# Patient Record
Sex: FEMALE | Race: WHITE | NOT HISPANIC OR LATINO
[De-identification: names, ages, dates, MRNs, and addresses within clinical notes are randomized per-mention and may not be internally consistent; named-entity substitution may affect disease eponyms.]

---

## 2018-06-22 PROBLEM — Z00.00 ENCOUNTER FOR PREVENTIVE HEALTH EXAMINATION: Status: ACTIVE | Noted: 2018-06-22

## 2018-07-26 ENCOUNTER — APPOINTMENT (OUTPATIENT)
Dept: OTOLARYNGOLOGY | Facility: CLINIC | Age: 43
End: 2018-07-26

## 2018-09-01 ENCOUNTER — OUTPATIENT (OUTPATIENT)
Dept: OUTPATIENT SERVICES | Facility: HOSPITAL | Age: 43
LOS: 1 days | End: 2018-09-01
Payer: MEDICAID

## 2018-09-01 PROCEDURE — G9001: CPT

## 2019-04-03 DIAGNOSIS — Z71.89 OTHER SPECIFIED COUNSELING: ICD-10-CM

## 2020-09-03 ENCOUNTER — EMERGENCY (EMERGENCY)
Facility: HOSPITAL | Age: 45
LOS: 1 days | Discharge: HOME | End: 2020-09-03
Attending: EMERGENCY MEDICINE
Payer: MEDICARE

## 2020-09-03 VITALS
WEIGHT: 195.11 LBS | HEART RATE: 90 BPM | RESPIRATION RATE: 20 BRPM | DIASTOLIC BLOOD PRESSURE: 65 MMHG | OXYGEN SATURATION: 96 % | TEMPERATURE: 98 F | SYSTOLIC BLOOD PRESSURE: 119 MMHG | HEIGHT: 67 IN

## 2020-09-03 LAB
ALBUMIN SERPL ELPH-MCNC: 4 G/DL — SIGNIFICANT CHANGE UP (ref 3.3–5)
ALP SERPL-CCNC: 83 U/L — SIGNIFICANT CHANGE UP (ref 40–120)
ALT FLD-CCNC: 8 U/L — LOW (ref 10–45)
ANION GAP SERPL CALC-SCNC: 14 MMOL/L — SIGNIFICANT CHANGE UP (ref 5–17)
AST SERPL-CCNC: 12 U/L — SIGNIFICANT CHANGE UP (ref 10–40)
BASE EXCESS BLDV CALC-SCNC: 0.2 MMOL/L — SIGNIFICANT CHANGE UP (ref -2–2)
BASOPHILS # BLD AUTO: 0.03 K/UL — SIGNIFICANT CHANGE UP (ref 0–0.2)
BASOPHILS NFR BLD AUTO: 0.2 % — SIGNIFICANT CHANGE UP (ref 0–2)
BILIRUB SERPL-MCNC: 0.2 MG/DL — SIGNIFICANT CHANGE UP (ref 0.2–1.2)
BUN SERPL-MCNC: 12 MG/DL — SIGNIFICANT CHANGE UP (ref 7–23)
CA-I SERPL-SCNC: 1.19 MMOL/L — SIGNIFICANT CHANGE UP (ref 1.12–1.3)
CALCIUM SERPL-MCNC: 9 MG/DL — SIGNIFICANT CHANGE UP (ref 8.4–10.5)
CHLORIDE BLDV-SCNC: 105 MMOL/L — SIGNIFICANT CHANGE UP (ref 96–108)
CHLORIDE SERPL-SCNC: 100 MMOL/L — SIGNIFICANT CHANGE UP (ref 96–108)
CO2 BLDV-SCNC: 27 MMOL/L — SIGNIFICANT CHANGE UP (ref 22–30)
CO2 SERPL-SCNC: 22 MMOL/L — SIGNIFICANT CHANGE UP (ref 22–31)
CREAT SERPL-MCNC: 0.66 MG/DL — SIGNIFICANT CHANGE UP (ref 0.5–1.3)
EOSINOPHIL # BLD AUTO: 0.07 K/UL — SIGNIFICANT CHANGE UP (ref 0–0.5)
EOSINOPHIL NFR BLD AUTO: 0.6 % — SIGNIFICANT CHANGE UP (ref 0–6)
GAS PNL BLDV: 137 MMOL/L — SIGNIFICANT CHANGE UP (ref 135–145)
GAS PNL BLDV: SIGNIFICANT CHANGE UP
GLUCOSE BLDV-MCNC: 159 MG/DL — HIGH (ref 70–99)
GLUCOSE SERPL-MCNC: 169 MG/DL — HIGH (ref 70–99)
HCO3 BLDV-SCNC: 26 MMOL/L — SIGNIFICANT CHANGE UP (ref 21–29)
HCT VFR BLD CALC: 37.4 % — SIGNIFICANT CHANGE UP (ref 34.5–45)
HCT VFR BLDA CALC: 37 % — LOW (ref 39–50)
HGB BLD CALC-MCNC: 12 G/DL — SIGNIFICANT CHANGE UP (ref 11.5–15.5)
HGB BLD-MCNC: 11.8 G/DL — SIGNIFICANT CHANGE UP (ref 11.5–15.5)
IMM GRANULOCYTES NFR BLD AUTO: 0.6 % — SIGNIFICANT CHANGE UP (ref 0–1.5)
LACTATE BLDV-MCNC: 1.7 MMOL/L — SIGNIFICANT CHANGE UP (ref 0.7–2)
LIDOCAIN IGE QN: 19 U/L — SIGNIFICANT CHANGE UP (ref 7–60)
LYMPHOCYTES # BLD AUTO: 1.27 K/UL — SIGNIFICANT CHANGE UP (ref 1–3.3)
LYMPHOCYTES # BLD AUTO: 10.4 % — LOW (ref 13–44)
MCHC RBC-ENTMCNC: 28.8 PG — SIGNIFICANT CHANGE UP (ref 27–34)
MCHC RBC-ENTMCNC: 31.6 GM/DL — LOW (ref 32–36)
MCV RBC AUTO: 91.2 FL — SIGNIFICANT CHANGE UP (ref 80–100)
MONOCYTES # BLD AUTO: 0.66 K/UL — SIGNIFICANT CHANGE UP (ref 0–0.9)
MONOCYTES NFR BLD AUTO: 5.4 % — SIGNIFICANT CHANGE UP (ref 2–14)
NEUTROPHILS # BLD AUTO: 10.13 K/UL — HIGH (ref 1.8–7.4)
NEUTROPHILS NFR BLD AUTO: 82.8 % — HIGH (ref 43–77)
NRBC # BLD: 0 /100 WBCS — SIGNIFICANT CHANGE UP (ref 0–0)
PCO2 BLDV: 48 MMHG — SIGNIFICANT CHANGE UP (ref 35–50)
PH BLDV: 7.35 — SIGNIFICANT CHANGE UP (ref 7.35–7.45)
PLATELET # BLD AUTO: 351 K/UL — SIGNIFICANT CHANGE UP (ref 150–400)
PO2 BLDV: 33 MMHG — SIGNIFICANT CHANGE UP (ref 25–45)
POTASSIUM BLDV-SCNC: 3.2 MMOL/L — LOW (ref 3.5–5.3)
POTASSIUM SERPL-MCNC: 3.4 MMOL/L — LOW (ref 3.5–5.3)
POTASSIUM SERPL-SCNC: 3.4 MMOL/L — LOW (ref 3.5–5.3)
PROT SERPL-MCNC: 6.9 G/DL — SIGNIFICANT CHANGE UP (ref 6–8.3)
RBC # BLD: 4.1 M/UL — SIGNIFICANT CHANGE UP (ref 3.8–5.2)
RBC # FLD: 13 % — SIGNIFICANT CHANGE UP (ref 10.3–14.5)
SAO2 % BLDV: 58 % — LOW (ref 67–88)
SODIUM SERPL-SCNC: 136 MMOL/L — SIGNIFICANT CHANGE UP (ref 135–145)
WBC # BLD: 12.23 K/UL — HIGH (ref 3.8–10.5)
WBC # FLD AUTO: 12.23 K/UL — HIGH (ref 3.8–10.5)

## 2020-09-03 PROCEDURE — 93010 ELECTROCARDIOGRAM REPORT: CPT

## 2020-09-03 PROCEDURE — 71045 X-RAY EXAM CHEST 1 VIEW: CPT | Mod: 26

## 2020-09-03 PROCEDURE — 99285 EMERGENCY DEPT VISIT HI MDM: CPT

## 2020-09-03 RX ORDER — MORPHINE SULFATE 50 MG/1
4 CAPSULE, EXTENDED RELEASE ORAL ONCE
Refills: 0 | Status: DISCONTINUED | OUTPATIENT
Start: 2020-09-03 | End: 2020-09-03

## 2020-09-03 RX ADMIN — MORPHINE SULFATE 4 MILLIGRAM(S): 50 CAPSULE, EXTENDED RELEASE ORAL at 23:13

## 2020-09-03 NOTE — ED PROVIDER NOTE - ATTENDING CONTRIBUTION TO CARE
attending Tena: 45yF h/o paranoid Schizophrenia (on Risperdal) p/w diffuse abdominal pain and distension that began 1 hour prior to arrival. No prior abdominal surgeries. No associated symptoms. Abdomen mildly distended on exam, tender diffusely without r/g/r. Will obtain labs, CT A/P, pain control, reassess

## 2020-09-03 NOTE — ED PROVIDER NOTE - OBJECTIVE STATEMENT
45F PMH Paranoid Schizophrenia (on risperdal) presents to the ED with diffuse abdominal pain and distension that began 1 hour prior to arrival. Denies any abd surgeries, denies nausea/v/d, dark/bloody stools. Last normal bowel movement was 2 hours before she had the pain but states she's not passing gas. Denies fevers, chills. States she's had this pain 3 months ago but never got worked up for it. Didn't take anything for the pain. 45F PMH Paranoid Schizophrenia (on risperdal) presents to the ED with diffuse abdominal pain and distension that began 1 hour prior to arrival. Denies any abd surgeries, denies nausea/v/d, dark/bloody stools. Last normal bowel movement was 2 hours before she had the pain but states she's not passing gas. Reports fevers and chills right when she got the pain but they went away. States she's had this pain 3 months ago but never got worked up for it. Didn't take anything for the pain.

## 2020-09-03 NOTE — ED PROVIDER NOTE - CLINICAL SUMMARY MEDICAL DECISION MAKING FREE TEXT BOX
Concern for bowel obstruction despite no surg hx. Will give pain meds, labs, ekg, ct. Reassess. Concern for bowel obstruction despite no surg hx. Pt still in bed unclear if due to abd pain possible perf vs her primary psych, low suspicion for perf though, will order upright chest xray. Will give pain meds, labs, ekg, ct. Reassess.

## 2020-09-03 NOTE — ED ADULT NURSE NOTE - OBJECTIVE STATEMENT
44y/o female presents to the ED via EMS from home c/o Abdominal pain that started around 2130 (10/10 generalized sharp pain), pt denies N/V/D. Pt is AOx4, patent airway, clear lung sounds, abdomen is tender upon palpation diffusely with distention, strong peripheral pulses, no changes in bowel/bladder. Patient denies fever, chills, n/v, weakness, diarrhea/constipation, numbness/tingling, urinary s/s, in no respiratory distress, no chest pain, Patient safety provided with call bell within reach and bed in the lowest position.

## 2020-09-03 NOTE — ED PROVIDER NOTE - PHYSICAL EXAMINATION
GENERAL: no acute distress, non-toxic appearing  HEENT: normal nonicteric conjunctiva, oral mucosa moist  CARDIAC: regular rate and regular rhythm, normal S1 and S2, no appreciable murmurs  PULM: clear to ascultation bilaterally, no appreciable crackles, rales, rhonchi, or wheezing, sats 100% on RA, no increased work of breathing  GI: abdomen distended and diffusely tender, no rebound/guarding  : + suprapubic tenderness  NEURO: alert and oriented x 3, normal speech, moving all extremities without lateralization  MSK: no visible deformities, no peripheral edema, calf tenderness/redness/swelling  SKIN: no visible rashes  PSYCH: anxious/paranoid

## 2020-09-03 NOTE — ED PROVIDER NOTE - PATIENT PORTAL LINK FT
You can access the FollowMyHealth Patient Portal offered by [facility] by registering at the following website: [email or web address]. By joining [employer]’s FollowMyHealth portal, you will also be able to view your health information using other applications (apps) compatible with our system.

## 2020-09-03 NOTE — ED PROVIDER NOTE - PROGRESS NOTE DETAILS
Rickey, PGY2: explained to pt her CT results she's aware, she does report some difficulty urinating for the past 3 days. Will get TVUS. Paged GYN. Rickey, PGY2: explained to pt her CT results she's aware, she does report some difficulty urinating for the past 3 days. Will get TVUS. Paged GYN.  Tried reaching pts brother Jevon to explain results with no answer Rickey, PGY2: pt having difficulty urinating here, she states that she was able to urinate prior to arrival but having a bit more difficulty past few days. when put on bedpan pt denied the difficulty to urinate being from the nurses in the room. unclear if pt primary psych is the cause. straight cath with 400cc urine so likely able to urinate, no bump in cr or hydro seen on CT. Rickey, PGY2: pt able to urinate, discussed the need to follow up with her gyn. sent pain meds to pharmacy

## 2020-09-03 NOTE — ED PROVIDER NOTE - NSFOLLOWUPINSTRUCTIONS_ED_ALL_ED_FT
- Please follow up with your Primary Care Doctor within 48 hours. Bring your results from today.    - Please follow up with your Gynecologist Dr. Ocasio within 48 hours. Bring your results from today. We caryl blood markers for the Ovarian mass that didn't result before you were discharged. You may call [facility] for these results or visit your [facility] Electronic Portal.     - Tylenol up to 650 mg every 6 hours as needed for pain.    - Please take prescribed medication for pain:     - Oxycodone 5mg every 8 hours as needed for pain.    - Be sure to return to the ED if you develop new, worsening, or any distressing symptoms.
Yes

## 2020-09-03 NOTE — ED ADULT TRIAGE NOTE - CHIEF COMPLAINT QUOTE
b/l upper quadrant abdominal pain beginning one hour ago s/p lifting heavy bags. Patient denies n/v/d, f/c at this time.

## 2020-09-04 VITALS
HEART RATE: 111 BPM | RESPIRATION RATE: 19 BRPM | SYSTOLIC BLOOD PRESSURE: 131 MMHG | TEMPERATURE: 98 F | OXYGEN SATURATION: 95 % | DIASTOLIC BLOOD PRESSURE: 87 MMHG

## 2020-09-04 LAB
APPEARANCE UR: CLEAR — SIGNIFICANT CHANGE UP
BACTERIA # UR AUTO: NEGATIVE — SIGNIFICANT CHANGE UP
BILIRUB UR-MCNC: NEGATIVE — SIGNIFICANT CHANGE UP
CANCER AG125 SERPL-ACNC: 211 U/ML — HIGH
CANCER AG19-9 SERPL-ACNC: 434 U/ML — HIGH
CEA SERPL-MCNC: <0.6 NG/ML — SIGNIFICANT CHANGE UP (ref 0–3.8)
COLOR SPEC: YELLOW — SIGNIFICANT CHANGE UP
DIFF PNL FLD: NEGATIVE — SIGNIFICANT CHANGE UP
EPI CELLS # UR: 1 /HPF — SIGNIFICANT CHANGE UP
GLUCOSE UR QL: NEGATIVE — SIGNIFICANT CHANGE UP
HYALINE CASTS # UR AUTO: 0 /LPF — SIGNIFICANT CHANGE UP (ref 0–7)
KETONES UR-MCNC: ABNORMAL
LEUKOCYTE ESTERASE UR-ACNC: NEGATIVE — SIGNIFICANT CHANGE UP
NITRITE UR-MCNC: NEGATIVE — SIGNIFICANT CHANGE UP
PH UR: 6.5 — SIGNIFICANT CHANGE UP (ref 5–8)
PROT UR-MCNC: ABNORMAL
RBC CASTS # UR COMP ASSIST: 2 /HPF — SIGNIFICANT CHANGE UP (ref 0–4)
SP GR SPEC: >1.05 (ref 1.01–1.02)
UROBILINOGEN FLD QL: NEGATIVE — SIGNIFICANT CHANGE UP
WBC UR QL: 1 /HPF — SIGNIFICANT CHANGE UP (ref 0–5)

## 2020-09-04 PROCEDURE — 82330 ASSAY OF CALCIUM: CPT

## 2020-09-04 PROCEDURE — 85018 HEMOGLOBIN: CPT

## 2020-09-04 PROCEDURE — 99283 EMERGENCY DEPT VISIT LOW MDM: CPT

## 2020-09-04 PROCEDURE — 71045 X-RAY EXAM CHEST 1 VIEW: CPT

## 2020-09-04 PROCEDURE — 84295 ASSAY OF SERUM SODIUM: CPT

## 2020-09-04 PROCEDURE — 76856 US EXAM PELVIC COMPLETE: CPT

## 2020-09-04 PROCEDURE — 82435 ASSAY OF BLOOD CHLORIDE: CPT

## 2020-09-04 PROCEDURE — 86304 IMMUNOASSAY TUMOR CA 125: CPT

## 2020-09-04 PROCEDURE — 82947 ASSAY GLUCOSE BLOOD QUANT: CPT

## 2020-09-04 PROCEDURE — 99284 EMERGENCY DEPT VISIT MOD MDM: CPT | Mod: 25

## 2020-09-04 PROCEDURE — 93005 ELECTROCARDIOGRAM TRACING: CPT

## 2020-09-04 PROCEDURE — 84702 CHORIONIC GONADOTROPIN TEST: CPT

## 2020-09-04 PROCEDURE — 86301 IMMUNOASSAY TUMOR CA 19-9: CPT

## 2020-09-04 PROCEDURE — 87086 URINE CULTURE/COLONY COUNT: CPT

## 2020-09-04 PROCEDURE — 85014 HEMATOCRIT: CPT

## 2020-09-04 PROCEDURE — 74177 CT ABD & PELVIS W/CONTRAST: CPT | Mod: 26

## 2020-09-04 PROCEDURE — 76856 US EXAM PELVIC COMPLETE: CPT | Mod: 26

## 2020-09-04 PROCEDURE — 82803 BLOOD GASES ANY COMBINATION: CPT

## 2020-09-04 PROCEDURE — 82378 CARCINOEMBRYONIC ANTIGEN: CPT

## 2020-09-04 PROCEDURE — 74177 CT ABD & PELVIS W/CONTRAST: CPT

## 2020-09-04 PROCEDURE — 80053 COMPREHEN METABOLIC PANEL: CPT

## 2020-09-04 PROCEDURE — 84132 ASSAY OF SERUM POTASSIUM: CPT

## 2020-09-04 PROCEDURE — 81001 URINALYSIS AUTO W/SCOPE: CPT

## 2020-09-04 PROCEDURE — 85027 COMPLETE CBC AUTOMATED: CPT

## 2020-09-04 PROCEDURE — 83605 ASSAY OF LACTIC ACID: CPT

## 2020-09-04 PROCEDURE — 96374 THER/PROPH/DIAG INJ IV PUSH: CPT | Mod: XU

## 2020-09-04 PROCEDURE — 83690 ASSAY OF LIPASE: CPT

## 2020-09-04 RX ORDER — OXYCODONE HYDROCHLORIDE 5 MG/1
5 TABLET ORAL ONCE
Refills: 0 | Status: DISCONTINUED | OUTPATIENT
Start: 2020-09-04 | End: 2020-09-04

## 2020-09-04 RX ORDER — OXYCODONE HYDROCHLORIDE 5 MG/1
1 TABLET ORAL
Qty: 9 | Refills: 0
Start: 2020-09-04 | End: 2020-09-06

## 2020-09-04 RX ORDER — ACETAMINOPHEN 500 MG
1000 TABLET ORAL ONCE
Refills: 0 | Status: COMPLETED | OUTPATIENT
Start: 2020-09-04 | End: 2020-09-04

## 2020-09-04 RX ORDER — ACETAMINOPHEN 500 MG
1000 TABLET ORAL ONCE
Refills: 0 | Status: DISCONTINUED | OUTPATIENT
Start: 2020-09-04 | End: 2020-09-04

## 2020-09-04 RX ORDER — SODIUM CHLORIDE 9 MG/ML
1000 INJECTION INTRAMUSCULAR; INTRAVENOUS; SUBCUTANEOUS ONCE
Refills: 0 | Status: COMPLETED | OUTPATIENT
Start: 2020-09-04 | End: 2020-09-04

## 2020-09-04 RX ADMIN — SODIUM CHLORIDE 1000 MILLILITER(S): 9 INJECTION INTRAMUSCULAR; INTRAVENOUS; SUBCUTANEOUS at 06:42

## 2020-09-04 RX ADMIN — Medication 1000 MILLIGRAM(S): at 06:51

## 2020-09-04 RX ADMIN — OXYCODONE HYDROCHLORIDE 5 MILLIGRAM(S): 5 TABLET ORAL at 06:54

## 2020-09-04 NOTE — CONSULT NOTE ADULT - ASSESSMENT
44yo with no significant OBGYN history presenting to the ED with c/o abdominal pain. Imaging notable for large complex multiseptated pelvic mass and suggested involvement of peritoneum versus omental caking; findings concerning for malignancy.   - No acute GYN intervention warranted at this time  - Rec: UA, UCx for c/o frequent small voids; imaging notable for absent left kidney, no hydronephrosis of R kidney. Cr: 0.66.  - Rec: can send off tumor markers (, CA 19-9, CEA) for use in outpatient workup with private GYN  - Ok with additional imaging in form of Pelvic ultrasound  - Defer management of pain to ED     d/w Dr. Tracie Aguilar R2  [id number]

## 2020-09-04 NOTE — ED ADULT NURSE REASSESSMENT NOTE - NS ED NURSE REASSESS COMMENT FT1
Pt reports being unable to urinate. Straight cath performed with 2 RN at bedside to confirm sterility. 400cc clear yellow urine drained. Urine sample sent.

## 2020-09-04 NOTE — CONSULT NOTE ADULT - ATTENDING COMMENTS
/Attending:    Consulted on this 44y/o P0 who presents to ED with complaints of severe abdominal pain that started appr. 1hr prior to presenting to ED.    Pt was discussed with me; chart reviewed; I agree with the above Resident's assessment and plan.    Pt says pain is dull/cramping and constant; denies any other acute complaints, but does admit to constipation with small caliber stools; +change in urinary habits with frequent, small amounts of urine.  Also admits to SOB on exertion and when laying flat.  LMP 2wks ago and are regular, lasting 5 days.  Pt was given Morphine for pain, with some relief, but pain has returned and is 10/10.    VSS, afebrile; H/H: 11.8/37.4    Pt declined exam.    CT Scan Abd/Pelvis: Large complex multiseptated pelvic mass appears to originate from left adnexa, concerning for ovarian malignancy. Additional right adnexal mass measuring 4.6 x 3.1 cm. Suggestion of fibroid uterus. GYN consultation and further evaluation with pelvic ultrasound as indicated.  Small volume ascites, which measures higher in attenuation than simple fluid. Mesenteric haziness adjacent to the pelvic mass with suggestion of omental caking or peritoneal implants.  Absent left kidney. Right kidney enhances homogeneously without hydronephrosis.  No bowel obstruction.    A/P  -44y/o P0 who presents with abdominal pain with large complex multiseptated pelvic mass on CT scan, most likely gyn malignancy.  -Pain and further management by ED  -No acute gyn intervention at this time; pt needs out-pt gyn follow-up for further investigation and referral to Gyn-Onc.  -Could also get additional imaging for further information about the mass.    Thank you for the consult; please call if any further questions.    Dr. Brownlee /Attending:    Consulted on this 44y/o P0 who presents to ED with complaints of severe abdominal pain that started appr. 1hr prior to presenting to ED.    Pt was discussed with me; chart reviewed; I agree with the above Resident's assessment and plan.    Pt says pain is dull/cramping and constant; denies any other acute complaints, but does admit to constipation with small caliber stools; +change in urinary habits with frequent, small amounts of urine.  Also admits to SOB on exertion and when laying flat.  LMP 2wks ago and are regular, lasting 5 days.  Pt was given Morphine for pain, with some relief, but pain has returned and is 10/10.    VSS, afebrile; H/H: 11.8/37.4    Pt declined exam.    CT Scan Abd/Pelvis: Large complex multiseptated pelvic mass appears to originate from left adnexa, concerning for ovarian malignancy. Additional right adnexal mass measuring 4.6 x 3.1 cm. Suggestion of fibroid uterus. GYN consultation and further evaluation with pelvic ultrasound as indicated.  Small volume ascites, which measures higher in attenuation than simple fluid. Mesenteric haziness adjacent to the pelvic mass with suggestion of omental caking or peritoneal implants.  Absent left kidney. Right kidney enhances homogeneously without hydronephrosis.  No bowel obstruction.    A/P  -44y/o P0 who presents with abdominal pain with large complex multiseptated pelvic mass on CT scan, most likely gyn malignancy.  -Findings were discussed with pt and family member and all questions addressed.  -Pain and further management by ED  -No acute gyn intervention at this time; pt needs out-pt gyn follow-up for further investigation and referral to Gyn-Onc.  -Could also get additional imaging for further information about the mass.    Thank you for the consult; please call if any further questions.    Dr. Brownlee

## 2020-09-04 NOTE — CONSULT NOTE ADULT - SUBJECTIVE AND OBJECTIVE BOX
R2 GYNECOLOGY CONSULT NOTE    46yo G0 LMP 2 weeks ago presenting to the ED with c/o "very bad abdominal pain like I've never had before." Pt reports that pain started 1hr prior to presentation to ED (approximately 7hr prior to this evaluation). Pt did not use analgesia at home. Pt characterizes pain as dull/cramping that is constant; it is worsened with movement. She received Morphine 4mg IVP that "took the edge off," however, she has 10/10 pain again now. + SOB with exertion or when laying flat. + change in bowel habits: constipation that is new and having smaller caliber stools. + change bladder habits: frequent small voids, denies dysuria. Denies CP, nausea/vomiting, fevers/chills, vaginal discharge or bleeding.      She reports previously having a similar episode of sudden abdominal pain in July; the pain started after lifting heavy items and resolved spontaneously. She reports monthly periods with 5 days of bleeding (2 heavy days).     OB/GYN HISTORY:   G0, virginal  Denies fibroids, ov cysts, abnl Pap smears, STIs  OBGYN = Ocasio ([facility])    PAST MEDICAL & SURGICAL HISTORY:  Paranoid Schizophrenia (Psych @ [place]), using Risperdal 1mg/day  Anxiety/Depression  Heart murmur (Cards @ [place])  No surgical history    Medications: Prozac 40mg, Risperdal 1mg  Allergies: NSAIDs (facial swelling)    FHx: skin cancer (mother, father); lung cancer (2x maternal aunt)    SOCIAL HISTORY: denies T/E/D    REVIEW OF SYSTEMS:  CONSTITUTIONAL: No weakness, fevers or chills  RESPIRATORY: No cough, wheezing, hemoptysis; No shortness of breath  CARDIOVASCULAR: No chest pain or palpitations  GASTROINTESTINAL: +abdominal pain. No nausea, vomiting, or hematemesis; No diarrhea or constipation. GENITOURINARY: No dysuria or hematuria  All other review of systems is negative unless indicated above.    Vital Signs Last 24 Hrs  T(C): 36.9 (04 Sep 2020 01:58), Max: 36.9 (04 Sep 2020 01:58)  T(F): 98.5 (04 Sep 2020 01:58), Max: 98.5 (04 Sep 2020 01:58)  HR: 85 (04 Sep 2020 01:58) (85 - 90)  BP: 128/72 (04 Sep 2020 01:58) (119/65 - 128/72)  RR: 18 (04 Sep 2020 01:58) (18 - 20)  SpO2: 98% (04 Sep 2020 01:58) (96% - 98%)    PHYSICAL EXAM:  Gen: flat affect, laying in bed, A&Ox3  Pulm: nonlabored breathing  Abd: pt declined examination    LABS:                        11.8   12.23 )-----------( 351      ( 03 Sep 2020 23:03 )             37.4     09-03    136  |  100  |  12  ----------------------------<  169<H>  3.4<L>   |  22  |  0.66    Ca    9.0      03 Sep 2020 23:03    TPro  6.9  /  Alb  4.0  /  TBili  0.2  /  DBili  x   /  AST  12  /  ALT  8<L>  /  AlkPhos  83  09-03    RADIOLOGY & ADDITIONAL STUDIES:  < from: CT Abdomen and Pelvis w/ IV Cont (09.04.20 @ 01:00) >    EXAM:  CT ABDOMEN AND PELVIS IC                          PROCEDURE DATE:  09/04/2020    INTERPRETATION:  CLINICAL INFORMATION: Abdominal distention    COMPARISON: None.    PROCEDURE:  CT of the Abdomen and Pelvis was performed with intravenous contrast.  Intravenous contrast: 90 ml Omnipaque 350. 10 ml discarded.  Oral contrast: None.  Sagittal and coronal reformats were performed.    FINDINGS:  LOWER CHEST: Mild bibasilar subsegmental atelectasis. No consolidation or pleural effusion.    LIVER: Within normal limits.  BILE DUCTS: Normal caliber.  GALLBLADDER: Within normal limits.  SPLEEN: Within normal limits.  PANCREAS: Within normal limits.  ADRENALS: Within normal limits.  KIDNEYS/URETERS: Absent left kidney. Right kidney enhances homogeneously without hydronephrosis.    BLADDER: Inadequately distended.  REPRODUCTIVE ORGANS: Large complex multiseptated pelvic mass appears to originate from left adnexa, largest component of the mass measuring 12.3 x 11 x 9.2 cm (TR x AP x CC). Additional right adnexal mass measuring 4.6 x 3.1 cm. Suggestion of fibroid uterus.    BOWEL: No bowel obstruction. Appendix is not visualized. No evidence of inflammation in the pericecal region.  PERITONEUM: Small volume ascites, which measures higher in attenuation than simple fluid. Mesenteric haziness adjacent to the pelvic mass with suggestion of omental caking or peritoneal implants.  VESSELS: Within normal limits.  RETROPERITONEUM/LYMPH NODES: 1.6 cm periportal lymph node) 2:43).  ABDOMINAL WALL: Tiny fat-containing umbilical hernia.  BONES: Spinal degenerative changes.    IMPRESSION:    Large complex multiseptated pelvic mass appears to originate from left adnexa, concerning for ovarian malignancy. Additional right adnexal mass measuring 4.6 x 3.1 cm. Suggestion of fibroid uterus. GYN consultation and further evaluation with pelvic ultrasound as indicated.    Small volume ascites, which measures higher in attenuation than simple fluid. Mesenteric haziness adjacent to the pelvic mass with suggestion of omental caking or peritoneal implants.    No bowel obstruction.    These findings were discussed with Dr. Sousa at 9/4/2020 3:31 AM by Dr. Juarez.      DAYANARA JUAREZ M.D., RADIOLOGY RESIDENT  This document has been electronically signed.  SHANNON CRAIG M.D., ATTENDING RADIOLOGIST  This document has been electronically signed. Sep  4 2020  3:39AM          < end of copied text >

## 2020-09-06 LAB
CULTURE RESULTS: NO GROWTH — SIGNIFICANT CHANGE UP
SPECIMEN SOURCE: SIGNIFICANT CHANGE UP

## 2020-09-06 NOTE — ED POST DISCHARGE NOTE - DETAILS
9/6/20: discussed results with pt, pt has follow up with pcp this week and will obtain referral for gyn/onc, discussed that her pcp/gyn can call us to fax results if they need test results, pt notes understanding. -Denia Keating PA-C

## 2020-09-08 ENCOUNTER — INPATIENT (INPATIENT)
Facility: HOSPITAL | Age: 45
LOS: 0 days | Discharge: HOME | DRG: 754 | End: 2020-09-09
Attending: HOSPITALIST | Admitting: HOSPITALIST
Payer: MEDICARE

## 2020-09-08 VITALS
HEIGHT: 67 IN | WEIGHT: 195.11 LBS | TEMPERATURE: 98 F | SYSTOLIC BLOOD PRESSURE: 136 MMHG | HEART RATE: 138 BPM | DIASTOLIC BLOOD PRESSURE: 86 MMHG | OXYGEN SATURATION: 95 % | RESPIRATION RATE: 20 BRPM

## 2020-09-08 DIAGNOSIS — F20.0 PARANOID SCHIZOPHRENIA: ICD-10-CM

## 2020-09-08 DIAGNOSIS — R10.9 UNSPECIFIED ABDOMINAL PAIN: ICD-10-CM

## 2020-09-08 DIAGNOSIS — F41.9 ANXIETY DISORDER, UNSPECIFIED: ICD-10-CM

## 2020-09-08 DIAGNOSIS — Z29.9 ENCOUNTER FOR PROPHYLACTIC MEASURES, UNSPECIFIED: ICD-10-CM

## 2020-09-08 DIAGNOSIS — R19.00 INTRA-ABDOMINAL AND PELVIC SWELLING, MASS AND LUMP, UNSPECIFIED SITE: ICD-10-CM

## 2020-09-08 DIAGNOSIS — R65.10 SYSTEMIC INFLAMMATORY RESPONSE SYNDROME (SIRS) OF NON-INFECTIOUS ORIGIN WITHOUT ACUTE ORGAN DYSFUNCTION: ICD-10-CM

## 2020-09-08 DIAGNOSIS — R00.0 TACHYCARDIA, UNSPECIFIED: ICD-10-CM

## 2020-09-08 LAB
ALBUMIN SERPL ELPH-MCNC: 3.2 G/DL — LOW (ref 3.3–5)
ALP SERPL-CCNC: 98 U/L — SIGNIFICANT CHANGE UP (ref 40–120)
ALT FLD-CCNC: 11 U/L — SIGNIFICANT CHANGE UP (ref 10–45)
ANION GAP SERPL CALC-SCNC: 17 MMOL/L — SIGNIFICANT CHANGE UP (ref 5–17)
ANION GAP SERPL CALC-SCNC: 17 MMOL/L — SIGNIFICANT CHANGE UP (ref 5–17)
APPEARANCE UR: CLEAR — SIGNIFICANT CHANGE UP
APTT BLD: 27.8 SEC — SIGNIFICANT CHANGE UP (ref 27.5–35.5)
AST SERPL-CCNC: 15 U/L — SIGNIFICANT CHANGE UP (ref 10–40)
BACTERIA # UR AUTO: ABNORMAL
BASE EXCESS BLDV CALC-SCNC: -1.4 MMOL/L — SIGNIFICANT CHANGE UP (ref -2–2)
BASOPHILS # BLD AUTO: 0.03 K/UL — SIGNIFICANT CHANGE UP (ref 0–0.2)
BASOPHILS NFR BLD AUTO: 0.2 % — SIGNIFICANT CHANGE UP (ref 0–2)
BILIRUB SERPL-MCNC: 0.5 MG/DL — SIGNIFICANT CHANGE UP (ref 0.2–1.2)
BILIRUB UR-MCNC: NEGATIVE — SIGNIFICANT CHANGE UP
BUN SERPL-MCNC: 16 MG/DL — SIGNIFICANT CHANGE UP (ref 7–23)
BUN SERPL-MCNC: 20 MG/DL — SIGNIFICANT CHANGE UP (ref 7–23)
CA-I SERPL-SCNC: 1.15 MMOL/L — SIGNIFICANT CHANGE UP (ref 1.12–1.3)
CALCIUM SERPL-MCNC: 8.8 MG/DL — SIGNIFICANT CHANGE UP (ref 8.4–10.5)
CALCIUM SERPL-MCNC: 9.6 MG/DL — SIGNIFICANT CHANGE UP (ref 8.4–10.5)
CHLORIDE BLDV-SCNC: 105 MMOL/L — SIGNIFICANT CHANGE UP (ref 96–108)
CHLORIDE SERPL-SCNC: 101 MMOL/L — SIGNIFICANT CHANGE UP (ref 96–108)
CHLORIDE SERPL-SCNC: 98 MMOL/L — SIGNIFICANT CHANGE UP (ref 96–108)
CO2 BLDV-SCNC: 25 MMOL/L — SIGNIFICANT CHANGE UP (ref 22–30)
CO2 SERPL-SCNC: 19 MMOL/L — LOW (ref 22–31)
CO2 SERPL-SCNC: 21 MMOL/L — LOW (ref 22–31)
COLOR SPEC: YELLOW — SIGNIFICANT CHANGE UP
CREAT SERPL-MCNC: 0.58 MG/DL — SIGNIFICANT CHANGE UP (ref 0.5–1.3)
CREAT SERPL-MCNC: 0.67 MG/DL — SIGNIFICANT CHANGE UP (ref 0.5–1.3)
D DIMER BLD IA.RAPID-MCNC: 2793 NG/ML DDU — HIGH
DIFF PNL FLD: ABNORMAL
EOSINOPHIL # BLD AUTO: 0.08 K/UL — SIGNIFICANT CHANGE UP (ref 0–0.5)
EOSINOPHIL NFR BLD AUTO: 0.6 % — SIGNIFICANT CHANGE UP (ref 0–6)
EPI CELLS # UR: 3 /HPF — SIGNIFICANT CHANGE UP
GAS PNL BLDV: 134 MMOL/L — LOW (ref 135–145)
GAS PNL BLDV: SIGNIFICANT CHANGE UP
GAS PNL BLDV: SIGNIFICANT CHANGE UP
GLUCOSE BLDV-MCNC: 123 MG/DL — HIGH (ref 70–99)
GLUCOSE SERPL-MCNC: 118 MG/DL — HIGH (ref 70–99)
GLUCOSE SERPL-MCNC: 135 MG/DL — HIGH (ref 70–99)
GLUCOSE UR QL: NEGATIVE — SIGNIFICANT CHANGE UP
HCO3 BLDV-SCNC: 23 MMOL/L — SIGNIFICANT CHANGE UP (ref 21–29)
HCT VFR BLD CALC: 38.4 % — SIGNIFICANT CHANGE UP (ref 34.5–45)
HCT VFR BLD CALC: 40.1 % — SIGNIFICANT CHANGE UP (ref 34.5–45)
HCT VFR BLDA CALC: 35 % — LOW (ref 39–50)
HGB BLD CALC-MCNC: 11.5 G/DL — SIGNIFICANT CHANGE UP (ref 11.5–15.5)
HGB BLD-MCNC: 12 G/DL — SIGNIFICANT CHANGE UP (ref 11.5–15.5)
HGB BLD-MCNC: 12.7 G/DL — SIGNIFICANT CHANGE UP (ref 11.5–15.5)
HOROWITZ INDEX BLDV+IHG-RTO: SIGNIFICANT CHANGE UP
HYALINE CASTS # UR AUTO: 4 /LPF — HIGH (ref 0–2)
IMM GRANULOCYTES NFR BLD AUTO: 1.5 % — SIGNIFICANT CHANGE UP (ref 0–1.5)
INR BLD: 1.07 RATIO — SIGNIFICANT CHANGE UP (ref 0.88–1.16)
KETONES UR-MCNC: ABNORMAL
LACTATE BLDV-MCNC: 1.2 MMOL/L — SIGNIFICANT CHANGE UP (ref 0.7–2)
LEUKOCYTE ESTERASE UR-ACNC: NEGATIVE — SIGNIFICANT CHANGE UP
LYMPHOCYTES # BLD AUTO: 0.64 K/UL — LOW (ref 1–3.3)
LYMPHOCYTES # BLD AUTO: 4.7 % — LOW (ref 13–44)
MCHC RBC-ENTMCNC: 28.4 PG — SIGNIFICANT CHANGE UP (ref 27–34)
MCHC RBC-ENTMCNC: 28.7 PG — SIGNIFICANT CHANGE UP (ref 27–34)
MCHC RBC-ENTMCNC: 31.3 GM/DL — LOW (ref 32–36)
MCHC RBC-ENTMCNC: 31.7 GM/DL — LOW (ref 32–36)
MCV RBC AUTO: 90.5 FL — SIGNIFICANT CHANGE UP (ref 80–100)
MCV RBC AUTO: 90.8 FL — SIGNIFICANT CHANGE UP (ref 80–100)
MONOCYTES # BLD AUTO: 0.68 K/UL — SIGNIFICANT CHANGE UP (ref 0–0.9)
MONOCYTES NFR BLD AUTO: 5 % — SIGNIFICANT CHANGE UP (ref 2–14)
NEUTROPHILS # BLD AUTO: 12.05 K/UL — HIGH (ref 1.8–7.4)
NEUTROPHILS NFR BLD AUTO: 88 % — HIGH (ref 43–77)
NITRITE UR-MCNC: NEGATIVE — SIGNIFICANT CHANGE UP
NRBC # BLD: 0 /100 WBCS — SIGNIFICANT CHANGE UP (ref 0–0)
NRBC # BLD: 0 /100 WBCS — SIGNIFICANT CHANGE UP (ref 0–0)
PCO2 BLDV: 42 MMHG — SIGNIFICANT CHANGE UP (ref 35–50)
PH BLDV: 7.36 — SIGNIFICANT CHANGE UP (ref 7.35–7.45)
PH UR: 7 — SIGNIFICANT CHANGE UP (ref 5–8)
PLATELET # BLD AUTO: 413 K/UL — HIGH (ref 150–400)
PLATELET # BLD AUTO: 459 K/UL — HIGH (ref 150–400)
PO2 BLDV: 50 MMHG — HIGH (ref 25–45)
POTASSIUM BLDV-SCNC: 3.7 MMOL/L — SIGNIFICANT CHANGE UP (ref 3.5–5.3)
POTASSIUM SERPL-MCNC: 3.8 MMOL/L — SIGNIFICANT CHANGE UP (ref 3.5–5.3)
POTASSIUM SERPL-MCNC: 4.2 MMOL/L — SIGNIFICANT CHANGE UP (ref 3.5–5.3)
POTASSIUM SERPL-SCNC: 3.8 MMOL/L — SIGNIFICANT CHANGE UP (ref 3.5–5.3)
POTASSIUM SERPL-SCNC: 4.2 MMOL/L — SIGNIFICANT CHANGE UP (ref 3.5–5.3)
PROT SERPL-MCNC: 7.1 G/DL — SIGNIFICANT CHANGE UP (ref 6–8.3)
PROT UR-MCNC: 100 — SIGNIFICANT CHANGE UP
PROTHROM AB SERPL-ACNC: 12.7 SEC — SIGNIFICANT CHANGE UP (ref 10.6–13.6)
RBC # BLD: 4.23 M/UL — SIGNIFICANT CHANGE UP (ref 3.8–5.2)
RBC # BLD: 4.43 M/UL — SIGNIFICANT CHANGE UP (ref 3.8–5.2)
RBC # FLD: 13.6 % — SIGNIFICANT CHANGE UP (ref 10.3–14.5)
RBC # FLD: 14 % — SIGNIFICANT CHANGE UP (ref 10.3–14.5)
RBC CASTS # UR COMP ASSIST: 93 /HPF — HIGH (ref 0–4)
SAO2 % BLDV: 82 % — SIGNIFICANT CHANGE UP (ref 67–88)
SARS-COV-2 RNA SPEC QL NAA+PROBE: SIGNIFICANT CHANGE UP
SODIUM SERPL-SCNC: 136 MMOL/L — SIGNIFICANT CHANGE UP (ref 135–145)
SODIUM SERPL-SCNC: 137 MMOL/L — SIGNIFICANT CHANGE UP (ref 135–145)
SP GR SPEC: >1.05 (ref 1.01–1.02)
TROPONIN T, HIGH SENSITIVITY RESULT: 9 NG/L — SIGNIFICANT CHANGE UP (ref 0–51)
UROBILINOGEN FLD QL: NEGATIVE — SIGNIFICANT CHANGE UP
WBC # BLD: 12.75 K/UL — HIGH (ref 3.8–10.5)
WBC # BLD: 13.69 K/UL — HIGH (ref 3.8–10.5)
WBC # FLD AUTO: 12.75 K/UL — HIGH (ref 3.8–10.5)
WBC # FLD AUTO: 13.69 K/UL — HIGH (ref 3.8–10.5)
WBC UR QL: 5 /HPF — SIGNIFICANT CHANGE UP (ref 0–5)

## 2020-09-08 PROCEDURE — 71275 CT ANGIOGRAPHY CHEST: CPT | Mod: 26

## 2020-09-08 PROCEDURE — 74018 RADEX ABDOMEN 1 VIEW: CPT | Mod: 26

## 2020-09-08 PROCEDURE — 93010 ELECTROCARDIOGRAM REPORT: CPT

## 2020-09-08 PROCEDURE — 99223 1ST HOSP IP/OBS HIGH 75: CPT

## 2020-09-08 PROCEDURE — 99285 EMERGENCY DEPT VISIT HI MDM: CPT

## 2020-09-08 PROCEDURE — 76856 US EXAM PELVIC COMPLETE: CPT | Mod: 26

## 2020-09-08 PROCEDURE — 93970 EXTREMITY STUDY: CPT | Mod: 26

## 2020-09-08 RX ORDER — MORPHINE SULFATE 50 MG/1
4 CAPSULE, EXTENDED RELEASE ORAL EVERY 4 HOURS
Refills: 0 | Status: DISCONTINUED | OUTPATIENT
Start: 2020-09-08 | End: 2020-09-09

## 2020-09-08 RX ORDER — ONDANSETRON 8 MG/1
4 TABLET, FILM COATED ORAL ONCE
Refills: 0 | Status: COMPLETED | OUTPATIENT
Start: 2020-09-08 | End: 2020-09-08

## 2020-09-08 RX ORDER — SODIUM CHLORIDE 9 MG/ML
1000 INJECTION INTRAMUSCULAR; INTRAVENOUS; SUBCUTANEOUS ONCE
Refills: 0 | Status: COMPLETED | OUTPATIENT
Start: 2020-09-08 | End: 2020-09-08

## 2020-09-08 RX ORDER — INFLUENZA VIRUS VACCINE 15; 15; 15; 15 UG/.5ML; UG/.5ML; UG/.5ML; UG/.5ML
0.5 SUSPENSION INTRAMUSCULAR ONCE
Refills: 0 | Status: DISCONTINUED | OUTPATIENT
Start: 2020-09-08 | End: 2020-09-09

## 2020-09-08 RX ORDER — OXYCODONE HYDROCHLORIDE 5 MG/1
5 TABLET ORAL EVERY 6 HOURS
Refills: 0 | Status: DISCONTINUED | OUTPATIENT
Start: 2020-09-08 | End: 2020-09-08

## 2020-09-08 RX ORDER — RISPERIDONE 4 MG/1
1 TABLET ORAL DAILY
Refills: 0 | Status: DISCONTINUED | OUTPATIENT
Start: 2020-09-08 | End: 2020-09-09

## 2020-09-08 RX ORDER — ENOXAPARIN SODIUM 100 MG/ML
40 INJECTION SUBCUTANEOUS DAILY
Refills: 0 | Status: DISCONTINUED | OUTPATIENT
Start: 2020-09-08 | End: 2020-09-09

## 2020-09-08 RX ORDER — MORPHINE SULFATE 50 MG/1
2 CAPSULE, EXTENDED RELEASE ORAL EVERY 4 HOURS
Refills: 0 | Status: DISCONTINUED | OUTPATIENT
Start: 2020-09-08 | End: 2020-09-08

## 2020-09-08 RX ORDER — ONDANSETRON 8 MG/1
4 TABLET, FILM COATED ORAL EVERY 6 HOURS
Refills: 0 | Status: DISCONTINUED | OUTPATIENT
Start: 2020-09-08 | End: 2020-09-09

## 2020-09-08 RX ORDER — ACETAMINOPHEN 500 MG
650 TABLET ORAL EVERY 4 HOURS
Refills: 0 | Status: DISCONTINUED | OUTPATIENT
Start: 2020-09-08 | End: 2020-09-09

## 2020-09-08 RX ORDER — FLUOXETINE HCL 10 MG
40 CAPSULE ORAL DAILY
Refills: 0 | Status: DISCONTINUED | OUTPATIENT
Start: 2020-09-08 | End: 2020-09-09

## 2020-09-08 RX ORDER — MORPHINE SULFATE 50 MG/1
4 CAPSULE, EXTENDED RELEASE ORAL ONCE
Refills: 0 | Status: DISCONTINUED | OUTPATIENT
Start: 2020-09-08 | End: 2020-09-08

## 2020-09-08 RX ADMIN — MORPHINE SULFATE 4 MILLIGRAM(S): 50 CAPSULE, EXTENDED RELEASE ORAL at 04:16

## 2020-09-08 RX ADMIN — MORPHINE SULFATE 4 MILLIGRAM(S): 50 CAPSULE, EXTENDED RELEASE ORAL at 03:46

## 2020-09-08 RX ADMIN — SODIUM CHLORIDE 1000 MILLILITER(S): 9 INJECTION INTRAMUSCULAR; INTRAVENOUS; SUBCUTANEOUS at 03:46

## 2020-09-08 RX ADMIN — ONDANSETRON 4 MILLIGRAM(S): 8 TABLET, FILM COATED ORAL at 11:33

## 2020-09-08 RX ADMIN — ONDANSETRON 4 MILLIGRAM(S): 8 TABLET, FILM COATED ORAL at 18:07

## 2020-09-08 RX ADMIN — SODIUM CHLORIDE 1000 MILLILITER(S): 9 INJECTION INTRAMUSCULAR; INTRAVENOUS; SUBCUTANEOUS at 01:26

## 2020-09-08 RX ADMIN — ONDANSETRON 4 MILLIGRAM(S): 8 TABLET, FILM COATED ORAL at 16:23

## 2020-09-08 RX ADMIN — ONDANSETRON 4 MILLIGRAM(S): 8 TABLET, FILM COATED ORAL at 23:03

## 2020-09-08 RX ADMIN — ENOXAPARIN SODIUM 40 MILLIGRAM(S): 100 INJECTION SUBCUTANEOUS at 21:35

## 2020-09-08 NOTE — H&P ADULT - NSHPLABSRESULTS_GEN_ALL_CORE
Labs personally reviewed:                          12.7   13.69 )-----------( 459      ( 08 Sep 2020 01:24 )             40.1     09-08    136  |  98  |  20  ----------------------------<  135<H>  3.8   |  21<L>  |  0.67    Ca    9.6      08 Sep 2020 01:24    TPro  7.1  /  Alb  3.2<L>  /  TBili  0.5  /  DBili  x   /  AST  15  /  ALT  11  /  AlkPhos  98  09-08        LIVER FUNCTIONS - ( 08 Sep 2020 01:24 )  Alb: 3.2 g/dL / Pro: 7.1 g/dL / ALK PHOS: 98 U/L / ALT: 11 U/L / AST: 15 U/L / GGT: x           PT/INR - ( 08 Sep 2020 01:24 )   PT: 12.7 sec;   INR: 1.07 ratio         PTT - ( 08 Sep 2020 01:24 )  PTT:27.8 sec  Urinalysis Basic - ( 08 Sep 2020 04:33 )    Color: Yellow / Appearance: Clear / SG: >1.050 / pH: x  Gluc: x / Ketone: Large  / Bili: Negative / Urobili: Negative   Blood: x / Protein: 100 / Nitrite: Negative   Leuk Esterase: Negative / RBC: 93 /hpf / WBC 5 /HPF   Sq Epi: x / Non Sq Epi: 3 /hpf / Bacteria: Few      CAPILLARY BLOOD GLUCOSE          Imaging:  CTA chest:   1.  No pulmonary embolus.  2.  Pulmonary artery hypertension and enlarged right heart. Correlate for cor pulmonale.    CXR personally reviewed: no focal opacity    EKG personally reviewed: sinus tachycardia in 120’s , TWI inferiorly

## 2020-09-08 NOTE — ED ADULT NURSE NOTE - OBJECTIVE STATEMENT
46 yo female with a PMH of psychiatric disorder presents to the ED via waiting room from home complaining of abdominal pain that began tonight. Patient was recently seen here at [facility] ER for same chief complaint last week. Patient began her menstrual cycle yesterday, is on day 2 today--normal flow as per patient. States she was d/c'd with oxycodone, took last dose today at about 2200--shortly after threw up. Endorses eating and drinking, is able to tolerate PO. Patient states that abdominal pain is "a lot better" than when she came in last week. Patient remains tachycardic here but is denying SOB. Abdomen is soft. States she feels its more distended than usual, endorsing generalized abdominal pain upon palpation. Denies headache, dizziness, vision changes, chest pain, shortness of breath, diarrhea, fevers, chills, dysuria, hematuria, recent illness travel or fall.

## 2020-09-08 NOTE — ED PROVIDER NOTE - CLINICAL SUMMARY MEDICAL DECISION MAKING FREE TEXT BOX
45M presenting with abd pain PE: tachycardic to the 130s, clear lungs, non focal abd Ddx: Concern for PE given probably underlying malignancy. Plan: labs, consider CT pe is heart rate not responsive to fluids.

## 2020-09-08 NOTE — H&P ADULT - HISTORY OF PRESENT ILLNESS
Patient is a 45 year old female w/pmh Paranoid Schizophrenia, anxiety/ depression , recently diagnosed pelvic mass,  presents for persistent abdominal pain. Patient reports diffuse abdominal pain , associated with nausea and one episode of vomiting . She reports some response to tylenol and oxycodone. She reports no chest pain , some shortness of breath mostly when laying flat,  she reports minimal lower extremity edema , no calf pain.   She has no fever or chills , no cough , no diarrhea , no dysuria. She reports decreased appetite and decreased po intake.   Patient was seen in the emergency room three days prior to admission for similar pain , imaging at that time revealed a  Large complex multiseptated pelvic mass  , she was evaluated by GYN and planned for further work up as outpatient

## 2020-09-08 NOTE — ED PROVIDER NOTE - OBJECTIVE STATEMENT
45F pmhx of recently diagnosed pelvic mass, to be worked up fully presenting with CC of continued abd pain, +nausea. Pt states that the abd pain feels the same as when she presented to the ER last time. But ran out of pain medications. Had 1 episode of vomiting today, right after she took an oxycodone pill. Pain resolved on the way to the ER. Has an appointment with PCP tomorrow, also called Dr. Ocasio (gynecology) to set up follow up, pending apt. No f/c/nausea at the moment. +SOB when wearing mask, but no CP or leg swelling.

## 2020-09-08 NOTE — ED ADULT NURSE REASSESSMENT NOTE - NS ED NURSE REASSESS COMMENT FT1
pt titrated down to 2L NC. satting well in high 90's. dr. jesús foster aware. pt reports SOB is improved.

## 2020-09-08 NOTE — CONSULT NOTE ADULT - ASSESSMENT
A/P 44 y/o G0, LMP 96/20, with large complex multiseptated pelvic mass and peritoneal carcinomatosis admitted with abdominal pain. Patient tachycardic to the 110s, vitals otherwise stable. Exam with moderate abdominal distention and non-focal tenderness.  and CA 19-9 elevated. CT chest without evidence of metastatic disease. Patient likely with ovarian malignancy.    Patient to be seen by GYN ONC attending    MARITA Arias PGY3 A/P 44 y/o G0, LMP 96/20, with large complex multiseptated pelvic mass and peritoneal carcinomatosis suspicious for ovarian malignancy admitted with abdominal pain. Patient tachycardic to the 110s, vitals otherwise stable. Exam with moderate abdominal distention and non-focal tenderness.  and CA 19-9 elevated. CT chest without evidence of metastatic disease.     - Recommend KUB to eval for obstruction  - Analgesia per primary team  - Medical optimization for likely outpatient surgical planning  - Additional recs pending    MARITA Arias PGY3  d/w Dr. Peters, GYN ONC Fellow  TBD w/ Dr. Mayfield A/P 46 y/o G0, LMP 96/20, with large complex multiseptated pelvic mass and peritoneal carcinomatosis suspicious for ovarian malignancy admitted with abdominal pain. Patient tachycardic to the 110s, vitals otherwise stable. Exam with moderate abdominal distention and non-focal tenderness.  and CA 19-9 elevated. CT chest without evidence of metastatic disease.     - Recommend Abd Xr to eval for obstruction  - Analgesia per primary team  - Medical optimization for likely outpatient surgical planning  - Additional recs pending    MARITA Arias PGY3  d/w Dr. Peters, Dr. Mayfield  Patient TBS w/ Dr. Horowitz    Fellow Addendum: Pt discussed with resident. Agree with H&P. 44 yo with exacerbation of acute onset abdominal pain in the setting of known 12 cm pelvic mass. Concern for malignancy due to complex nature of mass and elevated tumor markers. She will require tissue diagnosis prior to treatment planning. Due to the symptomatic nature of the mass, she will require surgical intervention in the future. Abdominal X-ray ordered to assess for possible obstruction. Continue care per primary team.    PAVAN Arevalo MD  D/w Dr. Mayfield

## 2020-09-08 NOTE — H&P ADULT - NSICDXPASTMEDICALHX_GEN_ALL_CORE_FT
PAST MEDICAL HISTORY:  Anxiety     H/O pulmonary valve stenosis     Heart murmur     Paranoid schizophrenia

## 2020-09-08 NOTE — ED PROVIDER NOTE - NS ED ROS FT
CONST: no fevers, no chills, no trauma  EYES: no pain, no blurry vision   ENT: no sore throat, no epistaxis, no rhinorrhea  CV: no chest pain, no palpitations, no orthopnea, no extremity pain or swelling  RESP: + shortness of breath, no cough, no sputum, no pleurisy, no wheezing  ABD: + abdominal pain, + nausea, no vomiting, no diarrhea, no black or bloody stool  : no dysuria, no hematuria, no frequency, no urgency  MSK: no back pain, no neck pain, no extremity pain  NEURO: no headache, no sensory disturbances, no focal weakness, no dizziness  HEME: no easy bleeding or bruising  SKIN: no diaphoresis, no rash

## 2020-09-08 NOTE — ED ADULT NURSE REASSESSMENT NOTE - NS ED NURSE REASSESS COMMENT FT1
pt sitting upright in bed, mentating well A&Ox4, but O2 sat on RA is 91%. placed on 2L NC w/ no improvement in saturation. increased to 4L with some improvement, then increased to 6L w/ improvement to 94%. pt going to CT to r/o PE. dr. jesús foster aware. pt is tachypneic but not increased from prior to desat. breathing unlabored but shallow. pt able to speak clear and complete sentences without difficulty.

## 2020-09-08 NOTE — CONSULT NOTE ADULT - SUBJECTIVE AND OBJECTIVE BOX
Gyn Onc Consult Note    REBECCA CALL  45y  Female [id number]    HPI: 44 y/o G0, LMP 96/20, admitted with abdominal pain. Patient s/p ED visit 9/4 with same complaint. Found to have large complex multiseptated pelvic mass with possible peritoneal carcinomatosis concerning for ovarian malignancy. GYN consulted at that time, recommended tumor markers and outpatient follow up with GYN ONC. Patient admitted today for persist abdominal pain. Patient reports that pain since ED visit has persisted and not responded to Tylenol and Oxy. Patient not taking NSAIDs as she is allergic. Patient also endorses nausea with infrequent vomiting. Unable to tolerating full PO secondary to pain. Also reports not passing gas or having a bowel movement since three days ago. Normally patient has a bowel movement daily. Reports small caliber stools. Denies bloody stool, tarry stools. Denies fevers, chills, urinary symptoms. Endorses some shortness of breath when laying flat. Denies mood symptoms currently.     Name of GYN Physician: Dr. Ocasio [facility]    OBHx: G0, virginal  GYNHx: Denies fibroids, cysts, endometriosis, STI's, Abnormal pap smears.  PMH: pulmonary valve stenosis, heart murmur, anxiety, depression Paranoid schizophrenia  PSH: denies  Meds: Prozac, Risperdal, reports compliance with medications at home  All: NSAIDs (angioedema)  Famhx: denies family history of breast, uterine, ovarian, colon cancers  Soc: lives at home with mother who is hard of hearing. gives permission for us to contact her brother Jevon Call    Vital Signs Last 24 Hrs  T(C): 37.5 (08 Sep 2020 15:45), Max: 37.5 (08 Sep 2020 15:45)  T(F): 99.5 (08 Sep 2020 15:45), Max: 99.5 (08 Sep 2020 15:45)  HR: 113 (08 Sep 2020 15:45) (110 - 138)  BP: 134/90 (08 Sep 2020 15:45) (120/95 - 139/92)  BP(mean): --  RR: 18 (08 Sep 2020 15:45) (16 - 38)  SpO2: 95% (08 Sep 2020 15:45) (91% - 99%)    Physical Exam:   General: sitting comfortably in bed, NAD   CV: RRR  Lungs: CTAB  Back: No CVA tenderness  Abd: Soft, non-tender, non-distended.  Bowel sounds present.    :  No bleeding on pad.    External labia wnl.  Bimanual exam with no cervical motion tenderness, uterus wnl, adnexa non palpable b/l.  Cervix closed vs. Cervix dilated    cm   Speculum Exam: No active bleeding from os.  Physiologic discharge.    Ext: non-tender b/l, no edema     LABS:                              12.0   12.75 )-----------( 413      ( 08 Sep 2020 06:20 )             38.4     09-08    137  |  101  |  16  ----------------------------<  118<H>  4.2   |  19<L>  |  0.58    Ca    8.8      08 Sep 2020 06:20    TPro  7.1  /  Alb  3.2<L>  /  TBili  0.5  /  DBili  x   /  AST  15  /  ALT  11  /  AlkPhos  98  09-08    I&O's Detail    PT/INR - ( 08 Sep 2020 01:24 )   PT: 12.7 sec;   INR: 1.07 ratio         PTT - ( 08 Sep 2020 01:24 )  PTT:27.8 sec  Urinalysis Basic - ( 08 Sep 2020 04:33 )    Color: Yellow / Appearance: Clear / SG: >1.050 / pH: x  Gluc: x / Ketone: Large  / Bili: Negative / Urobili: Negative   Blood: x / Protein: 100 / Nitrite: Negative   Leuk Esterase: Negative / RBC: 93 /hpf / WBC 5 /HPF   Sq Epi: x / Non Sq Epi: 3 /hpf / Bacteria: Few        RADIOLOGY & ADDITIONAL STUDIES:    A/P:    Allie Arias PGY2 Gyn Onc Consult Note    REBECCA CALL  45y  Female [id number]    HPI: 46 y/o G0, LMP 96/20, admitted with abdominal pain. Patient s/p ED visit 9/4 with same complaint. Found to have large complex multiseptated pelvic mass with possible peritoneal carcinomatosis at that time concerning for ovarian malignancy. GYN consulted, recommended tumor markers and outpatient follow up with GYN ONC. Patient admitted today for persistant abdominal pain. Patient reports that pain since ED visit has continued and has not responded to Tylenol and Oxy. Patient not taking NSAIDs as she is allergic. Patient also endorses nausea with infrequent vomiting. Unable to tolerating full PO secondary to pain and decreased appetite. Also reports not passing gas or having a bowel movement since three days ago. Normally patient has a bowel movement daily. Reports small caliber stools. Denies bloody stool, tarry stools. Denies fevers, chills, urinary symptoms. Endorses some shortness of breath when laying flat. Denies mood symptoms currently.     Name of GYN Physician: Dr. Ocasio, [facility]    OBHx: G0, virginal  GYNHx: Denies fibroids, cysts, endometriosis, STI's, Abnormal pap smears.  PMH: pulmonary valve stenosis, heart murmur, anxiety, depression Paranoid schizophrenia  PSH: denies  Meds: Prozac, Risperdal, reports compliance with medications at home  All: NSAIDs (angioedema)  Famhx: denies family history of breast, uterine, ovarian, colon cancers  Soc: lives at home with mother who is hard of hearing. gives permission for us to contact her brother Jevon Call  HCM: last pap 3 years ago wnl per patient, last mammo 1 year ago wnl per patient, never had colonoscopy    Vital Signs Last 24 Hrs  T(C): 37.5 (08 Sep 2020 15:45), Max: 37.5 (08 Sep 2020 15:45)  T(F): 99.5 (08 Sep 2020 15:45), Max: 99.5 (08 Sep 2020 15:45)  HR: 113 (08 Sep 2020 15:45) (110 - 138)  BP: 134/90 (08 Sep 2020 15:45) (120/95 - 139/92)  BP(mean): --  RR: 18 (08 Sep 2020 15:45) (16 - 38)  SpO2: 95% (08 Sep 2020 15:45) (91% - 99%)    Physical Exam:   General: sitting comfortably in bed, NAD   CV: tachycardic, regular rhythm  Lungs: CTAB  Abd: moderately distended, non focally tender, no rebound/guarding  : patient refused secondary to abdominal discomfort    Ext: non-tender b/l, no edema       LABS:                              12.0   12.75 )-----------( 413      ( 08 Sep 2020 06:20 )             38.4     09-08    137  |  101  |  16  ----------------------------<  118<H>  4.2   |  19<L>  |  0.58    Ca    8.8      08 Sep 2020 06:20    TPro  7.1  /  Alb  3.2<L>  /  TBili  0.5  /  DBili  x   /  AST  15  /  ALT  11  /  AlkPhos  98  09-08    I&O's Detail    PT/INR - ( 08 Sep 2020 01:24 )   PT: 12.7 sec;   INR: 1.07 ratio         PTT - ( 08 Sep 2020 01:24 )  PTT:27.8 sec      Urinalysis Basic - ( 08 Sep 2020 04:33 )  Color: Yellow / Appearance: Clear / SG: >1.050 / pH: x  Gluc: x / Ketone: Large  / Bili: Negative / Urobili: Negative   Blood: x / Protein: 100 / Nitrite: Negative   Leuk Esterase: Negative / RBC: 93 /hpf / WBC 5 /HPF   Sq Epi: x / Non Sq Epi: 3 /hpf / Bacteria: Few        RADIOLOGY & ADDITIONAL STUDIES:    EXAM:  CT ANGIO CHEST (W)AW IC                        PROCEDURE DATE:  09/08/2020    INTERPRETATION:  EXAMINATION:  CTA CHEST WITH IV CONTRAST, CT PULMONARY ANGIOGRAM  DATE OF EXAM: 9/8/2020 2:24 AM  HISTORY: Shortness of breath x1 day  TECHNIQUE: CTA examination of the chest was performed following the intravenous administration of 90 mL Omnipaque 350.  10 mL Omnipaque 350 discarded. Sagittal, coronal and 3-D reformatted images were provided. CT dose lowering techniques were used, to include: automated exposure control, adjustment for patient size, and or use of iterative reconstruction.  COMPARISON: None.    FINDINGS:  Lungs: Dependent changes in the lung bases. Central airways are clear.  Pleura: No effusion or pneumothorax.  Mediastinum and Eliana: No adenopathy or hemorrhage.  Pulmonary Arteries: Well opacified to the distal segmental level. Within the limitations created by motion artifact, no acute thromboembolus. Main pulmonary artery is enlarged.  Cardiovascular: Right heart is mildly enlarged. Thoracic aorta is normal in caliber without dissection  Upper Abdomen: Perihepatic ascites  Chest Wall: Within normal limits.  Musculoskeletal: Unremarkable for age.    IMPRESSION:  1.  No pulmonary embolus.  2.  Pulmonary artery hypertension and enlarged right heart. Correlate for cor pulmonale.        EXAM:  CT ABDOMEN AND PELVIS IC                        PROCEDURE DATE:  09/04/2020    INTERPRETATION:  CLINICAL INFORMATION: Abdominal distention  COMPARISON: None.  PROCEDURE:  CT of the Abdomen and Pelvis was performed with intravenous contrast.  Intravenous contrast: 90 ml Omnipaque 350. 10 ml discarded.  Oral contrast: None.  Sagittal and coronal reformats were performed.    FINDINGS:  LOWER CHEST: Mild bibasilar subsegmental atelectasis. No consolidation or pleural effusion.  LIVER: Within normal limits.  BILE DUCTS: Normal caliber.  GALLBLADDER: Within normal limits.  SPLEEN: Within normal limits.  PANCREAS: Within normal limits.  ADRENALS: Within normal limits.  KIDNEYS/URETERS: Absent left kidney. Right kidney enhances homogeneously without hydronephrosis.  BLADDER: Inadequately distended.  REPRODUCTIVE ORGANS: Large complex multiseptated pelvic mass appears to originate from left adnexa, largest component of the mass measuring 12.3 x 11 x 9.2 cm (TR x AP x CC). Additional right adnexal mass measuring 4.6 x 3.1 cm. Suggestion of fibroid uterus.  BOWEL: No bowel obstruction. Appendix is not visualized. No evidence of inflammation in the pericecal region.  PERITONEUM: Small volume ascites, which measures higher in attenuation than simple fluid. Mesenteric haziness adjacent to the pelvic mass with suggestion of omental caking or peritoneal implants.  VESSELS: Within normal limits.  RETROPERITONEUM/LYMPH NODES: 1.6 cm periportal lymph node) 2:43).  ABDOMINAL WALL: Tiny fat-containing umbilical hernia.  BONES: Spinal degenerative changes.    IMPRESSION:  Large complex multiseptated pelvic mass appears to originate from left adnexa, concerning for ovarian malignancy. Additional right adnexal mass measuring 4.6 x 3.1 cm. Suggestion of fibroid uterus. GYN consultation and further evaluation with pelvic ultrasound as indicated.  Small volume ascites, which measures higher in attenuation than simple fluid. Mesenteric haziness adjacent to the pelvic mass with suggestion of omental caking or peritoneal implants.  No bowel obstruction.        EXAM:  US PELVIC COMPLETE                        PROCEDURE DATE:  09/04/2020    INTERPRETATION:  CLINICAL INFORMATION: Pelvic mass on CT, generalized abdominal pain  LMP: 8/16/2020  COMPARISON: CT abdomen and pelvis from same day  TECHNIQUE:  Transabdominal pelvic sonogram only.    FINDINGS:  Uterus: 7.8 x 3.6 x 4.8 cm. Questionable 4.7 x 4.7 x 4.3 anterior uterine fibroid.  Endometrium: 4 mm. Within normal limits.  Neither ovary is well visualized. Reidentification of a large complex partially cystic septated mass within the lower pelvis, better evaluated on CT from same day. The exam is additionally limited as patient was unable to move and tolerate transvaginal ultrasound.  Fluid: Unclear whether there is intra-abdominal free fluid or whether it is all contained within the large complex mass.    IMPRESSION:  Limited exam. Again seen is a large complex partially cystic septated mass of the lower pelvis, which is better evaluated on CT from same day. If further evaluation with imaging is required, MRI of the pelvis with IV contrast should be obtained. Gyn Onc Consult Note    REBECCA CALL  45y  Female [id number]    HPI: 44 y/o G0, LMP 96/20, admitted with abdominal pain. Patient s/p ED visit 9/4 with same complaint. Found to have large complex multiseptated pelvic mass with possible peritoneal carcinomatosis at that time concerning for ovarian malignancy. GYN consulted, recommended tumor markers and outpatient follow up with GYN ONC. Patient admitted today for persistant abdominal pain. Patient reports that pain since ED visit has continued and has not responded to Tylenol and Oxy. Patient not taking NSAIDs as she is allergic. Patient also endorses nausea with infrequent vomiting. Unable to tolerating full PO secondary to pain and decreased appetite. Also reports not passing gas or having a bowel movement since three days ago. Normally patient has a bowel movement daily. Reports small caliber stools. Denies bloody stool, tarry stools. Denies fevers, chills, urinary symptoms. Endorses some shortness of breath when laying flat. Denies mood symptoms currently.     Name of GYN Physician: Dr. Ocasio, [facility], Phone: [phone]    OBHx: G0, virginal  GYNHx: Denies fibroids, cysts, endometriosis, STI's, Abnormal pap smears.  PMH: pulmonary valve stenosis, heart murmur, anxiety, depression Paranoid schizophrenia  PSH: denies  Meds: Prozac, Risperdal, reports compliance with medications at home  All: NSAIDs (angioedema)  Famhx: denies family history of breast, uterine, ovarian, colon cancers  Soc: lives at home with mother who is hard of hearing. gives permission for us to contact her brother Jevon Call  HCM: last pap 7/23/2018 NIL per phone conversation w/ pt's OB/GYN office. last mammo 1 year ago wnl per patient, never had colonoscopy    Vital Signs Last 24 Hrs  T(C): 37.5 (08 Sep 2020 15:45), Max: 37.5 (08 Sep 2020 15:45)  T(F): 99.5 (08 Sep 2020 15:45), Max: 99.5 (08 Sep 2020 15:45)  HR: 113 (08 Sep 2020 15:45) (110 - 138)  BP: 134/90 (08 Sep 2020 15:45) (120/95 - 139/92)  BP(mean): --  RR: 18 (08 Sep 2020 15:45) (16 - 38)  SpO2: 95% (08 Sep 2020 15:45) (91% - 99%)    Physical Exam:   General: sitting comfortably in bed, NAD   CV: tachycardic, regular rhythm  Lungs: CTAB  Abd: moderately distended, non focally tender, no rebound/guarding  : patient refused secondary to abdominal discomfort    Ext: non-tender b/l, no edema       LABS:                              12.0   12.75 )-----------( 413      ( 08 Sep 2020 06:20 )             38.4     09-08    137  |  101  |  16  ----------------------------<  118<H>  4.2   |  19<L>  |  0.58    Ca    8.8      08 Sep 2020 06:20    TPro  7.1  /  Alb  3.2<L>  /  TBili  0.5  /  DBili  x   /  AST  15  /  ALT  11  /  AlkPhos  98  09-08    I&O's Detail    PT/INR - ( 08 Sep 2020 01:24 )   PT: 12.7 sec;   INR: 1.07 ratio         PTT - ( 08 Sep 2020 01:24 )  PTT:27.8 sec      Urinalysis Basic - ( 08 Sep 2020 04:33 )  Color: Yellow / Appearance: Clear / SG: >1.050 / pH: x  Gluc: x / Ketone: Large  / Bili: Negative / Urobili: Negative   Blood: x / Protein: 100 / Nitrite: Negative   Leuk Esterase: Negative / RBC: 93 /hpf / WBC 5 /HPF   Sq Epi: x / Non Sq Epi: 3 /hpf / Bacteria: Few        RADIOLOGY & ADDITIONAL STUDIES:    EXAM:  CT ANGIO CHEST (W)AW IC                        PROCEDURE DATE:  09/08/2020    INTERPRETATION:  EXAMINATION:  CTA CHEST WITH IV CONTRAST, CT PULMONARY ANGIOGRAM  DATE OF EXAM: 9/8/2020 2:24 AM  HISTORY: Shortness of breath x1 day  TECHNIQUE: CTA examination of the chest was performed following the intravenous administration of 90 mL Omnipaque 350.  10 mL Omnipaque 350 discarded. Sagittal, coronal and 3-D reformatted images were provided. CT dose lowering techniques were used, to include: automated exposure control, adjustment for patient size, and or use of iterative reconstruction.  COMPARISON: None.    FINDINGS:  Lungs: Dependent changes in the lung bases. Central airways are clear.  Pleura: No effusion or pneumothorax.  Mediastinum and Eliana: No adenopathy or hemorrhage.  Pulmonary Arteries: Well opacified to the distal segmental level. Within the limitations created by motion artifact, no acute thromboembolus. Main pulmonary artery is enlarged.  Cardiovascular: Right heart is mildly enlarged. Thoracic aorta is normal in caliber without dissection  Upper Abdomen: Perihepatic ascites  Chest Wall: Within normal limits.  Musculoskeletal: Unremarkable for age.    IMPRESSION:  1.  No pulmonary embolus.  2.  Pulmonary artery hypertension and enlarged right heart. Correlate for cor pulmonale.        EXAM:  CT ABDOMEN AND PELVIS IC                        PROCEDURE DATE:  09/04/2020    INTERPRETATION:  CLINICAL INFORMATION: Abdominal distention  COMPARISON: None.  PROCEDURE:  CT of the Abdomen and Pelvis was performed with intravenous contrast.  Intravenous contrast: 90 ml Omnipaque 350. 10 ml discarded.  Oral contrast: None.  Sagittal and coronal reformats were performed.    FINDINGS:  LOWER CHEST: Mild bibasilar subsegmental atelectasis. No consolidation or pleural effusion.  LIVER: Within normal limits.  BILE DUCTS: Normal caliber.  GALLBLADDER: Within normal limits.  SPLEEN: Within normal limits.  PANCREAS: Within normal limits.  ADRENALS: Within normal limits.  KIDNEYS/URETERS: Absent left kidney. Right kidney enhances homogeneously without hydronephrosis.  BLADDER: Inadequately distended.  REPRODUCTIVE ORGANS: Large complex multiseptated pelvic mass appears to originate from left adnexa, largest component of the mass measuring 12.3 x 11 x 9.2 cm (TR x AP x CC). Additional right adnexal mass measuring 4.6 x 3.1 cm. Suggestion of fibroid uterus.  BOWEL: No bowel obstruction. Appendix is not visualized. No evidence of inflammation in the pericecal region.  PERITONEUM: Small volume ascites, which measures higher in attenuation than simple fluid. Mesenteric haziness adjacent to the pelvic mass with suggestion of omental caking or peritoneal implants.  VESSELS: Within normal limits.  RETROPERITONEUM/LYMPH NODES: 1.6 cm periportal lymph node) 2:43).  ABDOMINAL WALL: Tiny fat-containing umbilical hernia.  BONES: Spinal degenerative changes.    IMPRESSION:  Large complex multiseptated pelvic mass appears to originate from left adnexa, concerning for ovarian malignancy. Additional right adnexal mass measuring 4.6 x 3.1 cm. Suggestion of fibroid uterus. GYN consultation and further evaluation with pelvic ultrasound as indicated.  Small volume ascites, which measures higher in attenuation than simple fluid. Mesenteric haziness adjacent to the pelvic mass with suggestion of omental caking or peritoneal implants.  No bowel obstruction.        EXAM:  US PELVIC COMPLETE                        PROCEDURE DATE:  09/04/2020    INTERPRETATION:  CLINICAL INFORMATION: Pelvic mass on CT, generalized abdominal pain  LMP: 8/16/2020  COMPARISON: CT abdomen and pelvis from same day  TECHNIQUE:  Transabdominal pelvic sonogram only.    FINDINGS:  Uterus: 7.8 x 3.6 x 4.8 cm. Questionable 4.7 x 4.7 x 4.3 anterior uterine fibroid.  Endometrium: 4 mm. Within normal limits.  Neither ovary is well visualized. Reidentification of a large complex partially cystic septated mass within the lower pelvis, better evaluated on CT from same day. The exam is additionally limited as patient was unable to move and tolerate transvaginal ultrasound.  Fluid: Unclear whether there is intra-abdominal free fluid or whether it is all contained within the large complex mass.    IMPRESSION:  Limited exam. Again seen is a large complex partially cystic septated mass of the lower pelvis, which is better evaluated on CT from same day. If further evaluation with imaging is required, MRI of the pelvis with IV contrast should be obtained.

## 2020-09-08 NOTE — H&P ADULT - PROBLEM SELECTOR PLAN 1
multi factorial including pain , anxiety , some dehydration as evidence by increased specific gravity ,  cardiac tracing is sinus rhythm ,  No PE seen on CTA elevated ddimer may be 2/2 to malignancy , will treat above factors and monitor on telemetry  - treat pain   - s/p 2L IV hydration - additional IV fluids as needed   - telemetry   - f/u TSH ( added to initial labs)

## 2020-09-08 NOTE — ED PROVIDER NOTE - PHYSICAL EXAMINATION
Const: Well-nourished, Well-developed, appearing stated age.  Eyes: no conjunctival injection, and symmetrical lids.  HEENT: Head NCAT, no lesions. Atraumatic external nose and ears. Moist MM.  Neck: Symmetric, trachea midline.   CVS: +S1/S2, Peripheral pulses 2+ and equal in all extremities.  RESP: Unlabored respiratory effort. Clear to auscultation bilaterally.  GI: +diffusely tender lower abdomen. Nondistended, No CVA tenderness b/l.   MSK: Normocephalic/Atraumatic, Lower Extremities w/o calf tenderness or edema b/l.   Skin: Warm, dry and intact.   Neuro: CNs II-XII grossly intact. Motor & Sensation grossly intact.  Psych: Awake, Alert, & Oriented (AAO) x3. Appropriate mood and affect.

## 2020-09-08 NOTE — PROVIDER CONTACT NOTE (OTHER) - ASSESSMENT
Pt A&OX4. VS as documented. No changes. Pt vomited x1 - green color, pt states that it is the same color as earlier

## 2020-09-08 NOTE — ED ADULT NURSE REASSESSMENT NOTE - NS ED NURSE REASSESS COMMENT FT1
pt ambulated independently to bathroom, steady gait noted. denies dizziness when walking. when asked if short of breath while walking, pt states "I may have been a little short of breath". pt now sitting in bed, denies any shortness of breath at this time.

## 2020-09-08 NOTE — H&P ADULT - ASSESSMENT
45 year old female w/pmh Paranoid Schizophrenia, anxiety/ depression , recently diagnosed pelvic mass,  presents for persistent abdominal pain, tachycardic , tachypneic and labs w/ leukocytosis

## 2020-09-08 NOTE — ED PROVIDER NOTE - ATTENDING CONTRIBUTION TO CARE
Pt presents w persistent lower abd pain s/p recent dx of large L ovarian mass concerning for malignancy. pt had been evaluated by gyn at her last ED visit when this mass was found and has outpt f/u scheduled for further eval and tx of this. pt is here due to persistent pain. pt found to be very tachycardic at 130s on arrival - endorsing mild SOB. while in ED pt also found to be somewhat hypoxic requring O2 via NC. concern for PE given tachycardia and hypoxia. will check labs to r/o acute change from last weeks visit, r/o infectious etiology although seems less likely, will get CTA to r/o PE. dispo pending w/u but expect admission.

## 2020-09-08 NOTE — H&P ADULT - NSHPSOCIALHISTORY_GEN_ALL_CORE
Social History:    Marital Status:  (   )    (  x ) Single    (   )    (  )   Occupation: part time legal worker   Lives with: (  ) alone  (  ) children   (  ) spouse   ( x ) parents  (  ) other    Substance Use (street drugs): (x  ) never used  (  ) other:  Tobacco Usage:  (x   ) never smoked   (   ) former smoker   (   ) current smoker  (     ) pack year  (        ) last cigarette date  Alcohol Usage: no etoh use

## 2020-09-08 NOTE — ED ADULT NURSE NOTE - NSIMPLEMENTINTERV_GEN_ALL_ED
Implemented All Universal Safety Interventions:  [place] to call system. Call bell, personal items and telephone within reach. Instruct patient to call for assistance. Room bathroom lighting operational. Non-slip footwear when patient is off stretcher. Physically safe environment: no spills, clutter or unnecessary equipment. Stretcher in lowest position, wheels locked, appropriate side rails in place.

## 2020-09-08 NOTE — CHART NOTE - NSCHARTNOTEFT_GEN_A_CORE
abdominal pain likely due to locally advanced ovarian cancer.  morphine and Zofran prn  f/up updated GYN recs.    CT C/A/P shows no PE.  LE duplex ordered.  f/up TTE and cardiology recs    Schizophrenia under control.    Failure to thrive due to locally advanced ovarian cancer.    patient with locally advanced cancer; higher risk for future complications despite optimal medical therapy.    plan of care discussed with floor NP/PA and bedside RN.    Aryan Teresa MD, MHA, FACP, [facility]  Pager: [phone]  If no response or off-hours, page [phone]

## 2020-09-08 NOTE — H&P ADULT - NSHPPHYSICALEXAM_GEN_ALL_CORE
Vital Signs Last 24 Hrs  T(C): 36.8 (08 Sep 2020 04:08), Max: 36.8 (08 Sep 2020 04:08)  T(F): 98.2 (08 Sep 2020 04:08), Max: 98.2 (08 Sep 2020 04:08)  HR: 128 (08 Sep 2020 04:08) (121 - 138)  BP: 136/83 (08 Sep 2020 04:08) (125/86 - 139/92)  BP(mean): --  RR: 32 (08 Sep 2020 04:08) (16 - 38)  SpO2: 98% (08 Sep 2020 04:08) (91% - 98%)    GENERAL: No acute distress, well-developed, anxious appearing , tachypneic breathing non labored   HEAD:  Atraumatic, Normocephalic  ENT: EOMI, PERRLA, conjunctiva and sclera clear,  moist mucosa no pharyngeal erythema or exudates   NECK: supple , no JVD   CHEST/LUNG: Clear to auscultation bilaterally; No wheeze, equal breath sounds bilaterally   BACK: No spinal tenderness,  No CVA tenderness   HEART: Regular rhythm tachycardic ; soft click , No murmurs, rubs, or gallops  ABDOMEN: Soft, Nontender, Nondistended; Bowel sounds present  EXTREMITIES:  No clubbing, cyanosis, trace edema  MSK: No joint swelling or effusions, ROM intact   PSYCH:  anxious appearing , flat affect  NEUROLOGY: AAOx3, non-focal, cranial nerves intact  SKIN: Normal color, No rashes or lesions

## 2020-09-09 ENCOUNTER — TRANSCRIPTION ENCOUNTER (OUTPATIENT)
Age: 45
End: 2020-09-09

## 2020-09-09 VITALS
OXYGEN SATURATION: 92 % | SYSTOLIC BLOOD PRESSURE: 133 MMHG | HEART RATE: 115 BPM | TEMPERATURE: 98 F | RESPIRATION RATE: 19 BRPM | DIASTOLIC BLOOD PRESSURE: 86 MMHG

## 2020-09-09 DIAGNOSIS — Z86.79 PERSONAL HISTORY OF OTHER DISEASES OF THE CIRCULATORY SYSTEM: ICD-10-CM

## 2020-09-09 LAB
ALBUMIN SERPL ELPH-MCNC: 3.3 G/DL — SIGNIFICANT CHANGE UP (ref 3.3–5)
ALP SERPL-CCNC: 88 U/L — SIGNIFICANT CHANGE UP (ref 40–120)
ALT FLD-CCNC: 10 U/L — SIGNIFICANT CHANGE UP (ref 10–45)
ANION GAP SERPL CALC-SCNC: 18 MMOL/L — HIGH (ref 5–17)
AST SERPL-CCNC: 14 U/L — SIGNIFICANT CHANGE UP (ref 10–40)
BASOPHILS # BLD AUTO: 0.03 K/UL — SIGNIFICANT CHANGE UP (ref 0–0.2)
BASOPHILS NFR BLD AUTO: 0.2 % — SIGNIFICANT CHANGE UP (ref 0–2)
BILIRUB SERPL-MCNC: 0.4 MG/DL — SIGNIFICANT CHANGE UP (ref 0.2–1.2)
BUN SERPL-MCNC: 17 MG/DL — SIGNIFICANT CHANGE UP (ref 7–23)
CALCIUM SERPL-MCNC: 9.2 MG/DL — SIGNIFICANT CHANGE UP (ref 8.4–10.5)
CHLORIDE SERPL-SCNC: 98 MMOL/L — SIGNIFICANT CHANGE UP (ref 96–108)
CO2 SERPL-SCNC: 23 MMOL/L — SIGNIFICANT CHANGE UP (ref 22–31)
CREAT SERPL-MCNC: 0.59 MG/DL — SIGNIFICANT CHANGE UP (ref 0.5–1.3)
EOSINOPHIL # BLD AUTO: 0.01 K/UL — SIGNIFICANT CHANGE UP (ref 0–0.5)
EOSINOPHIL NFR BLD AUTO: 0.1 % — SIGNIFICANT CHANGE UP (ref 0–6)
GLUCOSE BLDC GLUCOMTR-MCNC: 135 MG/DL — HIGH (ref 70–99)
GLUCOSE SERPL-MCNC: 128 MG/DL — HIGH (ref 70–99)
HCT VFR BLD CALC: 38 % — SIGNIFICANT CHANGE UP (ref 34.5–45)
HGB BLD-MCNC: 12 G/DL — SIGNIFICANT CHANGE UP (ref 11.5–15.5)
IMM GRANULOCYTES NFR BLD AUTO: 2.4 % — HIGH (ref 0–1.5)
LYMPHOCYTES # BLD AUTO: 0.52 K/UL — LOW (ref 1–3.3)
LYMPHOCYTES # BLD AUTO: 3.3 % — LOW (ref 13–44)
MCHC RBC-ENTMCNC: 28.6 PG — SIGNIFICANT CHANGE UP (ref 27–34)
MCHC RBC-ENTMCNC: 31.6 GM/DL — LOW (ref 32–36)
MCV RBC AUTO: 90.7 FL — SIGNIFICANT CHANGE UP (ref 80–100)
MONOCYTES # BLD AUTO: 0.83 K/UL — SIGNIFICANT CHANGE UP (ref 0–0.9)
MONOCYTES NFR BLD AUTO: 5.2 % — SIGNIFICANT CHANGE UP (ref 2–14)
NEUTROPHILS # BLD AUTO: 14.09 K/UL — HIGH (ref 1.8–7.4)
NEUTROPHILS NFR BLD AUTO: 88.8 % — HIGH (ref 43–77)
NRBC # BLD: 0 /100 WBCS — SIGNIFICANT CHANGE UP (ref 0–0)
PLATELET # BLD AUTO: 443 K/UL — HIGH (ref 150–400)
POTASSIUM SERPL-MCNC: 4.2 MMOL/L — SIGNIFICANT CHANGE UP (ref 3.5–5.3)
POTASSIUM SERPL-SCNC: 4.2 MMOL/L — SIGNIFICANT CHANGE UP (ref 3.5–5.3)
PROCALCITONIN SERPL-MCNC: 0.37 NG/ML — HIGH (ref 0.02–0.1)
PROT SERPL-MCNC: 6.7 G/DL — SIGNIFICANT CHANGE UP (ref 6–8.3)
RBC # BLD: 4.19 M/UL — SIGNIFICANT CHANGE UP (ref 3.8–5.2)
RBC # FLD: 13.9 % — SIGNIFICANT CHANGE UP (ref 10.3–14.5)
SODIUM SERPL-SCNC: 139 MMOL/L — SIGNIFICANT CHANGE UP (ref 135–145)
WBC # BLD: 15.86 K/UL — HIGH (ref 3.8–10.5)
WBC # FLD AUTO: 15.86 K/UL — HIGH (ref 3.8–10.5)

## 2020-09-09 PROCEDURE — 84484 ASSAY OF TROPONIN QUANT: CPT

## 2020-09-09 PROCEDURE — 84145 PROCALCITONIN (PCT): CPT

## 2020-09-09 PROCEDURE — 83880 ASSAY OF NATRIURETIC PEPTIDE: CPT

## 2020-09-09 PROCEDURE — 84295 ASSAY OF SERUM SODIUM: CPT

## 2020-09-09 PROCEDURE — 71275 CT ANGIOGRAPHY CHEST: CPT

## 2020-09-09 PROCEDURE — 99222 1ST HOSP IP/OBS MODERATE 55: CPT | Mod: GC

## 2020-09-09 PROCEDURE — 87040 BLOOD CULTURE FOR BACTERIA: CPT

## 2020-09-09 PROCEDURE — 74176 CT ABD & PELVIS W/O CONTRAST: CPT | Mod: 26

## 2020-09-09 PROCEDURE — 85379 FIBRIN DEGRADATION QUANT: CPT

## 2020-09-09 PROCEDURE — 85014 HEMATOCRIT: CPT

## 2020-09-09 PROCEDURE — 74018 RADEX ABDOMEN 1 VIEW: CPT

## 2020-09-09 PROCEDURE — 99285 EMERGENCY DEPT VISIT HI MDM: CPT | Mod: 25

## 2020-09-09 PROCEDURE — 82803 BLOOD GASES ANY COMBINATION: CPT

## 2020-09-09 PROCEDURE — 82947 ASSAY GLUCOSE BLOOD QUANT: CPT

## 2020-09-09 PROCEDURE — 83605 ASSAY OF LACTIC ACID: CPT

## 2020-09-09 PROCEDURE — 85027 COMPLETE CBC AUTOMATED: CPT

## 2020-09-09 PROCEDURE — 96374 THER/PROPH/DIAG INJ IV PUSH: CPT | Mod: XU

## 2020-09-09 PROCEDURE — 84443 ASSAY THYROID STIM HORMONE: CPT

## 2020-09-09 PROCEDURE — 82330 ASSAY OF CALCIUM: CPT

## 2020-09-09 PROCEDURE — 85018 HEMOGLOBIN: CPT

## 2020-09-09 PROCEDURE — 93970 EXTREMITY STUDY: CPT

## 2020-09-09 PROCEDURE — 99233 SBSQ HOSP IP/OBS HIGH 50: CPT

## 2020-09-09 PROCEDURE — 82435 ASSAY OF BLOOD CHLORIDE: CPT

## 2020-09-09 PROCEDURE — 93005 ELECTROCARDIOGRAM TRACING: CPT

## 2020-09-09 PROCEDURE — 85610 PROTHROMBIN TIME: CPT

## 2020-09-09 PROCEDURE — 74176 CT ABD & PELVIS W/O CONTRAST: CPT

## 2020-09-09 PROCEDURE — 81001 URINALYSIS AUTO W/SCOPE: CPT

## 2020-09-09 PROCEDURE — U0003: CPT

## 2020-09-09 PROCEDURE — 85730 THROMBOPLASTIN TIME PARTIAL: CPT

## 2020-09-09 PROCEDURE — 82962 GLUCOSE BLOOD TEST: CPT

## 2020-09-09 PROCEDURE — 76856 US EXAM PELVIC COMPLETE: CPT

## 2020-09-09 PROCEDURE — 80053 COMPREHEN METABOLIC PANEL: CPT

## 2020-09-09 PROCEDURE — 80048 BASIC METABOLIC PNL TOTAL CA: CPT

## 2020-09-09 PROCEDURE — 84132 ASSAY OF SERUM POTASSIUM: CPT

## 2020-09-09 PROCEDURE — 85025 COMPLETE CBC W/AUTO DIFF WBC: CPT

## 2020-09-09 RX ORDER — FLUOXETINE HCL 10 MG
1 CAPSULE ORAL
Qty: 0 | Refills: 0 | DISCHARGE
Start: 2020-09-09

## 2020-09-09 RX ORDER — RISPERIDONE 4 MG/1
1 TABLET ORAL
Qty: 0 | Refills: 0 | DISCHARGE

## 2020-09-09 RX ORDER — PROCHLORPERAZINE MALEATE 5 MG
10 TABLET ORAL ONCE
Refills: 0 | Status: DISCONTINUED | OUTPATIENT
Start: 2020-09-09 | End: 2020-09-09

## 2020-09-09 RX ORDER — RISPERIDONE 4 MG/1
1 TABLET ORAL
Qty: 0 | Refills: 0 | DISCHARGE
Start: 2020-09-09

## 2020-09-09 RX ORDER — PROCHLORPERAZINE MALEATE 5 MG
10 TABLET ORAL ONCE
Refills: 0 | Status: COMPLETED | OUTPATIENT
Start: 2020-09-09 | End: 2020-09-09

## 2020-09-09 RX ORDER — SODIUM CHLORIDE 9 MG/ML
0 INJECTION, SOLUTION INTRAVENOUS
Qty: 0 | Refills: 0 | DISCHARGE
Start: 2020-09-09

## 2020-09-09 RX ORDER — SODIUM CHLORIDE 9 MG/ML
1000 INJECTION, SOLUTION INTRAVENOUS
Refills: 0 | Status: DISCONTINUED | OUTPATIENT
Start: 2020-09-09 | End: 2020-09-09

## 2020-09-09 RX ORDER — ONDANSETRON 8 MG/1
0 TABLET, FILM COATED ORAL
Qty: 0 | Refills: 0 | DISCHARGE
Start: 2020-09-09

## 2020-09-09 RX ORDER — MORPHINE SULFATE 50 MG/1
0 CAPSULE, EXTENDED RELEASE ORAL
Qty: 0 | Refills: 0 | DISCHARGE
Start: 2020-09-09

## 2020-09-09 RX ORDER — FLUOXETINE HCL 10 MG
1 CAPSULE ORAL
Qty: 0 | Refills: 0 | DISCHARGE

## 2020-09-09 RX ORDER — ENOXAPARIN SODIUM 100 MG/ML
0 INJECTION SUBCUTANEOUS
Qty: 0 | Refills: 0 | DISCHARGE
Start: 2020-09-09

## 2020-09-09 RX ADMIN — SODIUM CHLORIDE 75 MILLILITER(S): 9 INJECTION, SOLUTION INTRAVENOUS at 15:58

## 2020-09-09 RX ADMIN — ONDANSETRON 4 MILLIGRAM(S): 8 TABLET, FILM COATED ORAL at 05:26

## 2020-09-09 RX ADMIN — ONDANSETRON 4 MILLIGRAM(S): 8 TABLET, FILM COATED ORAL at 16:20

## 2020-09-09 RX ADMIN — Medication 10 MILLIGRAM(S): at 17:29

## 2020-09-09 RX ADMIN — ENOXAPARIN SODIUM 40 MILLIGRAM(S): 100 INJECTION SUBCUTANEOUS at 11:07

## 2020-09-09 RX ADMIN — SODIUM CHLORIDE 100 MILLILITER(S): 9 INJECTION, SOLUTION INTRAVENOUS at 00:51

## 2020-09-09 RX ADMIN — SODIUM CHLORIDE 100 MILLILITER(S): 9 INJECTION, SOLUTION INTRAVENOUS at 08:31

## 2020-09-09 RX ADMIN — Medication 10 MILLIGRAM(S): at 22:13

## 2020-09-09 NOTE — DISCHARGE NOTE PROVIDER - CARE PROVIDER_API CALL
Shana Horowitz)  Gynecologic Oncology; Obstetrics and Gynecology  [address] [place] [address]  Phone: [phone]  Fax: [phone]  Follow Up Time:

## 2020-09-09 NOTE — CONSULT NOTE ADULT - ASSESSMENT
45 year old female w/pmh Paranoid Schizophrenia, anxiety/ depression, recently diagnosed pelvic mass, presents for persistent abdominal pain associated with nausea and bilious vomiting, uptrending leukocytosis, found to have malignant SBO.

## 2020-09-09 NOTE — DISCHARGE NOTE PROVIDER - NSDCMRMEDTOKEN_GEN_ALL_CORE_FT
oxyCODONE 5 mg oral tablet: 1 tab(s) orally every 8 hours as needed for severe pain MDD:3  PROzac 40 mg oral capsule: 1 cap(s) orally once a day  RisperDAL 1 mg oral tablet: 1 tab(s) orally once a day Dextrose 5% in Lactated Ringers intravenous solution:  intravenous   enoxaparin:   morphine:   ondansetron 2 mg/mL injectable solution:  injectable

## 2020-09-09 NOTE — PROGRESS NOTE ADULT - PROBLEM SELECTOR PLAN 1
2/2 presumed locally advanced ovarian malignancy   - morphine IV for severe pain  - appreciate GYN input    will d/w GI and surgery new CT finding of dilated Jejunal loop and possible early SBO.    Is/Os    NPO for now with IVF.
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
## 2020-09-09 NOTE — PROGRESS NOTE ADULT - ATTENDING COMMENTS
plan of care discussed with floor NP/PA     patient with locally advanced cancer; higher risk for future complications despite optimal medical therapy     Aryan Teresa MD, MHA, FACP, [facility]  Pager: [phone]  If no response or off-hours, page [phone]

## 2020-09-09 NOTE — DISCHARGE NOTE PROVIDER - HOSPITAL COURSE
45 year old female w/pmh Paranoid Schizophrenia, anxiety/ depression , recently diagnosed pelvic mass,  presents for persistent abdominal pain, tachycardic , tachypneic and labs w/ leukocytosis          Problem/Plan - 1:    ·  Problem: Abdominal pain.  Plan: 2/2 presumed locally advanced ovarian malignancy     - morphine IV for severe pain    - appreciate GYN input        will d/w GI and surgery new CT finding of dilated Jejunal loop and possible early SBO.        Is/Os        NPO for now with IVF.         Received Cpmpazine 10mg IVP x2 doses today for Nausea         Problem/Plan - 2:    ·  Problem: Pelvic mass.  Plan: GYN recommends outpt f/up.          Problem/Plan - 3:    ·  Problem: SIRS (systemic inflammatory response syndrome).  Plan: no localizing symptoms of infection , CTA w/ no evidence of pulmonary infection , UA neg for infection, Covid negative , afebrile  normotensive.    Procalcitonin low.    Incentive spirometry for atelectasis.    No fever; monitor off abx.          Problem/Plan - 4:    ·  Problem: Tachycardia.  Plan: likely combination of dehydration, pain.    continue IVF    f/up TTE and cards recs.          Problem/Plan - 5:    ·  Problem: Anxiety.  Plan: - continue prozac.          Problem/Plan - 6:    Problem: Paranoid schizophrenia. Plan: - continue Risperdal.         Problem/Plan - 7:    ·  Problem: Need for prophylactic measure.  Plan: - LMWH for DVT ppx.         patient with locally advanced cancer; higher risk for future complications despite optimal medical therapy.            Patient for transfer to [facility] for tissue diagnosis and treatment planning per GYN/oncology    Patient's accepting Physician is Madelin Cespedes (GYN Oncology Attending)        Discussed with Hospital in charge        Diacussed with Patient, patient with capacity, agreeable for transfer to [facility]. 45 year old female w/pmh Paranoid Schizophrenia, anxiety/ depression , recently diagnosed pelvic mass,  presents for persistent abdominal pain, tachycardic , tachypneic and labs w/ leukocytosis          Problem/Plan - 1:    ·  Problem: Abdominal pain.  Plan: 2/2 presumed locally advanced ovarian malignancy     - morphine IV for severe pain    - appreciate GYN input        will d/w GI and surgery new CT finding of dilated Jejunal loop and possible early SBO.        Is/Os        NPO for now with IVF.         Received Cpmpazine 10mg IVP x2 doses today for Nausea         Problem/Plan - 2:    ·  Problem: Pelvic mass.  Plan: GYN recommends outpt f/up.          Problem/Plan - 3:    ·  Problem: SIRS (systemic inflammatory response syndrome).  Plan: no localizing symptoms of infection , CTA w/ no evidence of pulmonary infection , UA neg for infection, Covid negative , afebrile  normotensive.    Procalcitonin low.    Incentive spirometry for atelectasis.    No fever; monitor off abx.          Problem/Plan - 4:    ·  Problem: Tachycardia.  Plan: likely combination of dehydration, pain.    continue IVF    f/up TTE and cards recs.          Problem/Plan - 5:    ·  Problem: Anxiety.  Plan: - Prozac home regimen to resume when SBO resolves         Problem/Plan - 6:    Problem: Paranoid schizophrenia. Risperdal home regimen To resume when SBO resolves         Problem/Plan - 7:    ·  Problem: Need for prophylactic measure.  Plan: - LMWH for DVT ppx.         Patient with locally advanced cancer; higher risk for future complications despite optimal medical therapy.            Patient for transfer to [facility] for tissue diagnosis and treatment planning per GYN/oncology    Patient's accepting Physician is Madelin Cespedes (GYN Oncology Attending)        Discussed with Hospital in charge        Diacussed with Patient, patient with capacity, agreeable for transfer to [facility]. 45 year old female w/pmh Paranoid Schizophrenia, anxiety/ depression , recently diagnosed pelvic mass,  presents for persistent abdominal pain, tachycardic , tachypneic and labs w/ leukocytosis          Problem/Plan - 1:    ·  Problem: Abdominal pain.  Plan: 2/2 presumed locally advanced ovarian malignancy     - morphine IV for severe pain    - appreciate GYN input        will d/w GI and surgery new CT finding of dilated Jejunal loop and possible early SBO.        Is/Os        NPO for now with IVF.         Received Cpmpazine 10mg IVP x2 doses today for Nausea         Problem/Plan - 2:    ·  Problem: Pelvic mass.  Plan: GYN recommends outpt f/up.          Problem/Plan - 3:    ·  Problem: SIRS (systemic inflammatory response syndrome).  Plan: no localizing symptoms of infection , CTA w/ no evidence of pulmonary infection , UA neg for infection, Covid negative , afebrile  normotensive.    Procalcitonin low.    Incentive spirometry for atelectasis.    No fever; monitor off abx.          Problem/Plan - 4:    ·  Problem: Tachycardia.  Plan: likely combination of dehydration, pain.    continue IVF    f/up TTE and cards recs.          Problem/Plan - 5:    ·  Problem: Anxiety.  Plan: - Prozac home regimen to resume when SBO resolves         Problem/Plan - 6:    Problem: Paranoid schizophrenia. Risperdal home regimen To resume when SBO resolves         Problem/Plan - 7:    ·  Problem: Need for prophylactic measure.  Plan: - LMWH for DVT ppx.         Patient with locally advanced cancer; higher risk for future complications despite optimal medical therapy.            Patient for transfer to [facility] for tissue diagnosis and treatment planning per GYN/oncology    Patient's accepting Physician is Madelin Cespedes (GYN Oncology Attending)        Discussed with Hospital in charge and gyn Oncology        Diacussed with Patient, patient with capacity, agreeable for transfer to [facility]. 45 year old female w/pmh Paranoid Schizophrenia, anxiety/ depression , recently diagnosed pelvic mass,  presents for persistent abdominal pain, tachycardic , tachypneic and labs w/ leukocytosis          Problem/Plan - 1:    ·  Problem: Abdominal pain.  Plan: 2/2 presumed locally advanced ovarian malignancy     - morphine IV for severe pain    - appreciate GYN input        will d/w GI and surgery new CT finding of dilated Jejunal loop and possible early SBO.        Is/Os        NPO for now with IVF.         Nausea /vomiting /SBO        Npo with Iv fluids    NGT insertion if patient continue to vomit, to decompress the stomach     Serial abdominal exams    Hold PO medications now until SBO resolves    On Zofran PRN    Received Compazine 10mg IVP x2 doses today for Nausea    F/U with general surgery and GYn oncology         Problem/Plan - 2:    ·  Problem: Pelvic mass.  Plan: GYN recommends outpt f/up.          Problem/Plan - 3:    ·  Problem: SIRS (systemic inflammatory response syndrome).  Plan: no localizing symptoms of infection , CTA w/ no evidence of pulmonary infection , UA neg for infection, Covid negative , afebrile  normotensive.    Procalcitonin low.    Incentive spirometry for atelectasis.    No fever; monitor off abx.          Problem/Plan - 4:    ·  Problem: Tachycardia.  Plan: likely combination of dehydration, pain.    continue IVF    f/up TTE and cards recs.          Problem/Plan - 5:    ·  Problem: Anxiety.  Plan: - Prozac home regimen to resume when SBO resolves         Problem/Plan - 6:    Problem: Paranoid schizophrenia. Risperdal home regimen To resume when SBO resolves         Problem/Plan - 7:    ·  Problem: Need for prophylactic measure.  Plan: - LMWH for DVT ppx.         Patient with locally advanced cancer; higher risk for future complications despite optimal medical therapy.            Patient for transfer to [facility] for tissue diagnosis and treatment planning per GYN/oncology    Patient's accepting Physician is Madelin Cespedes (GYN Oncology Attending)        Discussed with Hospital in charge and gyn Oncology        Diacussed with Patient, patient with capacity, agreeable for transfer to [facility].

## 2020-09-09 NOTE — CONSULT NOTE ADULT - ATTENDING COMMENTS
Patient seen & examined @ 1800 with her father present at bedside.  History, labs & imaging were reviewed and exam reveals normal breast examination and pelvic exam with NEFG, normal vagina/cervix and large irregular mass filling the posterior cul de sac with extrinsic compression of the rectosigmoid.  Discussed the concern for ovarian cancer with peritoneal metastasis and standard management including cytoreductive surgery and platinum based chemotherapy.  I explained my recommendation to proceed with diagnostic laparoscopy to assess feasibility of optimal debulking, and, if confirmed, plan for ILEANA/BSO/debulking with possible bowel resection.  I presented the option to transfer to [facility] for continued care and coordination of surgery with TTE/cardiology preoperative risk assessment.  She understands the need for NGT decompression if she has recurrent emesis.  Ms. Catalan is in agreement with transfer to [facility] following my telephone conversation with her brother Jevon Catalan (HCP) as per her request in order to review the findings & recommendation.  Questions were answered to their apparent satisfaction during 45 minutes spent at the bedside.    Shana Horowitz MD
Advanced care planning was discussed with patient and family.  Advanced care planning forms were reviewed and discussed as appropriate.  Differential diagnosis and plan of care discussed with patient after the evaluation.   Pain assessed and judicious use of narcotics when appropriate was discussed.  Importance of Fall prevention discussed.  Counseling on Smoking and Alcohol cessation was offered when appropriate.  Counseling on Diet, exercise, and medication compliance was done.

## 2020-09-09 NOTE — CONSULT NOTE ADULT - SUBJECTIVE AND OBJECTIVE BOX
CHIEF COMPLAINT:    HISTORY OF PRESENT ILLNESS:    45 year old female w/pmh Paranoid Schizophrenia, anxiety/ depression , recently diagnosed pelvic mass,  presents for persistent abdominal pain. Patient reports diffuse abdominal pain , associated with nausea and one episode of vomiting . She reports some response to tylenol and oxycodone. She reports no chest pain , some shortness of breath mostly when laying flat,  she reports minimal lower extremity edema , no calf pain.   She has no fever or chills , no cough , no diarrhea , no dysuria. She reports decreased appetite and decreased po intake.   Patient was seen in the emergency room three days prior to admission for similar pain , imaging at that time revealed a  Large complex multiseptated pelvic mass    PAST MEDICAL & SURGICAL HISTORY:  H/O pulmonary valve stenosis  Heart murmur  Anxiety  Paranoid schizophrenia  No significant past surgical history          MEDICATIONS:  enoxaparin Injectable 40 milliGRAM(s) SubCutaneous daily        acetaminophen   Tablet .. 650 milliGRAM(s) Oral every 4 hours PRN  FLUoxetine 40 milliGRAM(s) Oral daily  morphine  - Injectable 4 milliGRAM(s) IV Push every 4 hours PRN  ondansetron Injectable 4 milliGRAM(s) IV Push every 6 hours PRN  risperiDONE   Tablet 1 milliGRAM(s) Oral daily        dextrose 5% + lactated ringers. 1000 milliLiter(s) IV Continuous <Continuous>  influenza   Vaccine 0.5 milliLiter(s) IntraMuscular once      FAMILY HISTORY:  FH: skin cancer  FH: diabetes mellitus      SOCIAL HISTORY:    [ ] Non-smoker  [ ] Smoker  [ ] Alcohol    Allergies    ibuprofen (Other)    Intolerances    	    REVIEW OF SYSTEMS:  CONSTITUTIONAL: No fever, weight loss, + fatigue  EYES: No eye pain, visual disturbances, or discharge  ENMT:  No difficulty hearing, tinnitus, vertigo; No sinus or throat pain  NECK: No pain or stiffness  RESPIRATORY: No cough, wheezing, chills or hemoptysis; No Shortness of Breath  CARDIOVASCULAR: No chest pain, palpitations, passing out, dizziness, or leg swelling  GASTROINTESTINAL:+ abdominal or epigastric pain. No nausea, vomiting, or hematemesis; No diarrhea or constipation. No melena or hematochezia.  GENITOURINARY: No dysuria, frequency, hematuria, or incontinence  NEUROLOGICAL: No headaches, memory loss, loss of strength, numbness, or tremors  SKIN: No itching, burning, rashes, or lesions   LYMPH Nodes: No enlarged glands  ENDOCRINE: No heat or cold intolerance; No hair loss  MUSCULOSKELETAL: No joint pain or swelling; No muscle, back, or extremity pain  PSYCHIATRIC: No depression, anxiety, mood swings, or difficulty sleeping  HEME/LYMPH: No easy bruising, or bleeding gums  ALLERY AND IMMUNOLOGIC: No hives or eczema	    [ ] All others negative	  [ ] Unable to obtain    PHYSICAL EXAM:  T(C): 36.8 (09-09-20 @ 20:03), Max: 36.8 (09-09-20 @ 16:50)  HR: 98 (09-09-20 @ 20:03) (98 - 120)  BP: 141/85 (09-09-20 @ 20:03) (134/82 - 141/85)  RR: 18 (09-09-20 @ 20:03) (18 - 18)  SpO2: 94% (09-09-20 @ 20:03) (94% - 94%)  Wt(kg): --  I&O's Summary    09 Sep 2020 07:01  -  09 Sep 2020 20:31  --------------------------------------------------------  IN: 1365 mL / OUT: 300 mL / NET: 1065 mL        Appearance: Uncomfortable appearing 	  HEENT:   Normal oral mucosa, PERRL, EOMI	  Lymphatic: No lymphadenopathy  Cardiovascular: tachy S1 S2, No JVD,+murmurs,   Respiratory: Decreased bs  Psychiatry: A & O x 3, Mood & affect appropriate  Gastrointestinal:  Soft, Non-tender, + BS	  Skin: No rashes, No ecchymoses, No cyanosis	  Neurologic: Non-focal  Extremities: Normal range of motion, No clubbing, cyanosis or edema  Vascular: Peripheral pulses palpable 2+ bilaterally    TELEMETRY: 	    ECG:  Sinus tachycardia   RADIOLOGY:    < from: CT Angio Chest w/ IV Cont (09.08.20 @ 02:24) >    EXAM:  CT ANGIO CHEST (W)AW IC                            PROCEDURE DATE:  09/08/2020            INTERPRETATION:  EXAMINATION:  CTA CHEST WITH IV CONTRAST, CT PULMONARY ANGIOGRAM    DATE OF EXAM: 9/8/2020 2:24 AM    HISTORY: Shortness of breath x1 day    TECHNIQUE: CTA examination of the chest was performed following the intravenous administration of 90 mL Omnipaque 350.  10 mL Omnipaque 350 discarded. Sagittal, coronal and 3-D reformatted images were provided. CT dose lowering techniques were used, to include: automated exposure control, adjustment for patient size, and or use of iterative reconstruction.    COMPARISON: None.    FINDINGS:    Lungs: Dependent changes in the lung bases. Central airways are clear.    Pleura: No effusion or pneumothorax.    Mediastinum and Eliana: No adenopathy or hemorrhage.    Pulmonary Arteries: Well opacified to the distal segmental level. Within the limitations created by motion artifact, no acute thromboembolus. Main pulmonary artery is enlarged.    Cardiovascular: Right heart is mildly enlarged. Thoracic aorta is normal in caliber without dissection    Upper Abdomen: Perihepatic ascites    Chest Wall: Within normal limits.    Musculoskeletal: Unremarkable for age.      IMPRESSION:    1.  No pulmonary embolus.  2.  Pulmonary artery hypertension and enlarged right heart. Correlate for cor pulmonale.                        JOSE G ALMAZAN M.D., ATTENDING RADIOLOGIST  This document has been electronically signed. Sep  8 2020  2:37AM              < from: [facility] Duplex Lower Ext Vein Scan, Bilat (09.08.20 @ 18:57) >    EXAM:  DUPLEX SCAN EXT VEINS LOWER BI                            PROCEDURE DATE:  09/08/2020            INTERPRETATION:  CLINICAL INFORMATION: Rapid heart rate, evaluate for DVT    COMPARISON: None available.    TECHNIQUE: Duplex sonography of the BILATERAL LOWER extremity veins with color and spectral Doppler, with and without compression.    FINDINGS: There are bilateral Baker cysts.    There is normal compressibility of the bilateral common femoral, femoral and popliteal veins.  Doppler examination shows normal spontaneous and phasic flow.    No calf vein thrombosis is detected.    IMPRESSION:  No evidence of deep venous thrombosis in either lower extremity.                      MARC IBRAHIM M.D., ATTENDING RADIOLOGIST  This document has been electronically signed. Sep  9 2020 10:13AM                < end of copied text >        < from: CT Abdomen and Pelvis No Cont (09.09.20 @ 09:52) >    EXAM:  CT ABDOMEN AND PELVIS                            PROCEDURE DATE:  09/09/2020            INTERPRETATION:  CLINICAL INFORMATION: Abdominal distention/vomiting. Pelvic mass.    COMPARISON: CT abdomen and pelvis 9/4/2020. Ultrasound pelvis 9/4/2020.    PROCEDURE:  CT of the Abdomen and Pelvis was performed without intravenous contrast.  Intravenous contrast: None.  Oral contrast: None.  Sagittal and coronal reformats were performed.    FINDINGS:    Evaluation of the parenchymal organs and vascular structures is limited without intravenous contrast.    LOWER CHEST: Bibasilar dependent consolidations, likely subsegmental atelectasis.    LIVER: Within normal limits.  BILE DUCTS: Normal caliber.  GALLBLADDER: Vicarious excretion of contrast from prior study.  SPLEEN: Within normal limits.  PANCREAS: Within normal limits.  ADRENALS: Within normal limits.  KIDNEYS/URETERS: Absent left kidney. Normal right kidney.    BLADDER: Within normal limits.  REPRODUCTIVE ORGANS: Redemonstrated multiple cystic and solid pelvic masses, highly suspicious for malignancy.    BOWEL: Since prior study, there is focal dilatation of a proximal jejunal loop with gradual transition to underdistended small bowel in the mid abdomen (series 3, image 107). Upstream duodenum and stomach is nondistended.  PERITONEUM: Small volume abdominopelvic ascites, increased from trace to small on 09/04/2020 with persistent infiltration of the omental fat.  VESSELS: Within normal limits.  RETROPERITONEUM/LYMPH NODES: No lymphadenopathy.  ABDOMINAL WALL: A tiny fat-containing umbilical hernia.  BONES: Degenerative changes.    IMPRESSION:  Limited noncontrast study.    Redemonstrated multiple cystic and solid pelvic masses, highly suspicious for malignancy. Increased, now small, abdominopelvic ascites with persistent infiltration of the omental fat.    Focal dilatation of a proximal jejunal loop with gradual transition in the mid abdomen, possibly early bowel obstruction. Correlate clinically. No distention of the proximal duodenum or stomach.    Findings were discussed with ZOEY Miller 9/9/2020 11:42 AM by Dr. Benton with repeat back confirmation.              PRADIP SERVIN M.D., RADIOLOGY RESIDENT  This document has been electronically signed.  ANN MARIE BENTNO M.D., ATTENDING RADIOLOGIST  This document has been electronically signed. Sep  9 2020 11:43AM                < end of copied text >    < from: Xray Chest 1 View- PORTABLE-Urgent (09.03.20 @ 23:29) >    EXAM:  XR CHEST PORTABLE URGENT 1V                            PROCEDURE DATE:  09/03/2020            INTERPRETATION:  CLINICAL INFORMATION: Abdominal distention and pain. Evaluate for free air.    EXAM: Frontal upright radiograph of the chest.    COMPARISON: None.    FINDINGS:    Lines and Tubes: None.    Lungs: Clear lungs.    Pleura: No pleural effusion.    Mediastinum: Heart size cannot be assessed on this view.    Skeletal: Degenerative changes.    Upper abdomen: No free air.      IMPRESSION:    No free air.              MARIYA TOLLIVER M.D., RADIOLOGY RESIDENT  This document has been electronically signed.  KIM HERNANDEZ M.D., ATTENDING RADIOLOGIST  This document has been electronically signed. Sep  4 2020  9:00AM                < end of copied text >    OTHER: 	  	  LABS:	 	    CARDIAC MARKERS:        Serum Pro-Brain Natriuretic Peptide (09.08.20 @ 01:24)    Serum Pro-Brain Natriuretic Peptide: 61 pg/mL                              12.0   15.86 )-----------( 443      ( 09 Sep 2020 06:42 )             38.0     09-09    139  |  98  |  17  ----------------------------<  128<H>  4.2   |  23  |  0.59    Ca    9.2      09 Sep 2020 06:42    TPro  6.7  /  Alb  3.3  /  TBili  0.4  /  DBili  x   /  AST  14  /  ALT  10  /  AlkPhos  88  09-09    proBNP:   Lipid Profile:   HgA1c:   TSH:

## 2020-09-09 NOTE — PROGRESS NOTE ADULT - SUBJECTIVE AND OBJECTIVE BOX
Patient is a 45y old  Female who presents with a chief complaint of abdominal pain x several days (08 Sep 2020 17:08)      INTERVAL History of Present Illness/OVERNIGHT EVENTS: feels a lot better since yesterday. No evidence of VTE though d-dimer elevated.  COVID negative.  Breathing improved.  Can lie flat without SOB.  TTE and cardiology eval pending.  Last BM Sunday.  Poor oral intake    MEDICATIONS  (STANDING):  dextrose 5% + lactated ringers. 1000 milliLiter(s) (75 mL/Hr) IV Continuous <Continuous>  enoxaparin Injectable 40 milliGRAM(s) SubCutaneous daily  FLUoxetine 40 milliGRAM(s) Oral daily  influenza   Vaccine 0.5 milliLiter(s) IntraMuscular once  risperiDONE   Tablet 1 milliGRAM(s) Oral daily    MEDICATIONS  (PRN):  acetaminophen   Tablet .. 650 milliGRAM(s) Oral every 4 hours PRN Mild Pain (1 - 3)  morphine  - Injectable 4 milliGRAM(s) IV Push every 4 hours PRN Severe Pain (7 - 10)  ondansetron Injectable 4 milliGRAM(s) IV Push every 6 hours PRN Nausea      Allergies    ibuprofen (Other)    Intolerances        REVIEW OF SYSTEMS: denies anxiety  Negative unless otherwise specified above.    Vital Signs Last 24 Hrs  T(C): 36.5 (09 Sep 2020 04:03), Max: 37.5 (08 Sep 2020 15:45)  T(F): 97.7 (09 Sep 2020 04:03), Max: 99.5 (08 Sep 2020 15:45)  HR: 112 (09 Sep 2020 07:45) (112 - 120)  BP: 137/85 (09 Sep 2020 04:03) (134/90 - 137/85)  BP(mean): --  RR: 18 (09 Sep 2020 04:03) (18 - 18)  SpO2: 94% (09 Sep 2020 04:03) (94% - 95%)        PHYSICAL EXAM:  GENERAL: No apparent distress, appears comfortable  HEAD:  Atraumatic, Normocephalic  EYES: Conjunctiva and sclera clear, no discharge  ENMT: Moist mucous membranes, no nasal discharge  NECK: Supple, no JVD  CHEST/LUNG: Decreased BS at bases  HEART: Tachycardia, no rubs or gallops  ABDOMEN: Soft, chronic distension, no rebound ttp  EXTREMITIES:  No clubbing, cyanosis or edema  SKIN: No rash or new discoloration  NERVOUS SYSTEM:  Alert & Oriented; Bilateral Lower extremity mobile, sensation to light touch intact      LABS:                        12.0   15.86 )-----------( 443      ( 09 Sep 2020 06:42 )             38.0     09 Sep 2020 06:42    139    |  98     |  17     ----------------------------<  128    4.2     |  23     |  0.59     Ca    9.2        09 Sep 2020 06:42    TPro  6.7    /  Alb  3.3    /  TBili  0.4    /  DBili  x      /  AST  14     /  ALT  10     /  AlkPhos  88     09 Sep 2020 06:42    PT/INR - ( 08 Sep 2020 01:24 )   PT: 12.7 sec;   INR: 1.07 ratio         PTT - ( 08 Sep 2020 01:24 )  PTT:27.8 sec  Urinalysis Basic - ( 08 Sep 2020 04:33 )    Color: Yellow / Appearance: Clear / SG: >1.050 / pH: x  Gluc: x / Ketone: Large  / Bili: Negative / Urobili: Negative   Blood: x / Protein: 100 / Nitrite: Negative   Leuk Esterase: Negative / RBC: 93 /hpf / WBC 5 /HPF   Sq Epi: x / Non Sq Epi: 3 /hpf / Bacteria: Few      CAPILLARY BLOOD GLUCOSE      POCT Blood Glucose.: 135 mg/dL (09 Sep 2020 06:10)      RADIOLOGY & ADDITIONAL TESTS:    Images reviewed personally    Consultant Notes Reviewed and Care Discussed with relevant Consultants/Other Providers.

## 2020-09-09 NOTE — CONSULT NOTE ADULT - SUBJECTIVE AND OBJECTIVE BOX
Patient is a 45y old  Female who presents with a chief complaint of abdominal pain x several days (09 Sep 2020 15:13)      HPI:  Patient is a 45 year old female w/pmh Paranoid Schizophrenia, anxiety/ depression , recently diagnosed pelvic mass,  presents for persistent abdominal pain. Patient reports diffuse abdominal pain , associated with nausea and one episode of vomiting . She reports some response to tylenol and oxycodone. She reports no chest pain , some shortness of breath mostly when laying flat,  she reports minimal lower extremity edema , no calf pain.   She has no fever or chills , no cough , no diarrhea , no dysuria. She reports decreased appetite and decreased po intake.   Patient was seen in the emergency room three days prior to admission for similar pain , imaging at that time revealed a  Large complex multiseptated pelvic mass  , she was evaluated by GYN and planned for further work up as outpatient (08 Sep 2020 05:47)      REVIEW OF SYSTEMS:    CONSTITUTIONAL: No fever, weight loss, or fatigue  EYES: No eye pain, visual disturbances, or discharge  ENMT:  No sore throat  NECK: No pain or stiffness  RESPIRATORY: No cough, wheezing, chills or hemoptysis; No shortness of breath  CARDIOVASCULAR: No chest pain, palpitations, dizziness, or leg swelling  GASTROINTESTINAL: No abdominal or epigastric pain. No nausea, vomiting, or hematemesis; No diarrhea or constipation. No melena or hematochezia.  GENITOURINARY: No dysuria, frequency, hematuria, or incontinence  NEUROLOGICAL: No headaches, memory loss, loss of strength, numbness, or tremors  SKIN: No itching, burning, rashes, or lesions   LYMPH NODES: No enlarged glands  MUSCULOSKELETAL: No joint pain or swelling; No muscle, back, or extremity pain      PAST MEDICAL & SURGICAL HISTORY:  H/O pulmonary valve stenosis  Heart murmur  Anxiety  Paranoid schizophrenia  No significant past surgical history      Allergies    ibuprofen (Other)    Intolerances        FAMILY HISTORY:  FH: skin cancer  FH: diabetes mellitus      SOCIAL HISTORY:        MEDICATIONS  (STANDING):  dextrose 5% + lactated ringers. 1000 milliLiter(s) (75 mL/Hr) IV Continuous <Continuous>  enoxaparin Injectable 40 milliGRAM(s) SubCutaneous daily  FLUoxetine 40 milliGRAM(s) Oral daily  influenza   Vaccine 0.5 milliLiter(s) IntraMuscular once  risperiDONE   Tablet 1 milliGRAM(s) Oral daily    MEDICATIONS  (PRN):  acetaminophen   Tablet .. 650 milliGRAM(s) Oral every 4 hours PRN Mild Pain (1 - 3)  morphine  - Injectable 4 milliGRAM(s) IV Push every 4 hours PRN Severe Pain (7 - 10)  ondansetron Injectable 4 milliGRAM(s) IV Push every 6 hours PRN Nausea      Vital Signs Last 24 Hrs  T(C): 36.5 (09 Sep 2020 04:03), Max: 37.5 (08 Sep 2020 15:45)  T(F): 97.7 (09 Sep 2020 04:03), Max: 99.5 (08 Sep 2020 15:45)  HR: 112 (09 Sep 2020 07:45) (112 - 120)  BP: 137/85 (09 Sep 2020 04:03) (134/90 - 137/85)  BP(mean): --  RR: 18 (09 Sep 2020 04:03) (18 - 18)  SpO2: 94% (09 Sep 2020 04:03) (94% - 95%)    PHYSICAL EXAM:    GENERAL: NAD, well-groomed  HEAD:  Atraumatic, Normocephalic  EYES: EOMI, PERRLA, conjunctiva and sclera clear  ENMT: No tonsillar erythema, exudates, or enlargement; Moist mucous membranes  NECK: Supple, No JVD  CHEST/LUNG: Clear to percussion bilaterally; No rales, rhonchi, wheezing, or rubs  HEART: Regular rate and rhythm; No murmurs, rubs, or gallops  ABDOMEN: Soft, Nontender, Nondistended; Bowel sounds present  EXTREMITIES:  2+ Peripheral Pulses, No clubbing, cyanosis, or edema  LYMPH: No lymphadenopathy noted  SKIN: No rashes or lesions    LABS:  CBC Full  -  ( 09 Sep 2020 06:42 )  WBC Count : 15.86 K/uL  RBC Count : 4.19 M/uL  Hemoglobin : 12.0 g/dL  Hematocrit : 38.0 %  Platelet Count - Automated : 443 K/uL  Mean Cell Volume : 90.7 fl  Mean Cell Hemoglobin : 28.6 pg  Mean Cell Hemoglobin Concentration : 31.6 gm/dL  Auto Neutrophil # : 14.09 K/uL  Auto Lymphocyte # : 0.52 K/uL  Auto Monocyte # : 0.83 K/uL  Auto Eosinophil # : 0.01 K/uL  Auto Basophil # : 0.03 K/uL  Auto Neutrophil % : 88.8 %  Auto Lymphocyte % : 3.3 %  Auto Monocyte % : 5.2 %  Auto Eosinophil % : 0.1 %  Auto Basophil % : 0.2 %      09-09    139  |  98  |  17  ----------------------------<  128<H>  4.2   |  23  |  0.59    Ca    9.2      09 Sep 2020 06:42    TPro  6.7  /  Alb  3.3  /  TBili  0.4  /  DBili  x   /  AST  14  /  ALT  10  /  AlkPhos  88  09-09      LIVER FUNCTIONS - ( 09 Sep 2020 06:42 )  Alb: 3.3 g/dL / Pro: 6.7 g/dL / ALK PHOS: 88 U/L / ALT: 10 U/L / AST: 14 U/L / GGT: x                               MICROBIOLOGY:        Urinalysis Basic - ( 08 Sep 2020 04:33 )    Color: Yellow / Appearance: Clear / SG: >1.050 / pH: x  Gluc: x / Ketone: Large  / Bili: Negative / Urobili: Negative   Blood: x / Protein: 100 / Nitrite: Negative   Leuk Esterase: Negative / RBC: 93 /hpf / WBC 5 /HPF   Sq Epi: x / Non Sq Epi: 3 /hpf / Bacteria: Few                RADIOLOGY: Patient is a 45y old  Female who presents with a chief complaint of abdominal pain x several days (09 Sep 2020 15:13)      HPI:    Patient is a 45 year old female w/pmh Paranoid Schizophrenia, anxiety/ depression , recently diagnosed pelvic mass,  presents for persistent abdominal pain. Patient reports diffuse abdominal pain , associated with nausea and one episode of vomiting.  She reports some response to tylenol and oxycodone. She reports no chest pain , some shortness of breath mostly when laying flat,  she reports minimal lower extremity edema , no calf pain.   She has no fever or chills , no cough , no diarrhea , no dysuria. She reports decreased appetite and decreased po intake.   Patient was seen in the emergency room three days prior to admission for similar pain , imaging at that time revealed a  Large complex multiseptated pelvic mass  , she was evaluated by GYN and planned for further work up as outpatient (08 Sep 2020 05:47)    HOSP COURSE:    Pt with recently diagnosed pelvic mass, a/w persistent abdominal pain, tachycardic , tachypneic and labs w/ leukocytosis.  Tachycardia thought to be multi factorial including pain , anxiety  and dehydration.   No PE seen on CTA elevated ddimer  could be 2/2 to malignancy.  Pt on pain control.  CT c/a/p no evidence for infectious focus.  No localizing signs.  UA neg, Covid pcr (-).  No evidence for pna.      ID consulted for evaluation of leukocytosis.            REVIEW OF SYSTEMS:    CONSTITUTIONAL: No fever, weight loss, or fatigue  EYES: No eye pain, visual disturbances, or discharge  ENMT:  No sore throat  NECK: No pain or stiffness  RESPIRATORY: No cough, wheezing, chills or hemoptysis; No shortness of breath  CARDIOVASCULAR: No chest pain, palpitations, dizziness, or leg swelling  GASTROINTESTINAL: No abdominal or epigastric pain. No nausea, vomiting, or hematemesis; No diarrhea or constipation. No melena or hematochezia.  GENITOURINARY: No dysuria, frequency, hematuria, or incontinence  NEUROLOGICAL: No headaches, memory loss, loss of strength, numbness, or tremors  SKIN: No itching, burning, rashes, or lesions   LYMPH NODES: No enlarged glands  MUSCULOSKELETAL: No joint pain or swelling; No muscle, back, or extremity pain      PAST MEDICAL & SURGICAL HISTORY:  H/O pulmonary valve stenosis  Heart murmur  Anxiety  Paranoid schizophrenia  No significant past surgical history      Allergies    ibuprofen (Other)    Intolerances        FAMILY HISTORY:  FH: skin cancer  FH: diabetes mellitus      SOCIAL HISTORY:  Marital Status:  (   )    (  x ) Single    (   )    (  )   	Occupation: part time legal worker   	Lives with: (  ) alone  (  ) children   (  ) spouse   ( x ) parents  (  ) other    	Substance Use (street drugs): (x  ) never used  (  ) other:  	Tobacco Usage:  (x   ) never smoked   (   ) former smoker   (   ) current smoker  (     ) pack year  (        ) last cigarette date  Alcohol Usage: no etoh use      MEDICATIONS  (STANDING):  dextrose 5% + lactated ringers. 1000 milliLiter(s) (75 mL/Hr) IV Continuous <Continuous>  enoxaparin Injectable 40 milliGRAM(s) SubCutaneous daily  FLUoxetine 40 milliGRAM(s) Oral daily  influenza   Vaccine 0.5 milliLiter(s) IntraMuscular once  risperiDONE   Tablet 1 milliGRAM(s) Oral daily    MEDICATIONS  (PRN):  acetaminophen   Tablet .. 650 milliGRAM(s) Oral every 4 hours PRN Mild Pain (1 - 3)  morphine  - Injectable 4 milliGRAM(s) IV Push every 4 hours PRN Severe Pain (7 - 10)  ondansetron Injectable 4 milliGRAM(s) IV Push every 6 hours PRN Nausea      Vital Signs Last 24 Hrs  T(C): 36.5 (09 Sep 2020 04:03), Max: 37.5 (08 Sep 2020 15:45)  T(F): 97.7 (09 Sep 2020 04:03), Max: 99.5 (08 Sep 2020 15:45)  HR: 112 (09 Sep 2020 07:45) (112 - 120)  BP: 137/85 (09 Sep 2020 04:03) (134/90 - 137/85)  BP(mean): --  RR: 18 (09 Sep 2020 04:03) (18 - 18)  SpO2: 94% (09 Sep 2020 04:03) (94% - 95%)    PHYSICAL EXAM:    GENERAL: NAD, well-groomed  HEAD:  Atraumatic, Normocephalic  EYES: EOMI, PERRLA, conjunctiva and sclera clear  ENMT: No tonsillar erythema, exudates, or enlargement; Moist mucous membranes  NECK: Supple, No JVD  CHEST/LUNG: Clear to percussion bilaterally; No rales, rhonchi, wheezing, or rubs  HEART: Regular rate and rhythm; No murmurs, rubs, or gallops  ABDOMEN: Soft, Nontender, Nondistended; Bowel sounds present  EXTREMITIES:  2+ Peripheral Pulses, No clubbing, cyanosis, or edema  LYMPH: No lymphadenopathy noted  SKIN: No rashes or lesions    LABS:  CBC Full  -  ( 09 Sep 2020 06:42 )  WBC Count : 15.86 K/uL  RBC Count : 4.19 M/uL  Hemoglobin : 12.0 g/dL  Hematocrit : 38.0 %  Platelet Count - Automated : 443 K/uL  Mean Cell Volume : 90.7 fl  Mean Cell Hemoglobin : 28.6 pg  Mean Cell Hemoglobin Concentration : 31.6 gm/dL  Auto Neutrophil # : 14.09 K/uL  Auto Lymphocyte # : 0.52 K/uL  Auto Monocyte # : 0.83 K/uL  Auto Eosinophil # : 0.01 K/uL  Auto Basophil # : 0.03 K/uL  Auto Neutrophil % : 88.8 %  Auto Lymphocyte % : 3.3 %  Auto Monocyte % : 5.2 %  Auto Eosinophil % : 0.1 %  Auto Basophil % : 0.2 %      09-09    139  |  98  |  17  ----------------------------<  128<H>  4.2   |  23  |  0.59    Ca    9.2      09 Sep 2020 06:42    TPro  6.7  /  Alb  3.3  /  TBili  0.4  /  DBili  x   /  AST  14  /  ALT  10  /  AlkPhos  88  09-09      LIVER FUNCTIONS - ( 09 Sep 2020 06:42 )  Alb: 3.3 g/dL / Pro: 6.7 g/dL / ALK PHOS: 88 U/L / ALT: 10 U/L / AST: 14 U/L / GGT: x                               MICROBIOLOGY:        Urinalysis Basic - ( 08 Sep 2020 04:33 )    Color: Yellow / Appearance: Clear / SG: >1.050 / pH: x  Gluc: x / Ketone: Large  / Bili: Negative / Urobili: Negative   Blood: x / Protein: 100 / Nitrite: Negative   Leuk Esterase: Negative / RBC: 93 /hpf / WBC 5 /HPF   Sq Epi: x / Non Sq Epi: 3 /hpf / Bacteria: Few      Culture - Urine (09.04.20 @ 09:01)    Specimen Source: .Urine Clean Catch (Midstream)    Culture Results:   No growth      COVID-19 PCR . (09.08.20 @ 04:19)    COVID-19 PCR: NotDetec: Testing is performed using polymerase chain reaction (PCR) or  transcription mediated amplification (TMA). This COVID-19 (SARS-CoV-2)  nucleic acid amplification test was validated by [employer] and is  in use under the FDA Emergency Use Authorization (EUA) for clinical labs  CLIA-certified to perform high complexity testing. Test results should be  correlated with clinical presentation, patient history, and epidemiology.              RADIOLOGY:      < from: CT Abdomen and Pelvis No Cont (09.09.20 @ 09:52) >  EXAM:  CT ABDOMEN AND PELVIS                            PROCEDURE DATE:  09/09/2020            INTERPRETATION:  CLINICAL INFORMATION: Abdominal distention/vomiting. Pelvic mass.    COMPARISON: CT abdomen and pelvis 9/4/2020. Ultrasound pelvis 9/4/2020.    PROCEDURE:  CT of the Abdomen and Pelvis was performed without intravenous contrast.  Intravenous contrast: None.  Oral contrast: None.  Sagittal and coronal reformats were performed.    FINDINGS:    Evaluation of the parenchymal organs and vascular structures is limited without intravenous contrast.    LOWER CHEST: Bibasilar dependent consolidations, likely subsegmental atelectasis.    LIVER: Within normal limits.  BILE DUCTS: Normal caliber.  GALLBLADDER: Vicarious excretion of contrast from prior study.  SPLEEN: Within normal limits.  PANCREAS: Within normal limits.  ADRENALS: Within normal limits.  KIDNEYS/URETERS: Absent left kidney. Normal right kidney.    BLADDER: Within normal limits.  REPRODUCTIVE ORGANS: Redemonstrated multiple cystic and solid pelvic masses, highly suspicious for malignancy.    BOWEL: Since prior study, there is focal dilatation of a proximal jejunal loop with gradual transition to underdistended small bowel in the mid abdomen (series 3, image 107). Upstream duodenum and stomach is nondistended.  PERITONEUM: Small volume abdominopelvic ascites, increased from trace to small on 09/04/2020 with persistent infiltration of the omental fat.  VESSELS: Within normal limits.  RETROPERITONEUM/LYMPH NODES: No lymphadenopathy.  ABDOMINAL WALL: A tiny fat-containing umbilical hernia.  BONES: Degenerative changes.    IMPRESSION:  Limited noncontrast study.    Redemonstrated multiple cystic and solid pelvic masses, highly suspicious for malignancy. Increased, now small, abdominopelvic ascites with persistent infiltration of the omental fat.    Focal dilatation of a proximal jejunal loop with gradual transition in the mid abdomen, possibly early bowel obstruction. Correlate clinically. No distention of the proximal duodenum or stomach.    < end of copied text >      < from: [facility] Duplex Lower Ext Vein Scan, Bilat (09.08.20 @ 18:57) >  TECHNIQUE: Duplex sonography of the BILATERAL LOWER extremity veins with color and spectral Doppler, with and without compression.    FINDINGS: There are bilateral Baker cysts.    There is normal compressibility of the bilateral common femoral, femoral and popliteal veins.  Doppler examination shows normal spontaneous and phasic flow.    No calf vein thrombosis is detected.    IMPRESSION:  No evidence of deep venous thrombosis in either lower extremity.    < end of copied text >        < from: US Pelvis Complete (09.08.20 @ 15:02) >  INTERPRETATION:  CLINICAL INFORMATION: 45-year-old with history of pelvic mass, seen on prior CT scan with abdominal pain    Limited transabdominal ultrasound examination of the pelvis was performed and compared with prior CT scan from 9/4/2020. Patient declined endovaginal sonography.    As noted on prior study, a complex, predominantly cystic mass is seen in the left adnexal region, measuring 15 x 10.6x 16.1 cm. The mass demonstrates thick septations. No definite internal vascularity is seen. A solid area, measuring 6.4 x 4.8 cm is seen adjacent to the mass which may represent a leiomyoma as noted on prior CT scan.    The uterus and right adnexa were not visualized on this study    Impression: Limited study. Complex, predominantly cystic septated left adnexal mass measuring 16 cm in greatest dimension. Findings are concerning for ovarian lesion. Correlation with pelvic MRI is recommended for further evaluation.    < end of copied text >        < from: Xray Chest 1 View- PORTABLE-Urgent (09.03.20 @ 23:29) >  FINDINGS:    Lines and Tubes: None.    Lungs: Clear lungs.    Pleura: No pleural effusion.    Mediastinum: Heart size cannot be assessed on this view.    Skeletal: Degenerative changes.    Upper abdomen: No free air.      IMPRESSION:    No free air.    < end of copied text > Patient is a 45y old  Female who presents with a chief complaint of abdominal pain x several days (09 Sep 2020 15:13)      HPI:    Patient is a 45 year old female w/pmh Paranoid Schizophrenia, anxiety/ depression , recently diagnosed pelvic mass,  presents for persistent abdominal pain. Patient reports diffuse abdominal pain , associated with nausea and one episode of vomiting.  She reports some response to tylenol and oxycodone. She reports no chest pain , some shortness of breath mostly when laying flat,  she reports minimal lower extremity edema , no calf pain.   She has no fever or chills , no cough , no diarrhea , no dysuria. She reports decreased appetite and decreased po intake.   Patient was seen in the emergency room three days prior to admission for similar pain , imaging at that time revealed a  Large complex multiseptated pelvic mass  , she was evaluated by GYN and planned for further work up as outpatient (08 Sep 2020 05:47)    HOSP COURSE:    Pt with recently diagnosed pelvic mass, a/w persistent abdominal pain, tachycardic , tachypneic and labs w/ leukocytosis.  Tachycardia thought to be multi factorial including pain , anxiety  and dehydration.   No PE seen on CTA elevated ddimer  could be 2/2 to malignancy.  Pt on pain control.  CT c/a/p no evidence for infectious focus.  No localizing signs.  UA neg, Covid pcr (-).  No evidence for pna.      ID consulted for evaluation of leukocytosis.              REVIEW OF SYSTEMS:    CONSTITUTIONAL: Fatigue, feels thirsty  EYES: No eye pain, visual disturbances, or discharge  ENMT:  No sore throat  NECK: No pain or stiffness  RESPIRATORY: No cough, wheezing, chills or hemoptysis; No shortness of breath  CARDIOVASCULAR: No chest pain, palpitations, dizziness, or leg swelling  GASTROINTESTINAL:  No nausea, vomiting, or hematemesis; No diarrhea or constipation. No melena or hematochezia.  States pain is 2/10 currently.    GENITOURINARY: No dysuria, frequency, hematuria, or incontinence  NEUROLOGICAL: No headaches, memory loss, loss of strength, numbness, or tremors  SKIN: No itching, burning, rashes, or lesions   LYMPH NODES: No enlarged glands  MUSCULOSKELETAL: No joint pain or swelling; No muscle, back, or extremity pain      PAST MEDICAL & SURGICAL HISTORY:  H/O pulmonary valve stenosis  Heart murmur  Anxiety  Paranoid schizophrenia  No significant past surgical history      Allergies    ibuprofen (Other)    Intolerances        FAMILY HISTORY:  FH: skin cancer  FH: diabetes mellitus      SOCIAL HISTORY:  Marital Status:  (   )    (  x ) Single    (   )    (  )   	Occupation: part time legal worker   	Lives with: (  ) alone  (  ) children   (  ) spouse   ( x ) parents  (  ) other    	Substance Use (street drugs): (x  ) never used  (  ) other:  	Tobacco Usage:  (x   ) never smoked   (   ) former smoker   (   ) current smoker  (     ) pack year  (        ) last cigarette date  Alcohol Usage: no etoh use      MEDICATIONS  (STANDING):  dextrose 5% + lactated ringers. 1000 milliLiter(s) (75 mL/Hr) IV Continuous <Continuous>  enoxaparin Injectable 40 milliGRAM(s) SubCutaneous daily  FLUoxetine 40 milliGRAM(s) Oral daily  influenza   Vaccine 0.5 milliLiter(s) IntraMuscular once  risperiDONE   Tablet 1 milliGRAM(s) Oral daily    MEDICATIONS  (PRN):  acetaminophen   Tablet .. 650 milliGRAM(s) Oral every 4 hours PRN Mild Pain (1 - 3)  morphine  - Injectable 4 milliGRAM(s) IV Push every 4 hours PRN Severe Pain (7 - 10)  ondansetron Injectable 4 milliGRAM(s) IV Push every 6 hours PRN Nausea      Vital Signs Last 24 Hrs  T(C): 36.5 (09 Sep 2020 04:03), Max: 37.5 (08 Sep 2020 15:45)  T(F): 97.7 (09 Sep 2020 04:03), Max: 99.5 (08 Sep 2020 15:45)  HR: 112 (09 Sep 2020 07:45) (112 - 120)  BP: 137/85 (09 Sep 2020 04:03) (134/90 - 137/85)  BP(mean): --  RR: 18 (09 Sep 2020 04:03) (18 - 18)  SpO2: 94% (09 Sep 2020 04:03) (94% - 95%)    PHYSICAL EXAM:    GENERAL: NAD, appears fatigued  HEAD:  Atraumatic, Normocephalic  EYES: EOMI, PERRLA, conjunctiva and sclera clear  ENMT: No tonsillar erythema, exudates, or enlargement; Moist mucous membranes  NECK: Supple, No JVD  CHEST/LUNG: Clear to percussion bilaterally; No rales, rhonchi, wheezing, or rubs  HEART: Regular rate and rhythm; No murmurs, rubs, or gallops  ABDOMEN: Distended, (+) TTP in lower abd.   EXTREMITIES:  2+ Peripheral Pulses, No clubbing, cyanosis, or edema  LYMPH: No lymphadenopathy noted  SKIN: No rashes or lesions    LABS:  CBC Full  -  ( 09 Sep 2020 06:42 )  WBC Count : 15.86 K/uL  RBC Count : 4.19 M/uL  Hemoglobin : 12.0 g/dL  Hematocrit : 38.0 %  Platelet Count - Automated : 443 K/uL  Mean Cell Volume : 90.7 fl  Mean Cell Hemoglobin : 28.6 pg  Mean Cell Hemoglobin Concentration : 31.6 gm/dL  Auto Neutrophil # : 14.09 K/uL  Auto Lymphocyte # : 0.52 K/uL  Auto Monocyte # : 0.83 K/uL  Auto Eosinophil # : 0.01 K/uL  Auto Basophil # : 0.03 K/uL  Auto Neutrophil % : 88.8 %  Auto Lymphocyte % : 3.3 %  Auto Monocyte % : 5.2 %  Auto Eosinophil % : 0.1 %  Auto Basophil % : 0.2 %      09-09    139  |  98  |  17  ----------------------------<  128<H>  4.2   |  23  |  0.59    Ca    9.2      09 Sep 2020 06:42    TPro  6.7  /  Alb  3.3  /  TBili  0.4  /  DBili  x   /  AST  14  /  ALT  10  /  AlkPhos  88  09-09      LIVER FUNCTIONS - ( 09 Sep 2020 06:42 )  Alb: 3.3 g/dL / Pro: 6.7 g/dL / ALK PHOS: 88 U/L / ALT: 10 U/L / AST: 14 U/L / GGT: x                               MICROBIOLOGY:        Urinalysis Basic - ( 08 Sep 2020 04:33 )    Color: Yellow / Appearance: Clear / SG: >1.050 / pH: x  Gluc: x / Ketone: Large  / Bili: Negative / Urobili: Negative   Blood: x / Protein: 100 / Nitrite: Negative   Leuk Esterase: Negative / RBC: 93 /hpf / WBC 5 /HPF   Sq Epi: x / Non Sq Epi: 3 /hpf / Bacteria: Few      Culture - Urine (09.04.20 @ 09:01)    Specimen Source: .Urine Clean Catch (Midstream)    Culture Results:   No growth      COVID-19 PCR . (09.08.20 @ 04:19)    COVID-19 PCR: NotDetec: Testing is performed using polymerase chain reaction (PCR) or  transcription mediated amplification (TMA). This COVID-19 (SARS-CoV-2)  nucleic acid amplification test was validated by [employer] and is  in use under the FDA Emergency Use Authorization (EUA) for clinical labs  CLIA-certified to perform high complexity testing. Test results should be  correlated with clinical presentation, patient history, and epidemiology.              RADIOLOGY:      < from: CT Abdomen and Pelvis No Cont (09.09.20 @ 09:52) >  EXAM:  CT ABDOMEN AND PELVIS                            PROCEDURE DATE:  09/09/2020            INTERPRETATION:  CLINICAL INFORMATION: Abdominal distention/vomiting. Pelvic mass.    COMPARISON: CT abdomen and pelvis 9/4/2020. Ultrasound pelvis 9/4/2020.    PROCEDURE:  CT of the Abdomen and Pelvis was performed without intravenous contrast.  Intravenous contrast: None.  Oral contrast: None.  Sagittal and coronal reformats were performed.    FINDINGS:    Evaluation of the parenchymal organs and vascular structures is limited without intravenous contrast.    LOWER CHEST: Bibasilar dependent consolidations, likely subsegmental atelectasis.    LIVER: Within normal limits.  BILE DUCTS: Normal caliber.  GALLBLADDER: Vicarious excretion of contrast from prior study.  SPLEEN: Within normal limits.  PANCREAS: Within normal limits.  ADRENALS: Within normal limits.  KIDNEYS/URETERS: Absent left kidney. Normal right kidney.    BLADDER: Within normal limits.  REPRODUCTIVE ORGANS: Redemonstrated multiple cystic and solid pelvic masses, highly suspicious for malignancy.    BOWEL: Since prior study, there is focal dilatation of a proximal jejunal loop with gradual transition to underdistended small bowel in the mid abdomen (series 3, image 107). Upstream duodenum and stomach is nondistended.  PERITONEUM: Small volume abdominopelvic ascites, increased from trace to small on 09/04/2020 with persistent infiltration of the omental fat.  VESSELS: Within normal limits.  RETROPERITONEUM/LYMPH NODES: No lymphadenopathy.  ABDOMINAL WALL: A tiny fat-containing umbilical hernia.  BONES: Degenerative changes.    IMPRESSION:  Limited noncontrast study.    Redemonstrated multiple cystic and solid pelvic masses, highly suspicious for malignancy. Increased, now small, abdominopelvic ascites with persistent infiltration of the omental fat.    Focal dilatation of a proximal jejunal loop with gradual transition in the mid abdomen, possibly early bowel obstruction. Correlate clinically. No distention of the proximal duodenum or stomach.    < end of copied text >      < from: [facility] Duplex Lower Ext Vein Scan, Bilat (09.08.20 @ 18:57) >  TECHNIQUE: Duplex sonography of the BILATERAL LOWER extremity veins with color and spectral Doppler, with and without compression.    FINDINGS: There are bilateral Baker cysts.    There is normal compressibility of the bilateral common femoral, femoral and popliteal veins.  Doppler examination shows normal spontaneous and phasic flow.    No calf vein thrombosis is detected.    IMPRESSION:  No evidence of deep venous thrombosis in either lower extremity.    < end of copied text >        < from: US Pelvis Complete (09.08.20 @ 15:02) >  INTERPRETATION:  CLINICAL INFORMATION: 45-year-old with history of pelvic mass, seen on prior CT scan with abdominal pain    Limited transabdominal ultrasound examination of the pelvis was performed and compared with prior CT scan from 9/4/2020. Patient declined endovaginal sonography.    As noted on prior study, a complex, predominantly cystic mass is seen in the left adnexal region, measuring 15 x 10.6x 16.1 cm. The mass demonstrates thick septations. No definite internal vascularity is seen. A solid area, measuring 6.4 x 4.8 cm is seen adjacent to the mass which may represent a leiomyoma as noted on prior CT scan.    The uterus and right adnexa were not visualized on this study    Impression: Limited study. Complex, predominantly cystic septated left adnexal mass measuring 16 cm in greatest dimension. Findings are concerning for ovarian lesion. Correlation with pelvic MRI is recommended for further evaluation.    < end of copied text >        < from: Xray Chest 1 View- PORTABLE-Urgent (09.03.20 @ 23:29) >  FINDINGS:    Lines and Tubes: None.    Lungs: Clear lungs.    Pleura: No pleural effusion.    Mediastinum: Heart size cannot be assessed on this view.    Skeletal: Degenerative changes.    Upper abdomen: No free air.      IMPRESSION:    No free air.    < end of copied text >

## 2020-09-09 NOTE — CONSULT NOTE ADULT - SUBJECTIVE AND OBJECTIVE BOX
GENERAL SURGERY CONSULT NOTE  --------------------------------------------------------------------------------------------  HPI:  Patient is a 45 year old female w/pmh Paranoid Schizophrenia, anxiety/ depression, recently diagnosed pelvic mass,  presents for persistent abdominal pain. Patient reports diffuse abdominal pain for a week, associated with nausea and bilious vomiting. Patient was seen in the ED on 9/4 for abdominal pain, where she was recommended to follow up with GYN ONC outpatient for work up. Current workup revealed large solid pelvic mass on CT with elevated / CA 19/9. Patient reports nausea and bilious vomiting and obstipation since Sunday. Reports abdominal bloating and pain and SOB when lying down.      PAST MEDICAL & SURGICAL HISTORY:  H/O pulmonary valve stenosis  Heart murmur  Anxiety  Paranoid schizophrenia  No significant past surgical history    FAMILY HISTORY:  FH: skin cancer  FH: diabetes mellitus  [] Family history not pertinent as reviewed with the patient and family    SOCIAL HISTORY:  ***    ALLERGIES: ibuprofen (Other)      HOME MEDICATIONS: ***    CURRENT MEDICATIONS  MEDICATIONS (STANDING): dextrose 5% + lactated ringers. 1000 milliLiter(s) IV Continuous <Continuous>  enoxaparin Injectable 40 milliGRAM(s) SubCutaneous daily  FLUoxetine 40 milliGRAM(s) Oral daily  influenza   Vaccine 0.5 milliLiter(s) IntraMuscular once  risperiDONE   Tablet 1 milliGRAM(s) Oral daily    MEDICATIONS (PRN):acetaminophen   Tablet .. 650 milliGRAM(s) Oral every 4 hours PRN Mild Pain (1 - 3)  morphine  - Injectable 4 milliGRAM(s) IV Push every 4 hours PRN Severe Pain (7 - 10)  ondansetron Injectable 4 milliGRAM(s) IV Push every 6 hours PRN Nausea    --------------------------------------------------------------------------------------------    Vitals:   T(C): 36.5 (09-09-20 @ 04:03), Max: 37.5 (09-08-20 @ 15:45)  HR: 112 (09-09-20 @ 07:45) (112 - 120)  BP: 137/85 (09-09-20 @ 04:03) (134/90 - 137/85)  RR: 18 (09-09-20 @ 04:03) (18 - 18)  SpO2: 94% (09-09-20 @ 04:03) (94% - 95%)  CAPILLARY BLOOD GLUCOSE      POCT Blood Glucose.: 135 mg/dL (09 Sep 2020 06:10)      09-09 @ 07:01  -  09-09 @ 15:14  --------------------------------------------------------  IN:    Oral Fluid: 240 mL  Total IN: 240 mL    OUT:    Voided: 300 mL  Total OUT: 300 mL    Total NET: -60 mL        Height (cm): 170.18 (09-08 @ 00:03)  Weight (kg): 89.3 (09-08 @ 02:35)  BMI (kg/m2): 30.8 (09-08 @ 02:35)  BSA (m2): 2.01 (09-08 @ 02:35)      PHYSICAL EXAM: ***  General: NAD, Lying in bed comfortably  Neuro: A+Ox3  HEENT: NC/AT, EOMI  Neck: Soft, supple  Cardio: RRR, nml S1/S2  Resp: Good effort, CTA b/l  Thorax: No chest wall tenderness  Breast: No lesions/masses, no drainage  GI/Abd: Soft, NT/ND, no rebound/guarding, no masses palpated  Vascular: All 4 extremities warm.  Skin: Intact, no breakdown  Lymphatic/Nodes: No palpable lymphadenopathy  Musculoskeletal: All 4 extremities moving spontaneously, no limitations  --------------------------------------------------------------------------------------------    LABS  CBC (09-09 @ 06:42)                              12.0                           15.86<H>  )----------------(  443<H>     88.8<H>% Neutrophils, 3.3<L>% Lymphocytes, ANC: 14.09<H>                              38.0    CBC (09-08 @ 06:20)                              12.0                           12.75<H>  )----------------(  413<H>     --    % Neutrophils, --    % Lymphocytes, ANC: --                                  38.4      BMP (09-09 @ 06:42)             139     |  98      |  17    		Ca++ --      Ca 9.2                ---------------------------------( 128<H>		Mg --                 4.2     |  23      |  0.59  			Ph --      BMP (09-08 @ 06:20)             137     |  101     |  16    		Ca++ --      Ca 8.8                ---------------------------------( 118<H>		Mg --                 4.2     |  19<L>   |  0.58  			Ph --        LFTs (09-09 @ 06:42)      TPro 6.7 / Alb 3.3 / TBili 0.4 / DBili -- / AST 14 / ALT 10 / AlkPhos 88            --------------------------------------------------------------------------------------------    MICROBIOLOGY  Urinalysis (09-08 @ 04:33):     Color: Yellow / Appearance: Clear / SG: >1.050<!> / pH: 7.0 / Gluc: Negative / Ketones: Large<!> / Bili: Negative / Urobili: Negative / Protein :100 / Nitrites: Negative / Leuk.Est: Negative / RBC: 93<H> / WBC: 5 / Sq Epi:  / Non Sq Epi: 3 / Bacteria Few<!>       -> .Urine Clean Catch (Midstream) Culture (09-04 @ 09:01)     NG    NG    No growth      --------------------------------------------------------------------------------------------    IMAGING  ***    --------------------------------------------------------------------------------------------    ASSESSMENT: Patient is a 45yf pmhx ***    PLAN:  ***  -   -   -   -   - Patient seen/examined with attending.  - Plan to be discussed with Attending,  GENERAL SURGERY CONSULT NOTE  --------------------------------------------------------------------------------------------  HPI:  Patient is a 45 year old female w/pmh Paranoid Schizophrenia, anxiety/ depression, recently diagnosed pelvic mass,  presents for persistent abdominal pain. Patient reports diffuse abdominal pain for a week, associated with nausea and bilious vomiting. Patient was seen in the ED on 9/4 for abdominal pain, where she was recommended to follow up with GYN ONC outpatient for work up. Current workup revealed large solid pelvic mass on CT with elevated / CA 19/9. Patient reports nausea and bilious vomiting and obstipation since Sunday. Reports abdominal bloating and pain and SOB when lying down.      PAST MEDICAL & SURGICAL HISTORY:  H/O pulmonary valve stenosis  Heart murmur  Anxiety  Paranoid schizophrenia  No significant past surgical history    FAMILY HISTORY:  FH: both parents had skin cancer  FH: diabetes mellitus    SOCIAL HISTORY:    Denies smoking   Denies drug use    ALLERGIES: ibuprofen (angioedema)    HOME MEDICATIONS:   - Prozac   - risperdal    CURRENT MEDICATIONS  MEDICATIONS (STANDING): dextrose 5% + lactated ringers. 1000 milliLiter(s) IV Continuous <Continuous>  enoxaparin Injectable 40 milliGRAM(s) SubCutaneous daily  FLUoxetine 40 milliGRAM(s) Oral daily  influenza   Vaccine 0.5 milliLiter(s) IntraMuscular once  risperiDONE   Tablet 1 milliGRAM(s) Oral daily    MEDICATIONS (PRN):acetaminophen   Tablet .. 650 milliGRAM(s) Oral every 4 hours PRN Mild Pain (1 - 3)  morphine  - Injectable 4 milliGRAM(s) IV Push every 4 hours PRN Severe Pain (7 - 10)  ondansetron Injectable 4 milliGRAM(s) IV Push every 6 hours PRN Nausea    --------------------------------------------------------------------------------------------    Vitals:   T(C): 36.5 (09-09-20 @ 04:03), Max: 37.5 (09-08-20 @ 15:45)  HR: 112 (09-09-20 @ 07:45) (112 - 120)  BP: 137/85 (09-09-20 @ 04:03) (134/90 - 137/85)  RR: 18 (09-09-20 @ 04:03) (18 - 18)  SpO2: 94% (09-09-20 @ 04:03) (94% - 95%)  CAPILLARY BLOOD GLUCOSE      POCT Blood Glucose.: 135 mg/dL (09 Sep 2020 06:10)      09-09 @ 07:01  -  09-09 @ 15:14  --------------------------------------------------------  IN:    Oral Fluid: 240 mL  Total IN: 240 mL    OUT:    Voided: 300 mL  Total OUT: 300 mL    Total NET: -60 mL        Height (cm): 170.18 (09-08 @ 00:03)  Weight (kg): 89.3 (09-08 @ 02:35)  BMI (kg/m2): 30.8 (09-08 @ 02:35)  BSA (m2): 2.01 (09-08 @ 02:35)      PHYSICAL EXAM:  General: NAD, Lying in bed comfortably  Neuro: A+Ox3  HEENT: NC/AT, EOMI  Cardio: Tachycardic   Resp: Good effort  GI/Abd: Soft, distended, mild diffused tenderness, no rebound/guarding, no masses palpated  Vascular: All 4 extremities warm.  Skin: Intact, no breakdown  Musculoskeletal: All 4 extremities moving spontaneously, no limitations  --------------------------------------------------------------------------------------------    LABS  CBC (09-09 @ 06:42)                              12.0                           15.86<H>  )----------------(  443<H>     88.8<H>% Neutrophils, 3.3<L>% Lymphocytes, ANC: 14.09<H>                              38.0    CBC (09-08 @ 06:20)                              12.0                           12.75<H>  )----------------(  413<H>     --    % Neutrophils, --    % Lymphocytes, ANC: --                                  38.4      BMP (09-09 @ 06:42)             139     |  98      |  17    		Ca++ --      Ca 9.2                ---------------------------------( 128<H>		Mg --                 4.2     |  23      |  0.59  			Ph --      BMP (09-08 @ 06:20)             137     |  101     |  16    		Ca++ --      Ca 8.8                ---------------------------------( 118<H>		Mg --                 4.2     |  19<L>   |  0.58  			Ph --        LFTs (09-09 @ 06:42)      TPro 6.7 / Alb 3.3 / TBili 0.4 / DBili -- / AST 14 / ALT 10 / AlkPhos 88            --------------------------------------------------------------------------------------------    MICROBIOLOGY  Urinalysis (09-08 @ 04:33):     Color: Yellow / Appearance: Clear / SG: >1.050<!> / pH: 7.0 / Gluc: Negative / Ketones: Large<!> / Bili: Negative / Urobili: Negative / Protein :100 / Nitrites: Negative / Leuk.Est: Negative / RBC: 93<H> / WBC: 5 / Sq Epi:  / Non Sq Epi: 3 / Bacteria Few<!>       -> .Urine Clean Catch (Midstream) Culture (09-04 @ 09:01)     NG    NG    No growth      --------------------------------------------------------------------------------------------    IMAGING  < from: US Pelvis Complete (09.08.20 @ 15:02) >    Impression: Limited study. Complex, predominantly cystic septated left adnexal mass measuring 16 cm in greatest dimension. Findings are concerning for ovarian lesion. Correlation with pelvic MRI is recommended for further evaluation.    < end of copied text >  < from: [facility] Duplex Lower Ext Vein Scan, Bilat (09.08.20 @ 18:57) >  IMPRESSION:  No evidence of deep venous thrombosis in either lower extremity.    < end of copied text >      --------------------------------------------------------------------------------------------    ASSESSMENT: Patient is a 45yf pmhx ***    < from: CT Abdomen and Pelvis No Cont (09.09.20 @ 09:52) >  IMPRESSION:  Limited noncontrast study.    Redemonstrated multiple cystic and solid pelvic masses, highly suspicious for malignancy. Increased, now small, abdominopelvic ascites with persistent infiltration of the omental fat.    Focal dilatation of a proximal jejunal loop with gradual transition in the mid abdomen, possibly early bowel obstruction. Correlate clinically. No distention of the proximal duodenum or stomach.    Findings were discussed with ZOEY Miller 9/9/2020 11:42 AM by Dr. Virgen with repeat back confirmation.        < end of copied text >

## 2020-09-09 NOTE — CHART NOTE - NSCHARTNOTEFT_GEN_A_CORE
Medicine NP note    CC: Nausea/Vomiting    Notified by RN that patient nauseas and had 2 episodes of  vomiting  Evaluated the patient at bedside. Patient sitting in bed, c/o nausea, noted greenish vomitus in basin, non bloody vomitus  Patient reports mild abdominal discomfort, states that she doesn't  remember whether  she passed flatus. Last BM on last Sunday, denies being constipated.    Vital Signs Last 24 Hrs  T(C): 36.7 (08 Sep 2020 21:33), Max: 37.5 (08 Sep 2020 15:45)  T(F): 98.1 (08 Sep 2020 21:33), Max: 99.5 (08 Sep 2020 15:45)  HR: 120 (08 Sep 2020 21:33) (110 - 130)  BP: 134/90 (08 Sep 2020 21:33) (120/95 - 139/92)  BP(mean): --  RR: 18 (08 Sep 2020 21:33) (16 - 38)  SpO2: 94% (08 Sep 2020 21:33) (91% - 99%)                          12.0   12.75 )-----------( 413      ( 08 Sep 2020 06:20 )             38.4    45 year old female w/pmh Paranoid Schizophrenia, anxiety/ depression , recently diagnosed pelvic mass,  presents for persistent abdominal pain, tachycardic , tachypneic and labs w/ leukocytosis   Patient now with 2-3 episodes of bilious/clear fluid  vomiting  Patient had multiple episodes of vomiting since admission, on Zofran    N/V/Pelvic mass/ Ovarian lesion  Abdomen soft, BS+ in all 4 quadrants  X-ray abdomen with nonspecific bowel pattern with no obstruction as verbally reported by Radiologist, F/U officil report  LR@ 100ML/HR Started for hydration  F/U 4 am electrolytes  Will order cT A/P / call surgery consult/insert NGT to decompress stomach if patient's symptoms worsening  Patient had CT A/p on 08/04 with no bowel obstruction  CTA with no PE  Zofran PRN for N/V  Monitor QTC  Keep pt NPO for now  Will discuss with Hospitalist in charge  Will continue to monitor    Ольга Lomeli [facility] [place]  [id number] Medicine NP note    CC: Nausea/Vomiting    Notified by RN that patient nauseas and had 2 episodes of  vomiting  Evaluated the patient at bedside. Patient sitting in bed, c/o nausea, noted greenish vomitus in basin, non bloody vomitus  Patient reports mild abdominal discomfort, states that she doesn't  remember whether  she passed flatus. Last BM on last Sunday, denies being constipated.    Vital Signs Last 24 Hrs  T(C): 36.7 (08 Sep 2020 21:33), Max: 37.5 (08 Sep 2020 15:45)  T(F): 98.1 (08 Sep 2020 21:33), Max: 99.5 (08 Sep 2020 15:45)  HR: 120 (08 Sep 2020 21:33) (110 - 130)  BP: 134/90 (08 Sep 2020 21:33) (120/95 - 139/92)  BP(mean): --  RR: 18 (08 Sep 2020 21:33) (16 - 38)  SpO2: 94% (08 Sep 2020 21:33) (91% - 99%)                          12.0   12.75 )-----------( 413      ( 08 Sep 2020 06:20 )             38.4     Physical exam    GENERAL: No acute distress, appears fatigued  CHEST/LUNG: Clear to auscultation bilaterally; No wheeze, equal breath sounds bilaterally   CVS: Regular rhythm tachycardic ; soft click , No murmurs, rubs, or gallops  ABDOMEN: Soft, mild tenderness LQ,  Nondistended; Bowel sounds present  EXTREMITIES:  No clubbing, cyanosis, trace edema  PSYCH:  anxious appearing , flat affect  NEUROLOGY: AAOx3, non-focal       45 year old female w/pmh Paranoid Schizophrenia, anxiety/ depression , recently diagnosed pelvic mass,  presents for persistent abdominal pain, tachycardic , tachypneic and labs w/ leukocytosis   Patient now with 2-3 episodes of bilious/clear fluid  vomiting  Patient had multiple episodes of vomiting since admission, on Zofran    N/V/Pelvic mass/ Ovarian lesion  Abdomen soft, BS+ in all 4 quadrants  X-ray abdomen with nonspecific bowel pattern with no obstruction as verbally reported by Radiologist, F/U officil report  LR@ 100ML/HR Started for hydration  F/U 4 am electrolytes  Will order cT A/P / call surgery consult/insert NGT to decompress stomach if patient's symptoms worsening  Patient had CT A/p on 08/04 with no bowel obstruction  CTA with no PE  Zofran PRN for N/V  Monitor QTC  Keep pt NPO for now  Will discuss with Hospitalist in charge  Will continue to monitor    Ольга Lomeli [facility] [place]  [id number] Medicine NP note    CC: Nausea/Vomiting    Notified by RN that patient nauseas and had 2 episodes of  vomiting  Evaluated the patient at bedside. Patient sitting in bed, c/o nausea, noted greenish vomitus in basin, non bloody vomitus  Patient reports mild abdominal discomfort, states that she doesn't  remember whether  she passed flatus. Last BM on last Sunday, denies being constipated.    Vital Signs Last 24 Hrs  T(C): 36.7 (08 Sep 2020 21:33), Max: 37.5 (08 Sep 2020 15:45)  T(F): 98.1 (08 Sep 2020 21:33), Max: 99.5 (08 Sep 2020 15:45)  HR: 120 (08 Sep 2020 21:33) (110 - 130)  BP: 134/90 (08 Sep 2020 21:33) (120/95 - 139/92)  BP(mean): --  RR: 18 (08 Sep 2020 21:33) (16 - 38)  SpO2: 94% (08 Sep 2020 21:33) (91% - 99%)                          12.0   12.75 )-----------( 413      ( 08 Sep 2020 06:20 )             38.4     Physical exam    GENERAL: No acute distress, appears fatigued  CHEST/LUNG: Clear to auscultation bilaterally; No wheeze, equal breath sounds bilaterally   CVS: Regular rhythm tachycardic ; soft click , No murmurs, rubs, or gallops  ABDOMEN: Soft, mild tenderness LQ,  Nondistended; Bowel sounds present  EXTREMITIES:  No clubbing, cyanosis, trace edema  PSYCH:  anxious appearing , flat affect  NEUROLOGY: AAOx3, non-focal       45 year old female w/pmh Paranoid Schizophrenia, anxiety/ depression , recently diagnosed pelvic mass,  presents for persistent abdominal pain, tachycardic , tachypneic and labs w/ leukocytosis   Patient now with 2-3 episodes of bilious/clear fluid  vomiting  Patient had multiple episodes of vomiting since admission, on Zofran    N/V/Pelvic mass/ Ovarian lesion  Abdomen soft, BS+ in all 4 quadrants  X-ray abdomen with nonspecific bowel pattern with no obstruction as verbally reported by Radiologist, F/U official report  LR@ 100ML/HR Started for hydration  F/U 4 am electrolytes  Will order cT A/P / call surgery consult/insert NGT to decompress stomach if patient's symptoms worsening  Patient had CT A/p on 08/04 with no bowel obstruction  CTA with no PE  Zofran PRN for N/V  Monitor QTC  Keep pt NPO for now  Considr eenma if no SBO on imaging  Discussed possible NGt insertion with the pt to decompress the stomach if Abdominal x-ray with obstruction,  patient states that she would like to hold off Ngt insertion now, agreeable if continue to be vomiting.  Will discuss with Hospitalist in charge  Will continue to monitor    Ольга Lomeli [facility] [place]  [id number]

## 2020-09-09 NOTE — DISCHARGE NOTE NURSING/CASE MANAGEMENT/SOCIAL WORK - PATIENT PORTAL LINK FT
You can access the FollowMyHealth Patient Portal offered by [facility] by registering at the following website: [email or web address]. By joining [employer]’s FollowMyHealth portal, you will also be able to view your health information using other applications (apps) compatible with our system.

## 2020-09-09 NOTE — CHART NOTE - NSCHARTNOTEFT_GEN_A_CORE
Medicine NP note    CC: Nausea    Notified by RN that patient with nausea, no vomiting  Evaluated the patient at bedside. Patient lying  in bed, c/o nausea  Patient reports mild abdominal discomfort    Vital Signs Last 24 Hrs  T(C): 36.8 (09 Sep 2020 20:03), Max: 36.8 (09 Sep 2020 16:50)  T(F): 98.2 (09 Sep 2020 20:03), Max: 98.2 (09 Sep 2020 16:50)  HR: 98 (09 Sep 2020 20:03) (98 - 120)  BP: 141/85 (09 Sep 2020 20:03) (134/82 - 141/85)  BP(mean): --  RR: 18 (09 Sep 2020 20:03) (18 - 18)  SpO2: 94% (09 Sep 2020 20:03) (94% - 94%)     45 year old female w/pmh Paranoid Schizophrenia, anxiety/ depression , recently diagnosed pelvic mass,  presents for persistent abdominal pain, tachycardic , tachypneic and labs w/ leukocytosis   Patient with multiple  episodes of bilious/clear fluid  vomiting yesterday and during day  Zofran not helping per pt  CT A/p with malignant SBO    N/V/SBO/ Pelvic mass/ Ovarian lesion  Abdomen with mild distension and tenderness  No vomiting now  Will insert NGt to decompress the stomach if vomiting present  C/W Iv fluids  NPO  Will continue to monitor  F/U Ольга Lomeli [facility]  Department of Medicine  [id number] am    Ольга Lomeli [facility]  [id number] Medicine NP note    CC: Nausea    Notified by RN that patient with nausea, no vomiting  Evaluated the patient at bedside. Patient lying  in bed, c/o nausea  Patient reports mild abdominal discomfort    Vital Signs Last 24 Hrs  T(C): 36.8 (09 Sep 2020 20:03), Max: 36.8 (09 Sep 2020 16:50)  T(F): 98.2 (09 Sep 2020 20:03), Max: 98.2 (09 Sep 2020 16:50)  HR: 98 (09 Sep 2020 20:03) (98 - 120)  BP: 141/85 (09 Sep 2020 20:03) (134/82 - 141/85)  BP(mean): --  RR: 18 (09 Sep 2020 20:03) (18 - 18)  SpO2: 94% (09 Sep 2020 20:03) (94% - 94%)     45 year old female w/pmh Paranoid Schizophrenia, anxiety/ depression , recently diagnosed pelvic mass,  presents for persistent abdominal pain, tachycardic , tachypneic and labs w/ leukocytosis   Patient with multiple  episodes of bilious/clear fluid  vomiting yesterday and during day  Zofran not helping per pt  CT A/p with malignant SBO    N/V/SBO/ Pelvic mass/ Ovarian lesion  Abdomen with mild distension and tenderness  No vomiting now  Received Compazine IV earlier, heloping with nausea per pt, Compazine 10mg ivp x1 dose ordered  Will insert NGt to decompress the stomach if vomiting present  C/W Iv fluids  NPO  Will continue to monitor  F/U Ольга Lomeli [facility]  Department of Medicine  [id number] am    Ольга Lomeli [facility]  [id number] Medicine NP note    CC: Nausea    Notified by RN that patient with nausea, no vomiting  Evaluated the patient at bedside. Patient lying  in bed, c/o nausea  Patient reports mild abdominal discomfort    Vital Signs Last 24 Hrs  T(C): 36.8 (09 Sep 2020 20:03), Max: 36.8 (09 Sep 2020 16:50)  T(F): 98.2 (09 Sep 2020 20:03), Max: 98.2 (09 Sep 2020 16:50)  HR: 98 (09 Sep 2020 20:03) (98 - 120)  BP: 141/85 (09 Sep 2020 20:03) (134/82 - 141/85)  BP(mean): --  RR: 18 (09 Sep 2020 20:03) (18 - 18)  SpO2: 94% (09 Sep 2020 20:03) (94% - 94%)     45 year old female w/pmh Paranoid Schizophrenia, anxiety/ depression , recently diagnosed pelvic mass,  presents for persistent abdominal pain, tachycardic , tachypneic and labs w/ leukocytosis   Patient with multiple  episodes of bilious/clear fluid  vomiting yesterday and during day  Zofran not helping per pt  CT A/p with malignant SBO    N/V/SBO/ Pelvic mass/ Ovarian lesion  Abdomen with mild distension and tenderness  No vomiting now  Received Compazine IV earlier, heloping with nausea per pt, Compazine 10mg ivp x1 dose ordered  Will insert NGt to decompress the stomach if vomiting present, patient still hesitant about NGt insertion, she states that she will be agrreable to NGt if she continue to vomit,   C/W Iv fluids  NPO  Will continue to monitor  F/U Ольга Lomeli [facility]  Department of Medicine  [id number] am    Ольга Lomeli [facility]  [id number]

## 2020-09-09 NOTE — CHART NOTE - NSCHARTNOTEFT_GEN_A_CORE
Received call from [facility] with the following CT scan result:    IMPRESSION:  Limited noncontrast study.    Redemonstrated multiple cystic and solid pelvic masses, highly suspicious for malignancy. Increased, now small, abdominopelvic ascites with persistent infiltration of the omental fat.    Focal dilatation of a proximal jejunal loop with gradual transition in the mid abdomen, possibly early bowel obstruction. Correlate clinically. No distention of the proximal duodenum or stomach.      INCOMPLETE NOTE Received call from [facility] with the following CT scan result:    IMPRESSION:  Limited noncontrast study.    Redemonstrated multiple cystic and solid pelvic masses, highly suspicious for malignancy. Increased, now small, abdominopelvic ascites with persistent infiltration of the omental fat.    Focal dilatation of a proximal jejunal loop with gradual transition in the mid abdomen, possibly early bowel obstruction. Correlate clinically. No distention of the proximal duodenum or stomach.    No vomiting, so far, during day shift.    Dr Teresa informed and recc calling surgery team for consult.    Surgery paged at [phone]; received call back from Dr Aguillon (Resident) who will see patient.    ZOEY Miller [id number]

## 2020-09-09 NOTE — DISCHARGE NOTE PROVIDER - NSDCCPCAREPLAN_GEN_ALL_CORE_FT
PRINCIPAL DISCHARGE DIAGNOSIS  Diagnosis: Tachycardia  Assessment and Plan of Treatment: For transfer to [facility] for further management      SECONDARY DISCHARGE DIAGNOSES  Diagnosis: SBO (small bowel obstruction)  Assessment and Plan of Treatment: For transfer to [facility] for further management

## 2020-09-09 NOTE — CONSULT NOTE ADULT - REASON FOR ADMISSION
abdominal pain x several days

## 2020-09-09 NOTE — CONSULT NOTE ADULT - ASSESSMENT
Patient is a 45 year old female w/pmh Paranoid Schizophrenia, anxiety/ depression , recently diagnosed pelvic mass,  presents for persistent abdominal pain. Patient reports diffuse abdominal pain , associated with nausea and one episode of vomiting.  She reports some response to tylenol and oxycodone. She reports no chest pain , some shortness of breath mostly when laying flat,  she reports minimal lower extremity edema , no calf pain.   She has no fever or chills , no cough , no diarrhea , no dysuria. She reports decreased appetite and decreased po intake.   Patient was seen in the emergency room three days prior to admission for similar pain , imaging at that time revealed a  Large complex multiseptated pelvic mass  , she was evaluated by GYN and planned for further work up as outpatient (08 Sep 2020 05:47)    HOSP COURSE:    Pt with recently diagnosed pelvic mass, a/w persistent abdominal pain, tachycardic , tachypneic and labs w/ leukocytosis.  Tachycardia thought to be multi factorial including pain , anxiety  and dehydration.   No PE seen on CTA elevated ddimer  could be 2/2 to malignancy.  Pt on pain control.  CT c/a/p no evidence for infectious focus.  No localizing signs.  UA neg, Covid pcr (-).  No evidence for pna.      ID consulted for evaluation of leukocytosis.     Recommend:    - Pt with leukocytosis and tachycardia, possibly SIRS related to current malignancy.  No focus of infection on current imaging, no evidence for UTI or pna.  Send blood cultures x 2.  Will monitor pt off abx for now given low suspicion for active infection.    - Pt seen by GYN, planned for biopsy.  Suspected malignancy given complex pelvic mass and elevated tumor markers.      - Surgical evaluation noted, possible developing ileus/SBO.  AXR ordered.        d/w pt and pt's Father at bedside.      Will follow,    Maranda Estevez  [phone]

## 2020-09-09 NOTE — PROGRESS NOTE ADULT - PROBLEM SELECTOR PLAN 3
no localizing symptoms of infection , CTA w/ no evidence of pulmonary infection , UA neg for infection, Covid negative , afebrile  normotensive.  Procalcitonin low.  Incentive spirometry for atelectasis.  No fever; monitor off abx.

## 2020-09-09 NOTE — CONSULT NOTE ADULT - ASSESSMENT
45 year old female w/pmh Paranoid Schizophrenia, anxiety/ depression, recently diagnosed pelvic mass, presents for persistent abdominal pain associated with nausea and bilious vomiting found to have malignant SBO.     Plan  - NPO/NGT  - DVT ppx  - F/u GYN-ONC on plan for tissue biopsy  - Lovenox for DVT ppx  - Plan discussed with Attending, Dr. Lluvia Fowler MD  General Surgery, PGY 2 45 year old female w/pmh Paranoid Schizophrenia, anxiety/ depression, recently diagnosed pelvic mass, presents for persistent abdominal pain associated with nausea and bilious vomiting, uptrending leukocytosis, found to have malignant SBO.     Plan  - No surgical intervention at this time   - NPO/NGT  - DVT ppx  - F/u GYN-ONC on plan for tissue biopsy  - Lovenox for DVT ppx  - Plan discussed with Attending, Dr. Lluvia Fowler MD  General Surgery, PGY 2

## 2020-09-10 ENCOUNTER — INPATIENT (INPATIENT)
Facility: HOSPITAL | Age: 45
LOS: 8 days | Discharge: HOME | End: 2020-09-19
Attending: OBSTETRICS & GYNECOLOGY | Admitting: OBSTETRICS & GYNECOLOGY
Payer: MEDICARE

## 2020-09-10 VITALS
OXYGEN SATURATION: 97 % | TEMPERATURE: 98 F | RESPIRATION RATE: 22 BRPM | SYSTOLIC BLOOD PRESSURE: 141 MMHG | HEART RATE: 108 BPM | DIASTOLIC BLOOD PRESSURE: 79 MMHG

## 2020-09-10 DIAGNOSIS — N94.89 OTHER SPECIFIED CONDITIONS ASSOCIATED WITH FEMALE GENITAL ORGANS AND MENSTRUAL CYCLE: ICD-10-CM

## 2020-09-10 DIAGNOSIS — R10.9 UNSPECIFIED ABDOMINAL PAIN: ICD-10-CM

## 2020-09-10 DIAGNOSIS — Z86.79 PERSONAL HISTORY OF OTHER DISEASES OF THE CIRCULATORY SYSTEM: ICD-10-CM

## 2020-09-10 DIAGNOSIS — C56.9 MALIGNANT NEOPLASM OF UNSPECIFIED OVARY: ICD-10-CM

## 2020-09-10 DIAGNOSIS — Z71.89 OTHER SPECIFIED COUNSELING: ICD-10-CM

## 2020-09-10 DIAGNOSIS — R19.07 GENERALIZED INTRA-ABDOMINAL AND PELVIC SWELLING, MASS AND LUMP: ICD-10-CM

## 2020-09-10 PROBLEM — R01.1 CARDIAC MURMUR, UNSPECIFIED: Chronic | Status: ACTIVE | Noted: 2020-09-08

## 2020-09-10 PROBLEM — F41.9 ANXIETY DISORDER, UNSPECIFIED: Chronic | Status: ACTIVE | Noted: 2020-09-08

## 2020-09-10 LAB
ANION GAP SERPL CALC-SCNC: 12 MMO/L — SIGNIFICANT CHANGE UP (ref 7–14)
BLD GP AB SCN SERPL QL: NEGATIVE — SIGNIFICANT CHANGE UP
BUN SERPL-MCNC: 16 MG/DL — SIGNIFICANT CHANGE UP (ref 7–23)
CALCIUM SERPL-MCNC: 8.4 MG/DL — SIGNIFICANT CHANGE UP (ref 8.4–10.5)
CHLORIDE SERPL-SCNC: 105 MMOL/L — SIGNIFICANT CHANGE UP (ref 98–107)
CO2 SERPL-SCNC: 26 MMOL/L — SIGNIFICANT CHANGE UP (ref 22–31)
CREAT SERPL-MCNC: 0.57 MG/DL — SIGNIFICANT CHANGE UP (ref 0.5–1.3)
GLUCOSE SERPL-MCNC: 133 MG/DL — HIGH (ref 70–99)
HCG SERPL-ACNC: < 5 MIU/ML — SIGNIFICANT CHANGE UP
HCT VFR BLD CALC: 33.2 % — LOW (ref 34.5–45)
HGB BLD-MCNC: 10.2 G/DL — LOW (ref 11.5–15.5)
MAGNESIUM SERPL-MCNC: 2.4 MG/DL — SIGNIFICANT CHANGE UP (ref 1.6–2.6)
MCHC RBC-ENTMCNC: 27.9 PG — SIGNIFICANT CHANGE UP (ref 27–34)
MCHC RBC-ENTMCNC: 30.7 % — LOW (ref 32–36)
MCV RBC AUTO: 91 FL — SIGNIFICANT CHANGE UP (ref 80–100)
NRBC # FLD: 0 K/UL — SIGNIFICANT CHANGE UP (ref 0–0)
PHOSPHATE SERPL-MCNC: 3.4 MG/DL — SIGNIFICANT CHANGE UP (ref 2.5–4.5)
PLATELET # BLD AUTO: 384 K/UL — SIGNIFICANT CHANGE UP (ref 150–400)
PMV BLD: 9.6 FL — SIGNIFICANT CHANGE UP (ref 7–13)
POTASSIUM SERPL-MCNC: 4.2 MMOL/L — SIGNIFICANT CHANGE UP (ref 3.5–5.3)
POTASSIUM SERPL-SCNC: 4.2 MMOL/L — SIGNIFICANT CHANGE UP (ref 3.5–5.3)
RBC # BLD: 3.65 M/UL — LOW (ref 3.8–5.2)
RBC # FLD: 13.9 % — SIGNIFICANT CHANGE UP (ref 10.3–14.5)
RH IG SCN BLD-IMP: POSITIVE — SIGNIFICANT CHANGE UP
SODIUM SERPL-SCNC: 143 MMOL/L — SIGNIFICANT CHANGE UP (ref 135–145)
WBC # BLD: 11.98 K/UL — HIGH (ref 3.8–10.5)
WBC # FLD AUTO: 11.98 K/UL — HIGH (ref 3.8–10.5)

## 2020-09-10 PROCEDURE — 99233 SBSQ HOSP IP/OBS HIGH 50: CPT

## 2020-09-10 PROCEDURE — 93306 TTE W/DOPPLER COMPLETE: CPT | Mod: 26

## 2020-09-10 RX ORDER — RISPERIDONE 4 MG/1
1 TABLET ORAL DAILY
Refills: 0 | Status: DISCONTINUED | OUTPATIENT
Start: 2020-09-10 | End: 2020-09-19

## 2020-09-10 RX ORDER — ONDANSETRON 8 MG/1
4 TABLET, FILM COATED ORAL EVERY 4 HOURS
Refills: 0 | Status: DISCONTINUED | OUTPATIENT
Start: 2020-09-10 | End: 2020-09-10

## 2020-09-10 RX ORDER — ENOXAPARIN SODIUM 100 MG/ML
40 INJECTION SUBCUTANEOUS DAILY
Refills: 0 | Status: DISCONTINUED | OUTPATIENT
Start: 2020-09-10 | End: 2020-09-14

## 2020-09-10 RX ORDER — FLUOXETINE HCL 10 MG
40 CAPSULE ORAL DAILY
Refills: 0 | Status: DISCONTINUED | OUTPATIENT
Start: 2020-09-10 | End: 2020-09-19

## 2020-09-10 RX ORDER — SODIUM CHLORIDE 9 MG/ML
1000 INJECTION, SOLUTION INTRAVENOUS
Refills: 0 | Status: DISCONTINUED | OUTPATIENT
Start: 2020-09-10 | End: 2020-09-12

## 2020-09-10 RX ORDER — MORPHINE SULFATE 50 MG/1
2 CAPSULE, EXTENDED RELEASE ORAL EVERY 4 HOURS
Refills: 0 | Status: DISCONTINUED | OUTPATIENT
Start: 2020-09-10 | End: 2020-09-15

## 2020-09-10 RX ORDER — ONDANSETRON 8 MG/1
4 TABLET, FILM COATED ORAL EVERY 6 HOURS
Refills: 0 | Status: DISCONTINUED | OUTPATIENT
Start: 2020-09-10 | End: 2020-09-18

## 2020-09-10 RX ORDER — ACETAMINOPHEN 500 MG
650 TABLET ORAL EVERY 6 HOURS
Refills: 0 | Status: DISCONTINUED | OUTPATIENT
Start: 2020-09-10 | End: 2020-09-15

## 2020-09-10 RX ORDER — SOD SULF/SODIUM/NAHCO3/KCL/PEG
1 SOLUTION, RECONSTITUTED, ORAL ORAL EVERY 4 HOURS
Refills: 0 | Status: COMPLETED | OUTPATIENT
Start: 2020-09-10 | End: 2020-09-10

## 2020-09-10 RX ADMIN — Medication 10 MILLIGRAM(S): at 16:32

## 2020-09-10 RX ADMIN — ONDANSETRON 4 MILLIGRAM(S): 8 TABLET, FILM COATED ORAL at 23:14

## 2020-09-10 RX ADMIN — RISPERIDONE 1 MILLIGRAM(S): 4 TABLET ORAL at 14:32

## 2020-09-10 RX ADMIN — SODIUM CHLORIDE 75 MILLILITER(S): 9 INJECTION, SOLUTION INTRAVENOUS at 11:56

## 2020-09-10 RX ADMIN — Medication 40 MILLIGRAM(S): at 14:32

## 2020-09-10 RX ADMIN — Medication 10 MILLIGRAM(S): at 20:34

## 2020-09-10 RX ADMIN — ONDANSETRON 4 MILLIGRAM(S): 8 TABLET, FILM COATED ORAL at 17:59

## 2020-09-10 RX ADMIN — Medication 1 LITER(S): at 18:17

## 2020-09-10 RX ADMIN — ONDANSETRON 4 MILLIGRAM(S): 8 TABLET, FILM COATED ORAL at 12:55

## 2020-09-10 RX ADMIN — Medication 1 LITER(S): at 21:44

## 2020-09-10 RX ADMIN — SODIUM CHLORIDE 75 MILLILITER(S): 9 INJECTION, SOLUTION INTRAVENOUS at 23:15

## 2020-09-10 RX ADMIN — SODIUM CHLORIDE 75 MILLILITER(S): 9 INJECTION, SOLUTION INTRAVENOUS at 18:18

## 2020-09-10 RX ADMIN — SODIUM CHLORIDE 75 MILLILITER(S): 9 INJECTION, SOLUTION INTRAVENOUS at 05:36

## 2020-09-10 RX ADMIN — ENOXAPARIN SODIUM 40 MILLIGRAM(S): 100 INJECTION SUBCUTANEOUS at 11:57

## 2020-09-10 NOTE — H&P ADULT - ASSESSMENT
A/P 46 y/o G0, LMP 96/20, with large complex multiseptated pelvic mass and peritoneal carcinomatosis suspicious for ovarian malignancy admitted with abdominal pain. Patient tachycardic to the 110s, vitals otherwise stable. Exam with moderate abdominal distention and non-focal tenderness.  and CA 19-9 elevated. CT chest without evidence of metastatic disease.

## 2020-09-10 NOTE — PROGRESS NOTE ADULT - PROBLEM SELECTOR PLAN 1
Neuro: Continue Tylenol with Morphine PRN for pain control. Continue home Risperdal and Prozac for PMHx of paranoid schizophrenia, anxiety and depression.   CV: PMHx of Pulmonic stenosis, patient tachycardic overnight, otherwise normal vital signs. Cardiology consulted while patient was at [facility], appreciate recommendation for TTE - currently ordered. Will f/u results and consult [facility].   Pulm: Saturating well on RA. Increase incentive spirometry.  GI: NPO. Will consult GI regarding possible inpatient colonoscopy. Zofran PRN available for nausea.   : Voiding spontaneously.   FEN: Continue D5LR @75cc/hr. AM BMP/Mg/Phos pending. Will replete electrolytes PRN.   ID: No active issues.   Heme: Continue Lovenox and Venodynes for DVT ppx. Increase ambulation.     Dispo: Continue inpatient management, will consult GI and Cardiology. Follow-up TTE per [facility] cardiology recommendations.    Patient seen and evaluated with GYN Oncology team.   Belén Merida PGY-2

## 2020-09-10 NOTE — PROGRESS NOTE ADULT - SUBJECTIVE AND OBJECTIVE BOX
Gyn ONC Progress Note HD#1    Patient seen and examined at bedside on morning rounds. She endorses fatigue and diffuse abdominal pain this morning. Ms. Catalan reports passing a bowel movement yesterday while at [facility], and states her last episode of emesis was at approximately 10:00PM, prior to transfer to [facility]. She endorses continued nausea, however inquired regarding attempting sips of water. Patient ambulating throughout her room without lightheadedness or dizziness.  Patient denies fever, chills, chest pain, SOB.      Objective:  T(F): 98.6 (09-10-20 @ 06:00), Max: 98.6 (09-10-20 @ 06:00)  HR: 80 (09-10-20 @ 06:00) (80 - 115)  BP: 133/65 (09-10-20 @ 06:00) (133/65 - 141/85)  RR: 18 (09-10-20 @ 06:00) (18 - 22)  SpO2: 97% (09-10-20 @ 06:00) (92% - 97%)      I&O's Summary  09 Sep 2020 07:01  -  10 Sep 2020 06:38  --------------------------------------------------------  IN: 300 mL / OUT: 400 mL / NET: -100 mL        MEDICATIONS  (STANDING):  dextrose 5% + lactated ringers. 1000 milliLiter(s) (75 mL/Hr) IV Continuous <Continuous>  enoxaparin Injectable 40 milliGRAM(s) SubCutaneous daily  FLUoxetine 40 milliGRAM(s) Oral daily  risperiDONE   Tablet 1 milliGRAM(s) Oral daily    MEDICATIONS  (PRN):  acetaminophen   Tablet .. 650 milliGRAM(s) Oral every 6 hours PRN Mild Pain (1 - 3)  morphine  - Injectable 2 milliGRAM(s) IV Push every 4 hours PRN Moderate Pain (4 - 6)  ondansetron Injectable 4 milliGRAM(s) IV Push every 4 hours PRN Nausea and/or Vomiting      Physical Exam:  Constitutional: NAD, A+O x3  CV: Tachycardic   Lungs: Clear to auscultation bilaterally  Abdomen: Softly distended. Diffuse tenderness to palpation. No rebound tenderness or guarding. No bowel sounds.   Extremities: 1+ bilateral LE edema, no calf tenderness bilaterally; Venodynes in place      LABS:  09-09  139    |  98     |  17     ----------------------------<  128<H>  4.2     |  23     |  0.59     Ca    9.2        09 Sep 2020 06:42    TPro  6.7    /  Alb  3.3    /  TBili  0.4    /  DBili  x      /  AST  14     /  ALT  10     /  AlkPhos  88     09-09 Gyn ONC Progress Note HD#1    Patient seen and examined at bedside on morning rounds. She endorses fatigue and generalized mild abdominal pain this morning. She reports passing a bowel movement yesterday while at [facility], and states her last episode of emesis was at approximately 10:00PM, prior to transfer to [facility]. She endorses continued nausea, though she is requesting to eat this morning. Patient ambulating throughout her room without lightheadedness or dizziness.  Patient denies fever, chills, chest pain, SOB.      Objective:  T(F): 98.6 (09-10-20 @ 06:00), Max: 98.6 (09-10-20 @ 06:00)  HR: 80 (09-10-20 @ 06:00) (80 - 115)  BP: 133/65 (09-10-20 @ 06:00) (133/65 - 141/85)  RR: 18 (09-10-20 @ 06:00) (18 - 22)  SpO2: 97% (09-10-20 @ 06:00) (92% - 97%)      I&O's Summary  09 Sep 2020 07:01  -  10 Sep 2020 06:38  --------------------------------------------------------  IN: 300 mL / OUT: 400 mL / NET: -100 mL        MEDICATIONS  (STANDING):  dextrose 5% + lactated ringers. 1000 milliLiter(s) (75 mL/Hr) IV Continuous <Continuous>  enoxaparin Injectable 40 milliGRAM(s) SubCutaneous daily  FLUoxetine 40 milliGRAM(s) Oral daily  risperiDONE   Tablet 1 milliGRAM(s) Oral daily    MEDICATIONS  (PRN):  acetaminophen   Tablet .. 650 milliGRAM(s) Oral every 6 hours PRN Mild Pain (1 - 3)  morphine  - Injectable 2 milliGRAM(s) IV Push every 4 hours PRN Moderate Pain (4 - 6)  ondansetron Injectable 4 milliGRAM(s) IV Push every 4 hours PRN Nausea and/or Vomiting      Physical Exam:  Constitutional: NAD, A+O x3  CV: Tachycardic   Lungs: Clear to auscultation bilaterally  Abdomen: Softly distended. Diffuse tenderness to palpation. No rebound tenderness or guarding. No bowel sounds.   Extremities: 1+ bilateral LE edema, no calf tenderness bilaterally; Venodynes in place      LABS:  09-09  139    |  98     |  17     ----------------------------<  128<H>  4.2     |  23     |  0.59     Ca    9.2        09 Sep 2020 06:42    TPro  6.7    /  Alb  3.3    /  TBili  0.4    /  DBili  x      /  AST  14     /  ALT  10     /  AlkPhos  88     09-09

## 2020-09-10 NOTE — CONSULT NOTE ADULT - ASSESSMENT
44 y/o G0, LMP 96/20, with large complex multiseptated pelvic mass and peritoneal carcinomatosis suspicious for ovarian malignancy admitted with abdominal pain. Patient tachycardic to the 110s, vitals otherwise stable. Exam with moderate abdominal distention and non-focal tenderness.  and CA 19-9 elevated. CT chest without evidence of metastatic disease.

## 2020-09-10 NOTE — H&P ADULT - HISTORY OF PRESENT ILLNESS
REBECCA CALL  45y  Female [id number]    HPI: 44 y/o G0, LMP 96/20, admitted with abdominal pain. Patient s/p ED visit 9/4 with same complaint. Found to have large complex multiseptated pelvic mass with possible peritoneal carcinomatosis at that time concerning for ovarian malignancy. GYN consulted, recommended tumor markers and outpatient follow up with GYN ONC. Patient admitted today for persistant abdominal pain. Patient reports that pain since ED visit has continued and has not responded to Tylenol and Oxy. Patient not taking NSAIDs as she is allergic. Patient also endorses nausea with infrequent vomiting. Unable to tolerating full PO secondary to pain and decreased appetite. Also reports not passing gas or having a bowel movement since three days ago. Normally patient has a bowel movement daily. Reports small caliber stools. Denies bloody stool, tarry stools. Denies fevers, chills, urinary symptoms. Endorses some shortness of breath when laying flat. Denies mood symptoms currently.     Name of GYN Physician: Dr. Ocasio, [facility], Phone: [phone]    OBHx: G0, virginal  GYNHx: Denies fibroids, cysts, endometriosis, STI's, Abnormal pap smears.  PMH: pulmonary valve stenosis, heart murmur, anxiety, depression Paranoid schizophrenia  PSH: denies  Meds: Prozac, Risperdal, reports compliance with medications at home  All: NSAIDs (angioedema)  Famhx: denies family history of breast, uterine, ovarian, colon cancers  Soc: lives at home with mother who is hard of hearing. gives permission for us to contact her brother Jevon Call  HCM: last pap 7/23/2018 NIL per phone conversation w/ pt's OB/GYN office. last mammo 1 year ago wnl per patient, never had colonoscopy REBECCA CALL  45y  Female [id number]    HPI: 44 y/o G0, LMP 96/20, admitted with abdominal pain. Patient s/p ED visit 9/4 with same complaint. Found to have large complex multiseptated pelvic mass with possible peritoneal carcinomatosis at that time concerning for ovarian malignancy. GYN consulted, recommended tumor markers and outpatient follow up with GYN ONC. Patient admitted today for persistant abdominal pain. Patient reports that pain since ED visit has continued and has not responded to Tylenol and Oxy. Patient not taking NSAIDs as she is allergic. Patient also endorses nausea with infrequent vomiting. Unable to tolerating full PO secondary to pain and decreased appetite. Also reports not passing gas or having a bowel movement since three days ago. Normally patient has a bowel movement daily. Reports small caliber stools. Denies bloody stool, tarry stools. Denies fevers, chills, urinary symptoms. Endorses some shortness of breath when laying flat. Denies mood symptoms currently.     Interval Events  Pt was transferred to [facility] for pre-op workup as well as possible OR procedure. At this time patient continues to feel nauseous but has not vomited since her transfer her. Currently states pain is well controlled. Denies any headaches, fevers, chills, CP or SOB.    Name of GYN Physician: Dr. Ocasio, [facility], Phone: [phone]    OBHx: G0, virginal  GYNHx: Denies fibroids, cysts, endometriosis, STI's, Abnormal pap smears.  PMH: pulmonary valve stenosis, heart murmur, anxiety, depression Paranoid schizophrenia  PSH: denies  Meds: Prozac, Risperdal, reports compliance with medications at home  All: NSAIDs (angioedema)  Famhx: denies family history of breast, uterine, ovarian, colon cancers  Soc: lives at home with mother who is hard of hearing. gives permission for us to contact her brother Jevon Call  HCM: last pap 7/23/2018 NIL per phone conversation w/ pt's OB/GYN office. last mammo 1 year ago wnl per patient, never had colonoscopy

## 2020-09-10 NOTE — CONSULT NOTE ADULT - SUBJECTIVE AND OBJECTIVE BOX
General Surgery Consult    Consulting surgical team: A Team [id number]  Consulting attending: Dr. Segundo Turner    HPI:  Patient is a 45 year old female w/pmh Paranoid Schizophrenia, anxiety/ depression, recently diagnosed pelvic mass,  presents for persistent abdominal pain. Patient reports diffuse abdominal pain which started in July. Patient initially presented to [facility] ED on 9/3/20 due to persistent and worsening abdominal pain. CT at that time demonstrated large complex pelvic mass with elevated / CA 19/9 and patient was discharged home with plan for outpatient workup. Patient then represented to [facility] ED on 9/8/20 with persistent abdominal pain associated with nausea and bilious vomiting. CT at that point demonstrated same large complex pelvic mass, with focal dilated loop of jejunum concerning for early SBO. Patient was then transferred to [facility] for further workup and operative planning. Upon evaluation patient states lower abdominal pain is well controlled, denies nausea/vomiting, reports she is passing minimal gas but is still passing thin-caliber stools. Current plan for colonoscopy with GI tomorrow to assess for GI involvement of the mass and OR planning for next week.      PAST MEDICAL HISTORY:  H/O pulmonary valve stenosis  Heart murmur  Anxiety  Paranoid schizophrenia      PAST SURGICAL HISTORY:  No significant past surgical history      MEDICATIONS:  acetaminophen   Tablet .. 650 milliGRAM(s) Oral every 6 hours PRN  bisacodyl 10 milliGRAM(s) Oral every 4 hours  dextrose 5% + lactated ringers. 1000 milliLiter(s) IV Continuous <Continuous>  enoxaparin Injectable 40 milliGRAM(s) SubCutaneous daily  FLUoxetine 40 milliGRAM(s) Oral daily  morphine  - Injectable 2 milliGRAM(s) IV Push every 4 hours PRN  ondansetron Injectable 4 milliGRAM(s) IV Push every 6 hours  polyethylene glycol/electrolyte Solution 1 Liter(s) Oral every 4 hours  risperiDONE   Tablet 1 milliGRAM(s) Oral daily      ALLERGIES:  ibuprofen (Other)      VITALS & I/Os:  Vital Signs Last 24 Hrs  T(C): 37 (10 Sep 2020 17:22), Max: 37 (10 Sep 2020 06:00)  T(F): 98.6 (10 Sep 2020 17:22), Max: 98.6 (10 Sep 2020 06:00)  HR: 93 (10 Sep 2020 17:22) (80 - 115)  BP: 133/66 (10 Sep 2020 17:22) (131/71 - 141/85)  BP(mean): --  RR: 18 (10 Sep 2020 17:22) (17 - 22)  SpO2: 93% (10 Sep 2020 17:22) (92% - 97%)    I&O's Summary    09 Sep 2020 07:01  -  10 Sep 2020 07:00  --------------------------------------------------------  IN: 300 mL / OUT: 400 mL / NET: -100 mL    10 Sep 2020 07:01  -  10 Sep 2020 18:14  --------------------------------------------------------  IN: 600 mL / OUT: 750 mL / NET: -150 mL        PHYSICAL EXAM:  General: No acute distress  Respiratory: Nonlabored  Cardiovascular: RRR  Abdominal: firmly distended in lower abdomen, nontender, no rebound/guarding  Extremities: Warm    LABS:                        10.2   11.98 )-----------( 384      ( 10 Sep 2020 05:30 )             33.2     09-10    143  |  105  |  16  ----------------------------<  133<H>  4.2   |  26  |  0.57    Ca    8.4      10 Sep 2020 05:30  Phos  3.4     09-10  Mg     2.4     09-10    TPro  6.7  /  Alb  3.3  /  TBili  0.4  /  DBili  x   /  AST  14  /  ALT  10  /  AlkPhos  88  09-09      IMAGING:    < from: CT Abdomen and Pelvis No Cont (09.09.20 @ 09:52) >  FINDINGS:    Evaluation of the parenchymal organs and vascular structures is limited without intravenous contrast.    LOWER CHEST: Bibasilar dependent consolidations, likely subsegmental atelectasis.    LIVER: Within normal limits.  BILE DUCTS: Normal caliber.  GALLBLADDER: Vicarious excretion of contrast from prior study.  SPLEEN: Within normal limits.  PANCREAS: Within normal limits.  ADRENALS: Within normal limits.  KIDNEYS/URETERS: Absent left kidney. Normal right kidney.    BLADDER: Within normal limits.  REPRODUCTIVE ORGANS: Redemonstrated multiple cystic and solid pelvic masses, highly suspicious for malignancy.    BOWEL: Since prior study, there is focal dilatation of a proximal jejunal loop with gradual transition to underdistended small bowel in the mid abdomen (series 3, image 107). Upstream duodenum and stomach is nondistended.  PERITONEUM: Small volume abdominopelvic ascites, increased from trace to small on 09/04/2020 with persistent infiltration of the omental fat.  VESSELS: Within normal limits.  RETROPERITONEUM/LYMPH NODES: No lymphadenopathy.  ABDOMINAL WALL: A tiny fat-containing umbilical hernia.  BONES: Degenerative changes.    IMPRESSION:  Limited noncontrast study.    Redemonstrated multiple cystic and solid pelvic masses, highly suspicious for malignancy. Increased, now small, abdominopelvic ascites with persistent infiltration of the omental fat.    Focal dilatation of a proximal jejunal loop with gradual transition in the mid abdomen, possibly early bowel obstruction. Correlate clinically. No distention of the proximal duodenum or stomach.    < end of copied text >

## 2020-09-10 NOTE — CONSULT NOTE ADULT - PROBLEM SELECTOR RECOMMENDATION 9
-CT reviewed   -r/o malignancy; CA-125 and CA 19-9 both elevated  -daily labs   -gi ppx with protonix qd  -pain control prn/zofran prn   -clear diet today  -bowel prep ordered  -NPO p MN for EGD/Colonscopy tomorrow -CT reviewed   -r/o malignancy; CA-125 and CA 19-9 both elevated  -daily labs   -gi ppx with protonix qd  -pain control prn/zofran prn   -clear diet today -CT reviewed   -r/o malignancy; CA-125 and CA 19-9 both elevated  -daily labs   -gi ppx with protonix qd  -pain control prn/zofran prn   -clear diet today, bowel prep ordered  -NPO p MN -CT reviewed  -r/o malignancy; CA-125 and CA 19-9 both elevated  -daily labs   -gi ppx with protonix qd  -pain control prn/zofran prn   -clear diet today, bowel prep ordered  -NPO p MN for egd/colonoscopy tomorrow  -if pt with excessive vomiting during prep, may need to place NGT for decompression and postpone colonoscopy

## 2020-09-10 NOTE — CONSULT NOTE ADULT - SUBJECTIVE AND OBJECTIVE BOX
Chief Complaint:  Patient is a 45y old  Female who presents with a chief complaint of concern for ovarian malignancy (10 Sep 2020 11:55)    H/O pulmonary valve stenosis  Heart murmur  Anxiety  Paranoid schizophrenia  No significant past surgical history     HPI:  REBECCA CALL  45y  Female [id number]    HPI: 44 yo G0, LMP 20, admitted with abdominal pain. Patient s/p ED visit  with same complaint. Found to have large complex multiseptated pelvic mass with possible peritoneal carcinomatosis at that time concerning for ovarian malignancy. GYN consulted, recommended tumor markers and outpatient follow up with GYN ONC. Patient admitted today for persistant abdominal pain. Patient reports that pain since ED visit has continued and has not responded to Tylenol and Oxy. Patient not taking NSAIDs as she is allergic. Patient also endorses nausea with infrequent vomiting. Unable to tolerating full PO secondary to pain and decreased appetite. Also reports not passing gas or having a bowel movement since three days ago. Normally patient has a bowel movement daily. Reports small caliber stools. Denies bloody stool, tarry stools. Denies fevers, chills, urinary symptoms. Endorses some shortness of breath when laying flat. Denies mood symptoms currently. GI consulted for abdominal pain and abnormal CT. Pt endorses abdominal pain in epigastrium and periumbilical with generalized fatigue. She does not recall any melena or hematochezia. She has not had colonoscopy or endoscopy in the past. Her appetite has been poor 2/2 increased pain in her abdomen and overall poor appetite with associated episodes of nausea and at times vomiting, but usually spit up    Interval Events  Pt was transferred to [facility] for pre-op workup as well as possible OR procedure. At this time patient continues to feel nauseous but has not vomited since her transfer her. Currently states pain is well controlled. Denies any headaches, fevers, chills, CP or SOB.    Name of GYN Physician: Dr. Ocasio, [facility], Phone: [phone]    OBHx: G0, virginal  GYNHx: Denies fibroids, cysts, endometriosis, STI's, Abnormal pap smears.  PMH: pulmonary valve stenosis, heart murmur, anxiety, depression Paranoid schizophrenia  PSH: denies  Meds: Prozac, Risperdal, reports compliance with medications at home  All: NSAIDs (angioedema)  Famhx: denies family history of breast, uterine, ovarian, colon cancers  Soc: lives at home with mother who is hard of hearing. gives permission for us to contact her brother Jevon Call  HCM: last pap 2018 NIL per phone conversation w/ pt's OB/GYN office. last mammo 1 year ago wnl per patient, never had colonoscopy (10 Sep 2020 00:35)      ibuprofen (Other)      acetaminophen   Tablet .. 650 milliGRAM(s) Oral every 6 hours PRN  dextrose 5% + lactated ringers. 1000 milliLiter(s) IV Continuous <Continuous>  enoxaparin Injectable 40 milliGRAM(s) SubCutaneous daily  FLUoxetine 40 milliGRAM(s) Oral daily  morphine  - Injectable 2 milliGRAM(s) IV Push every 4 hours PRN  ondansetron Injectable 4 milliGRAM(s) IV Push every 6 hours  risperiDONE   Tablet 1 milliGRAM(s) Oral daily        FAMILY HISTORY:  FH: skin cancer  FH: diabetes mellitus        Review of Systems:    General:  No wt loss, fevers, chills, night sweats, fatigue  Eyes:  Good vision, no reported pain  ENT:  No sore throat, pain, runny nose, dysphagia  CV:  No pain, palpitations, no lightheadedness  Resp:  No dyspnea, cough, tachypnea, wheezing  GI: as above  :  No pain, bleeding, incontinence, nocturia  Muscle:  No pain, weakness  Neuro:  No weakness, tingling, memory problems  Psych:  No fatigue, insomnia, mood problems, depression  Endocrine:  No polyuria, polydypsia, cold/heat intolerance  Heme:  No petechiae, ecchymosis, easy bruisability  Skin:  No rash, tattoos, scars, edema    Relevant Family History:   n/c    Relevant Social History: n/c      Physical Exam:    Vital Signs:  Vital Signs Last 24 Hrs  T(C): 36.4 (10 Sep 2020 10:12), Max: 37 (10 Sep 2020 06:00)  T(F): 97.6 (10 Sep 2020 10:12), Max: 98.6 (10 Sep 2020 06:00)  HR: 104 (10 Sep 2020 10:12) (80 - 115)  BP: 138/79 (10 Sep 2020 10:12) (133/65 - 141/85)  BP(mean): --  RR: 18 (10 Sep 2020 10:12) (18 - 22)  SpO2: 92% (10 Sep 2020 10:12) (92% - 97%)  Daily Height in cm: 170.18 (10 Sep 2020 06:00)    Daily Weight in k.2 (10 Sep 2020 06:00)    General:  Appears stated age, well-groomed, nad  HEENT:  NC/AT,  conjunctivae clear and pink, no thyromegaly, nodules, adenopathy, no JVD  Chest:  Full & symmetric excursion, no increased effort, breath sounds clear  Cardiovascular:  Regular rhythm, S1, S2, no murmur/rub/S3/S4, no abdominal bruit, no edema  Abdomen:  Soft, +ttp, +distended, normoactive bowel sounds,  no masses ,no hepatosplenomeagaly, no signs of chronic liver disease  Extremities:  no cyanosis,clubbing or edema  Skin:  No rash/erythema/ecchymoses/petechiae/wounds/abscess/warm/dry  Neuro/Psych:  A&O  x3, no asterixis, no tremor, no encephalopathy    Laboratory:                            10.2   11.98 )-----------( 384      ( 10 Sep 2020 05:30 )             33.2     09-10    143  |  105  |  16  ----------------------------<  133<H>  4.2   |  26  |  0.57    Ca    8.4      10 Sep 2020 05:30  Phos  3.4     09-10  Mg     2.4     09-10    TPro  6.7  /  Alb  3.3  /  TBili  0.4  /  DBili  x   /  AST  14  /  ALT  10  /  AlkPhos  88  09-09    LIVER FUNCTIONS - ( 09 Sep 2020 06:42 )  Alb: 3.3 g/dL / Pro: 6.7 g/dL / ALK PHOS: 88 U/L / ALT: 10 U/L / AST: 14 U/L / GGT: x                 Imaging:      < from: CT Abdomen and Pelvis No Cont (20 @ 09:52) >    EXAM:  CT ABDOMEN AND PELVIS                            PROCEDURE DATE:  2020            INTERPRETATION:  CLINICAL INFORMATION: Abdominal distention/vomiting. Pelvic mass.    COMPARISON: CT abdomen and pelvis 2020. Ultrasound pelvis 2020.    PROCEDURE:  CT of the Abdomen and Pelvis was performed without intravenous contrast.  Intravenous contrast: None.  Oral contrast: None.  Sagittal and coronal reformats were performed.    FINDINGS:    Evaluation of the parenchymal organs and vascular structures is limited without intravenous contrast.    LOWER CHEST: Bibasilar dependent consolidations, likely subsegmental atelectasis.    LIVER: Within normal limits.  BILE DUCTS: Normal caliber.  GALLBLADDER: Vicarious excretion of contrast from prior study.  SPLEEN: Within normal limits.  PANCREAS: Within normal limits.  ADRENALS: Within normal limits.  KIDNEYS/URETERS: Absent left kidney. Normal right kidney.    BLADDER: Within normal limits.  REPRODUCTIVE ORGANS: Redemonstrated multiple cystic and solid pelvic masses, highly suspicious for malignancy.    BOWEL: Since prior study, there is focal dilatation of a proximal jejunal loop with gradual transition to underdistended small bowel in the mid abdomen (series 3, image 107). Upstream duodenum and stomach is nondistended.  PERITONEUM: Small volume abdominopelvic ascites, increased from trace to small on 2020 with persistent infiltration of the omental fat.  VESSELS: Within normal limits.  RETROPERITONEUM/LYMPH NODES: No lymphadenopathy.  ABDOMINAL WALL: A tiny fat-containing umbilical hernia.  BONES: Degenerative changes.    IMPRESSION:  Limited noncontrast study.    Redemonstrated multiple cystic and solid pelvic masses, highly suspicious for malignancy. Increased, now small, abdominopelvic ascites with persistent infiltration of the omental fat.    Focal dilatation of a proximal jejunal loop with gradual transition in the mid abdomen, possibly early bowel obstruction. Correlate clinically. No distention of the proximal duodenum or stomach.    Findings were discussed with ZOEY Miller 2020 11:42 AM by Dr. Benton with repeat back confirmation.              PRADIP SERVIN M.D., RADIOLOGY RESIDENT  This document has been electronically signed.  ANN MARIE BENTON M.D., ATTENDING RADIOLOGIST  This document has been electronically signed. Sep  9 2020 11:43AM                < end of copied text > Chief Complaint:  Patient is a 45y old  Female who presents with a chief complaint of concern for ovarian malignancy (10 Sep 2020 11:55)    H/O pulmonary valve stenosis  Heart murmur  Anxiety  Paranoid schizophrenia  No significant past surgical history     HPI:  REBECCA CALL  45y  Female [id number]    HPI: 46 yo G0, LMP 20, admitted with abdominal pain. Patient s/p ED visit  with same complaint. Found to have large complex multiseptated pelvic mass with possible peritoneal carcinomatosis at that time concerning for ovarian malignancy. GYN consulted, recommended tumor markers and outpatient follow up with GYN ONC. Patient admitted today for persistant abdominal pain. Patient reports that pain since ED visit has continued and has not responded to Tylenol and Oxy. Patient not taking NSAIDs as she is allergic. Patient also endorses nausea with infrequent vomiting. Unable to tolerating full PO secondary to pain and decreased appetite. Also reports not passing gas or having a bowel movement since three days ago. Normally patient has a bowel movement daily. Reports small caliber stools. Denies bloody stool, tarry stools. Denies fevers, chills, urinary symptoms. Endorses some shortness of breath when laying flat. Denies mood symptoms currently. GI consulted for abdominal pain and abnormal CT. Pt endorses abdominal pain in epigastrium and periumbilical with generalized fatigue. She does not recall any melena or hematochezia. She has not had colonoscopy or endoscopy in the past. Her appetite has been poor 2/2 increased pain in her abdomen and overall poor appetite with associated episodes of nausea and at times vomiting, but usually spit up. Today she is without nausea or vomiting. She states she "may" have passed gas but cannot recall, denied bm. She is willing to try to drink prep for colonoscopy tomorrow. No FHx of colon or gastric ca    Interval Events  Pt was transferred to [facility] for pre-op workup as well as possible OR procedure. At this time patient continues to feel nauseous but has not vomited since her transfer her. Currently states pain is well controlled. Denies any headaches, fevers, chills, CP or SOB.    Name of GYN Physician: Dr. Ocasio, [facility], Phone: [phone]    OBHx: G0, virginal  GYNHx: Denies fibroids, cysts, endometriosis, STI's, Abnormal pap smears.  PMH: pulmonary valve stenosis, heart murmur, anxiety, depression Paranoid schizophrenia  PSH: denies  Meds: Prozac, Risperdal, reports compliance with medications at home  All: NSAIDs (angioedema)  Famhx: denies family history of breast, uterine, ovarian, colon cancers  Soc: lives at home with mother who is hard of hearing. gives permission for us to contact her brother Jevon Call  HCM: last pap 2018 NIL per phone conversation w/ pt's OB/GYN office. last mammo 1 year ago wnl per patient, never had colonoscopy (10 Sep 2020 00:35)      ibuprofen (Other)      acetaminophen   Tablet .. 650 milliGRAM(s) Oral every 6 hours PRN  dextrose 5% + lactated ringers. 1000 milliLiter(s) IV Continuous <Continuous>  enoxaparin Injectable 40 milliGRAM(s) SubCutaneous daily  FLUoxetine 40 milliGRAM(s) Oral daily  morphine  - Injectable 2 milliGRAM(s) IV Push every 4 hours PRN  ondansetron Injectable 4 milliGRAM(s) IV Push every 6 hours  risperiDONE   Tablet 1 milliGRAM(s) Oral daily        FAMILY HISTORY:  FH: skin cancer  FH: diabetes mellitus        Review of Systems:    General:  No wt loss, fevers, chills, night sweats, fatigue  Eyes:  Good vision, no reported pain  ENT:  No sore throat, pain, runny nose, dysphagia  CV:  No pain, palpitations, no lightheadedness  Resp:  No dyspnea, cough, tachypnea, wheezing  GI: as above  :  No pain, bleeding, incontinence, nocturia  Muscle:  No pain, weakness  Neuro:  No weakness, tingling, memory problems  Psych:  No fatigue, insomnia, mood problems, depression  Endocrine:  No polyuria, polydypsia, cold/heat intolerance  Heme:  No petechiae, ecchymosis, easy bruisability  Skin:  No rash, tattoos, scars, edema    Relevant Family History:   n/c    Relevant Social History: n/c      Physical Exam:    Vital Signs:  Vital Signs Last 24 Hrs  T(C): 36.4 (10 Sep 2020 10:12), Max: 37 (10 Sep 2020 06:00)  T(F): 97.6 (10 Sep 2020 10:12), Max: 98.6 (10 Sep 2020 06:00)  HR: 104 (10 Sep 2020 10:12) (80 - 115)  BP: 138/79 (10 Sep 2020 10:12) (133/65 - 141/85)  BP(mean): --  RR: 18 (10 Sep 2020 10:12) (18 - 22)  SpO2: 92% (10 Sep 2020 10:12) (92% - 97%)  Daily Height in cm: 170.18 (10 Sep 2020 06:00)    Daily Weight in k.2 (10 Sep 2020 06:00)    General:  Appears stated age, well-groomed, nad  HEENT:  NC/AT,  conjunctivae clear and pink, no thyromegaly, nodules, adenopathy, no JVD  Chest:  Full & symmetric excursion, no increased effort, breath sounds clear  Cardiovascular:  Regular rhythm, S1, S2, no murmur/rub/S3/S4, no abdominal bruit, no edema  Abdomen:  Soft, +ttp, +distended, normoactive bowel sounds,  no masses ,no hepatosplenomeagaly, no signs of chronic liver disease  Extremities:  no cyanosis,clubbing or edema  Skin:  No rash/erythema/ecchymoses/petechiae/wounds/abscess/warm/dry  Neuro/Psych:  A&O  x3, no asterixis, no tremor, no encephalopathy    Laboratory:                            10.2   11.98 )-----------( 384      ( 10 Sep 2020 05:30 )             33.2     09-10    143  |  105  |  16  ----------------------------<  133<H>  4.2   |  26  |  0.57    Ca    8.4      10 Sep 2020 05:30  Phos  3.4     09-10  Mg     2.4     09-10    TPro  6.7  /  Alb  3.3  /  TBili  0.4  /  DBili  x   /  AST  14  /  ALT  10  /  AlkPhos  88  09-09    LIVER FUNCTIONS - ( 09 Sep 2020 06:42 )  Alb: 3.3 g/dL / Pro: 6.7 g/dL / ALK PHOS: 88 U/L / ALT: 10 U/L / AST: 14 U/L / GGT: x                 Imaging:      < from: CT Abdomen and Pelvis No Cont (20 @ 09:52) >    EXAM:  CT ABDOMEN AND PELVIS                            PROCEDURE DATE:  2020            INTERPRETATION:  CLINICAL INFORMATION: Abdominal distention/vomiting. Pelvic mass.    COMPARISON: CT abdomen and pelvis 2020. Ultrasound pelvis 2020.    PROCEDURE:  CT of the Abdomen and Pelvis was performed without intravenous contrast.  Intravenous contrast: None.  Oral contrast: None.  Sagittal and coronal reformats were performed.    FINDINGS:    Evaluation of the parenchymal organs and vascular structures is limited without intravenous contrast.    LOWER CHEST: Bibasilar dependent consolidations, likely subsegmental atelectasis.    LIVER: Within normal limits.  BILE DUCTS: Normal caliber.  GALLBLADDER: Vicarious excretion of contrast from prior study.  SPLEEN: Within normal limits.  PANCREAS: Within normal limits.  ADRENALS: Within normal limits.  KIDNEYS/URETERS: Absent left kidney. Normal right kidney.    BLADDER: Within normal limits.  REPRODUCTIVE ORGANS: Redemonstrated multiple cystic and solid pelvic masses, highly suspicious for malignancy.    BOWEL: Since prior study, there is focal dilatation of a proximal jejunal loop with gradual transition to underdistended small bowel in the mid abdomen (series 3, image 107). Upstream duodenum and stomach is nondistended.  PERITONEUM: Small volume abdominopelvic ascites, increased from trace to small on 2020 with persistent infiltration of the omental fat.  VESSELS: Within normal limits.  RETROPERITONEUM/LYMPH NODES: No lymphadenopathy.  ABDOMINAL WALL: A tiny fat-containing umbilical hernia.  BONES: Degenerative changes.    IMPRESSION:  Limited noncontrast study.    Redemonstrated multiple cystic and solid pelvic masses, highly suspicious for malignancy. Increased, now small, abdominopelvic ascites with persistent infiltration of the omental fat.    Focal dilatation of a proximal jejunal loop with gradual transition in the mid abdomen, possibly early bowel obstruction. Correlate clinically. No distention of the proximal duodenum or stomach.    Findings were discussed with ZOEY Miller 2020 11:42 AM by Dr. Benton with repeat back confirmation.              PRADIP SERVIN M.D., RADIOLOGY RESIDENT  This document has been electronically signed.  ANN MARIE BENTON M.D., ATTENDING RADIOLOGIST  This document has been electronically signed. Sep  9 2020 11:43AM                < end of copied text >

## 2020-09-10 NOTE — CONSULT NOTE ADULT - PROBLEM SELECTOR RECOMMENDATION 9
Orders per primary surgery team   Stable from cardiac perspective. Would be acceptable cardiac risk for surgical intervention

## 2020-09-10 NOTE — CONSULT NOTE ADULT - ASSESSMENT
45y F with complex multiseptated pelvic mass with associated peritoneal carcinomatosis, suspicious for possible ovarian malignancy. ( 211,  434, CEA <0.6). CT chest without evidence of metastatic disease. No signs of bowel obstruction, however previously with nausea/vomiting and imaging concerning for early SBO.    - Continue CLD / NPO pMN for Colonoscopy tomorrow  - Will be available for operative assistance as needed  - Will follow    Please call with any questions or concerns.    Discussed with attending.    Isabela Harris PGY2  A Team Surgery [id number]

## 2020-09-10 NOTE — CONSULT NOTE ADULT - ATTENDING COMMENTS
Patient seen and examined  Labs and imaging reviewed  Plan for colonoscopy tomorrow  Will be available for OR
Advanced care planning was discussed with patient and family.  Advanced care planning forms were reviewed and discussed as appropriate.  Differential diagnosis and plan of care discussed with patient after the evaluation.   Pain assessed and judicious use of narcotics when appropriate was discussed.  Importance of Fall prevention discussed.  Counseling on Smoking and Alcohol cessation was offered when appropriate.  Counseling on Diet, exercise, and medication compliance was done.

## 2020-09-10 NOTE — H&P ADULT - NSHPPHYSICALEXAM_GEN_ALL_CORE
Physical Exam:   General: sitting comfortably in bed, NAD   CV: tachycardic, regular rhythm  Lungs: CTAB  Abd: moderately distended, non focally tender, no rebound/guarding  : patient refused secondary to abdominal discomfort    Ext: non-tender b/l, no edema

## 2020-09-10 NOTE — CONSULT NOTE ADULT - SUBJECTIVE AND OBJECTIVE BOX
CHIEF COMPLAINT:      HISTORY OF PRESENT ILLNESS: 46 y/o G0, LMP 96/20, admitted with abdominal pain. Patient s/p ED visit 9/4 with same complaint. Found to have large complex multiseptated pelvic mass with possible peritoneal carcinomatosis at that time concerning for ovarian malignancy. GYN consulted, recommended tumor markers and outpatient follow up with GYN ONC. Patient admitted today for persistant abdominal pain. Patient reports that pain since ED visit has continued and has not responded to Tylenol and Oxy. Patient not taking NSAIDs as she is allergic. Patient also endorses nausea with infrequent vomiting. Unable to tolerating full PO secondary to pain and decreased appetite. Also reports not passing gas or having a bowel movement since three days ago. Normally patient has a bowel movement daily. Reports small caliber stools. Denies bloody stool, tarry stools. Denies fevers, chills, urinary symptoms. Endorses some shortness of breath when laying flat. Denies mood symptoms currently.     Interval Events  Pt was transferred to [facility] for pre-op workup as well as possible OR procedure. At this time patient continues to feel nauseous but has not vomited since her transfer her. Currently states pain is well controlled. Denies any headaches, fevers, chills, CP or SOB.        PAST MEDICAL & SURGICAL HISTORY:  H/O pulmonary valve stenosis  Heart murmur  Anxiety  Paranoid schizophrenia  No significant past surgical history          MEDICATIONS:  enoxaparin Injectable 40 milliGRAM(s) SubCutaneous daily        acetaminophen   Tablet .. 650 milliGRAM(s) Oral every 6 hours PRN  FLUoxetine 40 milliGRAM(s) Oral daily  morphine  - Injectable 2 milliGRAM(s) IV Push every 4 hours PRN  ondansetron Injectable 4 milliGRAM(s) IV Push every 6 hours  risperiDONE   Tablet 1 milliGRAM(s) Oral daily        dextrose 5% + lactated ringers. 1000 milliLiter(s) IV Continuous <Continuous>      FAMILY HISTORY:  FH: skin cancer  FH: diabetes mellitus      SOCIAL HISTORY:    [ ] Non-smoker  [ ] Smoker  [ ] Alcohol    Allergies    ibuprofen (Other)    Intolerances    	    REVIEW OF SYSTEMS:  CONSTITUTIONAL: No fever, weight loss,+ fatigue  EYES: No eye pain, visual disturbances, or discharge  ENMT:  No difficulty hearing, tinnitus, vertigo; No sinus or throat pain  NECK: No pain or stiffness  RESPIRATORY: No cough, wheezing, chills or hemoptysis; No Shortness of Breath  CARDIOVASCULAR: No chest pain, palpitations, passing out, dizziness, or leg swelling  GASTROINTESTINAL:+ abdominal or epigastric pain. No nausea, vomiting, or hematemesis; No diarrhea or constipation. No melena or hematochezia.  GENITOURINARY: No dysuria, frequency, hematuria, or incontinence  NEUROLOGICAL: No headaches, memory loss, loss of strength, numbness, or tremors  SKIN: No itching, burning, rashes, or lesions   LYMPH Nodes: No enlarged glands  ENDOCRINE: No heat or cold intolerance; No hair loss  MUSCULOSKELETAL: No joint pain or swelling; No muscle, back, or extremity pain  PSYCHIATRIC: No depression, anxiety, mood swings, or difficulty sleeping  HEME/LYMPH: No easy bruising, or bleeding gums  ALLERY AND IMMUNOLOGIC: No hives or eczema	    [ ] All others negative	  [ ] Unable to obtain    PHYSICAL EXAM:  T(C): 36.4 (09-10-20 @ 10:12), Max: 37 (09-10-20 @ 06:00)  HR: 104 (09-10-20 @ 10:12) (80 - 115)  BP: 138/79 (09-10-20 @ 10:12) (133/65 - 141/85)  RR: 18 (09-10-20 @ 10:12) (18 - 22)  SpO2: 92% (09-10-20 @ 10:12) (92% - 97%)  Wt(kg): --  I&O's Summary    09 Sep 2020 07:01  -  10 Sep 2020 07:00  --------------------------------------------------------  IN: 300 mL / OUT: 400 mL / NET: -100 mL    10 Sep 2020 07:01  -  10 Sep 2020 11:55  --------------------------------------------------------  IN: 0 mL / OUT: 250 mL / NET: -250 mL        Appearance: Uncomfortable appearing 	  HEENT:   Dry  oral mucosa, PERRL, EOMI	  Lymphatic: No lymphadenopathy  Cardiovascular: tachy S1 S2, No JVD,+ murmurs, No edema  Respiratory: Lungs clear to auscultation	  Psychiatry: A & O x 3, Mood & affect appropriate  Gastrointestinal:  Soft, Non-tender, + BS	  Skin: No rashes, No ecchymoses, No cyanosis	  Neurologic: Non-focal  Extremities: Normal range of motion, No clubbing, cyanosis or edema  Vascular: Peripheral pulses palpable 2+ bilaterally    TELEMETRY: 	    ECG:  	  < from: Transthoracic Echocardiogram (09.10.20 @ 10:43) >    Patient name: REBECCA CALL  YOB: 1975   Age: 45 (F)   MR#: [id number]  Study Date: 9/10/2020  Location: [id number]: Rachana France RDCS  Study quality: Technically Fair  Referring Physician: Shana Horowitz MD  Blood Pressure: 138/79 mmHg  Height: 170 cm  Weight: 91 kg  BSA: 2 m2  ------------------------------------------------------------------------  PROCEDURE: Transthoracic echocardiogram with 2-D, M-Mode  and complete spectral and color flow Doppler.  INDICATION: Abnormal electrocardiogram (ECG) (EKG) (R94.31)  ------------------------------------------------------------------------  DIMENSIONS:  Dimensions:     Normal Values:  LA:     2.8 cm    2.0 - 4.0 cm  Ao:     3.3 cm    2.0 - 3.8 cm  SEPTUM: 0.8cm    0.6 - 1.2 cm  PWT:    0.9 cm    0.6 - 1.1 cm  LVIDd:  4.8 cm    3.0 - 5.6 cm  LVIDs:  3.0 cm    1.8 - 4.0 cm  Derived Variables:  LVMI: 68 g/m2  RWT: 0.37  Fractional short: 38 %  Ejection Fraction (Teicholtz): 67 %  ------------------------------------------------------------------------  OBSERVATIONS:  Mitral Valve: Normal mitral valve. Minimal mitral  regurgitation.  Aortic Root: Normal aortic root.  Aortic Valve: Normal trileaflet aortic valve.  Left Atrium: Normal left atrium.  Left Ventricle: Normal left ventricular systolic function.  No segmental wall motion abnormalities. Normal left  ventricular internal dimensions and wall thicknesses.  Normal left ventricular diastolic function.  Right Heart: Normal right atrium. The right ventricle is  not well visualized; grossly normal right ventricular  systolic function. Normal tricuspid valve.  Minimal  tricuspid regurgitation. Normal pulmonic valve.  Mild-moderate pulmonic regurgitation.  Pericardium/PleuraNormal pericardium with no pericardial  effusion.  ------------------------------------------------------------------------  CONCLUSIONS:  1. Normal mitral valve. Minimal mitral regurgitation.  2. Normal left ventricular internal dimensions and wall  thicknesses.  3. Normal left ventricular systolic function. No segmental  wall motion abnormalities.  4. Normal left ventricular diastolic function.  5. The right ventricle is not well visualized; grossly  normal right ventricular systolic function.  ------------------------------------------------------------------------  Confirmed on  9/10/2020 - 11:50:46 by Kehinde Vargas M.D.  ------------------------------------------------------------------------    < end of copied text >    RADIOLOGY:  < from: CT Abdomen and Pelvis No Cont (09.09.20 @ 09:52) >    EXAM:  CT ABDOMEN AND PELVIS                            PROCEDURE DATE:  09/09/2020            INTERPRETATION:  CLINICAL INFORMATION: Abdominal distention/vomiting. Pelvic mass.    COMPARISON: CT abdomen and pelvis 9/4/2020. Ultrasound pelvis 9/4/2020.    PROCEDURE:  CT of the Abdomen and Pelvis was performed without intravenous contrast.  Intravenous contrast: None.  Oral contrast: None.  Sagittal and coronal reformats were performed.    FINDINGS:    Evaluation of the parenchymal organs and vascular structures is limited without intravenous contrast.    LOWER CHEST: Bibasilar dependent consolidations, likely subsegmental atelectasis.    LIVER: Within normal limits.  BILE DUCTS: Normal caliber.  GALLBLADDER: Vicarious excretion of contrast from prior study.  SPLEEN: Within normal limits.  PANCREAS: Within normal limits.  ADRENALS: Within normal limits.  KIDNEYS/URETERS: Absent left kidney. Normal right kidney.    BLADDER: Within normal limits.  REPRODUCTIVE ORGANS: Redemonstrated multiple cystic and solid pelvic masses, highly suspicious for malignancy.    BOWEL: Since prior study, there is focal dilatation of a proximal jejunal loop with gradual transition to underdistended small bowel in the mid abdomen (series 3, image 107). Upstream duodenum and stomach is nondistended.  PERITONEUM: Small volume abdominopelvic ascites, increased from trace to small on 09/04/2020 with persistent infiltration of the omental fat.  VESSELS: Within normal limits.  RETROPERITONEUM/LYMPH NODES: No lymphadenopathy.  ABDOMINAL WALL: A tiny fat-containing umbilical hernia.  BONES: Degenerative changes.    IMPRESSION:  Limited noncontrast study.    Redemonstrated multiple cystic and solid pelvic masses, highly suspicious for malignancy. Increased, now small, abdominopelvic ascites with persistent infiltration of the omental fat.    Focal dilatation of a proximal jejunal loop with gradual transition in the mid abdomen, possibly early bowel obstruction. Correlate clinically. No distention of the proximal duodenum or stomach.    Findings were discussed with ZOEY Miller 9/9/2020 11:42 AM by Dr. Benton with repeat back confirmation.              PRADIP SERVIN M.D., RADIOLOGY RESIDENT  This document has been electronically signed.  ANN MARIE BENTON M.D., ATTENDING RADIOLOGIST  This document has been electronically signed. Sep  9 2020 11:43AM                < end of copied text >< from: Xray Abdomen 1 View PORTABLE -Urgent (09.08.20 @ 23:31) >    EXAM:  XR ABDOMEN PORTABLE URGENT 1V                            PROCEDURE DATE:  09/08/2020            INTERPRETATION:  CLINICAL INFORMATION: Nausea, vomiting, abdominal distention.    TECHNIQUE: AP view abdominal x-ray.    COMPARISON: Abdominal pelvis CT 9/4/2020    Impression:    Prominent air dilated single loop of proximal small bowel in dilated stomach. Some gas is seen within the right colon. Findings could represent focal ileus or obstruction. Correlate with CT examination.  Bowel gas and stool are identified throughout the colon and rectum.  The visualized osseous structures are unremarkable for age.                SB CORDOVA M.D., RADIOLOGY RESIDENT  This document has been electronically signed.  LYLY CLARK M.D., ATTENDING RADIOLOGIST  This document has been electronically signed. Sep 10 2020  8:16AM                < end of copied text >      OTHER: 	  	  LABS:	 	    CARDIAC MARKERS:                                  10.2   11.98 )-----------( 384      ( 10 Sep 2020 05:30 )             33.2     09-10    143  |  105  |  16  ----------------------------<  133<H>  4.2   |  26  |  0.57    Ca    8.4      10 Sep 2020 05:30  Phos  3.4     09-10  Mg     2.4     09-10    TPro  6.7  /  Alb  3.3  /  TBili  0.4  /  DBili  x   /  AST  14  /  ALT  10  /  AlkPhos  88  09-09    proBNP:   Lipid Profile:   HgA1c:   TSH:

## 2020-09-10 NOTE — PROGRESS NOTE ADULT - ASSESSMENT
45y female admitted as transfer from [facility] after presenting with partial SBO in the context of a known complex multiseptated pelvic mass with associated peritoneal carcinomatosis, suspicious for possible ovarian malignancy. ( 211,  434, CEA <0.6). CT chest without evidence of metastatic disease. Patient with persistent tachycardia, although otherwise stable vitals. Continues to endorse diffuse abdominal tenderness, although reports passing a bowel movement yesterday. 45y female admitted as transfer from [facility] after presenting with partial SBO in the context of a known complex multiseptated pelvic mass with associated peritoneal carcinomatosis, suspicious for possible ovarian malignancy. ( 211,  434, CEA <0.6). CT chest without evidence of metastatic disease. Patient with persistent tachycardia, although otherwise stable vitals. Continues to endorse diffuse abdominal tenderness, although reports passing a bowel movement 9/9.

## 2020-09-10 NOTE — H&P ADULT - PROBLEM SELECTOR PLAN 1
Neuro: PO Tylenol and Morphine for pain PRN  - cw Risperdal and Prozac for hx of schizophrenia  CV: Hemodynamically stable  - AM CBC and T&S  - Stat TTE for pulmonary valve stenosis assessment.  Pulm: Saturating well on room air, encourage incentive spirometry  GI: NPO  : voiding spontaneously. Strict I&Os  Heme: c/w HSQ and SCDs for DVT ppx  FEN: D5LR @75. AM BMP/Mg/Ph    Orville Moreira PGY2

## 2020-09-11 ENCOUNTER — TRANSCRIPTION ENCOUNTER (OUTPATIENT)
Age: 45
End: 2020-09-11

## 2020-09-11 ENCOUNTER — RESULT REVIEW (OUTPATIENT)
Age: 45
End: 2020-09-11

## 2020-09-11 DIAGNOSIS — F32.3 MAJOR DEPRESSIVE DISORDER, SINGLE EPISODE, SEVERE WITH PSYCHOTIC FEATURES: ICD-10-CM

## 2020-09-11 LAB
ANION GAP SERPL CALC-SCNC: 11 MMO/L — SIGNIFICANT CHANGE UP (ref 7–14)
ANISOCYTOSIS BLD QL: SLIGHT — SIGNIFICANT CHANGE UP
BASOPHILS # BLD AUTO: 0.04 K/UL — SIGNIFICANT CHANGE UP (ref 0–0.2)
BASOPHILS NFR BLD AUTO: 0.4 % — SIGNIFICANT CHANGE UP (ref 0–2)
BASOPHILS NFR SPEC: 0 % — SIGNIFICANT CHANGE UP (ref 0–2)
BLASTS # FLD: 0 % — SIGNIFICANT CHANGE UP (ref 0–0)
BUN SERPL-MCNC: 13 MG/DL — SIGNIFICANT CHANGE UP (ref 7–23)
CALCIUM SERPL-MCNC: 8.6 MG/DL — SIGNIFICANT CHANGE UP (ref 8.4–10.5)
CHLORIDE SERPL-SCNC: 108 MMOL/L — HIGH (ref 98–107)
CO2 SERPL-SCNC: 24 MMOL/L — SIGNIFICANT CHANGE UP (ref 22–31)
CREAT SERPL-MCNC: 0.52 MG/DL — SIGNIFICANT CHANGE UP (ref 0.5–1.3)
ELLIPTOCYTES BLD QL SMEAR: SLIGHT — SIGNIFICANT CHANGE UP
EOSINOPHIL # BLD AUTO: 0.2 K/UL — SIGNIFICANT CHANGE UP (ref 0–0.5)
EOSINOPHIL NFR BLD AUTO: 1.8 % — SIGNIFICANT CHANGE UP (ref 0–6)
EOSINOPHIL NFR FLD: 1.7 % — SIGNIFICANT CHANGE UP (ref 0–6)
GIANT PLATELETS BLD QL SMEAR: PRESENT — SIGNIFICANT CHANGE UP
GLUCOSE SERPL-MCNC: 120 MG/DL — HIGH (ref 70–99)
HCT VFR BLD CALC: 33.5 % — LOW (ref 34.5–45)
HGB BLD-MCNC: 10.3 G/DL — LOW (ref 11.5–15.5)
IMM GRANULOCYTES NFR BLD AUTO: 4.3 % — HIGH (ref 0–1.5)
LYMPHOCYTES # BLD AUTO: 0.92 K/UL — LOW (ref 1–3.3)
LYMPHOCYTES # BLD AUTO: 8.1 % — LOW (ref 13–44)
LYMPHOCYTES NFR SPEC AUTO: 6.9 % — LOW (ref 13–44)
MACROCYTES BLD QL: SLIGHT — SIGNIFICANT CHANGE UP
MAGNESIUM SERPL-MCNC: 2.3 MG/DL — SIGNIFICANT CHANGE UP (ref 1.6–2.6)
MCHC RBC-ENTMCNC: 28.1 PG — SIGNIFICANT CHANGE UP (ref 27–34)
MCHC RBC-ENTMCNC: 30.7 % — LOW (ref 32–36)
MCV RBC AUTO: 91.3 FL — SIGNIFICANT CHANGE UP (ref 80–100)
METAMYELOCYTES # FLD: 0.9 % — SIGNIFICANT CHANGE UP (ref 0–1)
MONOCYTES # BLD AUTO: 0.87 K/UL — SIGNIFICANT CHANGE UP (ref 0–0.9)
MONOCYTES NFR BLD AUTO: 7.7 % — SIGNIFICANT CHANGE UP (ref 2–14)
MONOCYTES NFR BLD: 3.5 % — SIGNIFICANT CHANGE UP (ref 2–9)
MYELOCYTES NFR BLD: 0 % — SIGNIFICANT CHANGE UP (ref 0–0)
NEUTROPHIL AB SER-ACNC: 86.1 % — HIGH (ref 43–77)
NEUTROPHILS # BLD AUTO: 8.78 K/UL — HIGH (ref 1.8–7.4)
NEUTROPHILS NFR BLD AUTO: 77.7 % — HIGH (ref 43–77)
NEUTS BAND # BLD: 0 % — SIGNIFICANT CHANGE UP (ref 0–6)
NRBC # FLD: 0 K/UL — SIGNIFICANT CHANGE UP (ref 0–0)
OTHER - HEMATOLOGY %: 0 — SIGNIFICANT CHANGE UP
OVALOCYTES BLD QL SMEAR: SLIGHT — SIGNIFICANT CHANGE UP
PHOSPHATE SERPL-MCNC: 4 MG/DL — SIGNIFICANT CHANGE UP (ref 2.5–4.5)
PLATELET # BLD AUTO: 400 K/UL — SIGNIFICANT CHANGE UP (ref 150–400)
PLATELET COUNT - ESTIMATE: NORMAL — SIGNIFICANT CHANGE UP
PMV BLD: 9.8 FL — SIGNIFICANT CHANGE UP (ref 7–13)
POIKILOCYTOSIS BLD QL AUTO: SLIGHT — SIGNIFICANT CHANGE UP
POLYCHROMASIA BLD QL SMEAR: SLIGHT — SIGNIFICANT CHANGE UP
POTASSIUM SERPL-MCNC: 4.1 MMOL/L — SIGNIFICANT CHANGE UP (ref 3.5–5.3)
POTASSIUM SERPL-SCNC: 4.1 MMOL/L — SIGNIFICANT CHANGE UP (ref 3.5–5.3)
PROMYELOCYTES # FLD: 0 % — SIGNIFICANT CHANGE UP (ref 0–0)
RBC # BLD: 3.67 M/UL — LOW (ref 3.8–5.2)
RBC # FLD: 14 % — SIGNIFICANT CHANGE UP (ref 10.3–14.5)
SODIUM SERPL-SCNC: 143 MMOL/L — SIGNIFICANT CHANGE UP (ref 135–145)
VARIANT LYMPHS # BLD: 0 % — SIGNIFICANT CHANGE UP
WBC # BLD: 11.3 K/UL — HIGH (ref 3.8–10.5)
WBC # FLD AUTO: 11.3 K/UL — HIGH (ref 3.8–10.5)

## 2020-09-11 PROCEDURE — 88305 TISSUE EXAM BY PATHOLOGIST: CPT | Mod: 26

## 2020-09-11 PROCEDURE — 88312 SPECIAL STAINS GROUP 1: CPT | Mod: 26

## 2020-09-11 PROCEDURE — 99231 SBSQ HOSP IP/OBS SF/LOW 25: CPT

## 2020-09-11 PROCEDURE — 90792 PSYCH DIAG EVAL W/MED SRVCS: CPT

## 2020-09-11 RX ORDER — PANTOPRAZOLE SODIUM 20 MG/1
40 TABLET, DELAYED RELEASE ORAL
Refills: 0 | Status: DISCONTINUED | OUTPATIENT
Start: 2020-09-11 | End: 2020-09-19

## 2020-09-11 RX ORDER — SODIUM CHLORIDE 9 MG/ML
1000 INJECTION INTRAMUSCULAR; INTRAVENOUS; SUBCUTANEOUS
Refills: 0 | Status: DISCONTINUED | OUTPATIENT
Start: 2020-09-11 | End: 2020-09-11

## 2020-09-11 RX ADMIN — ONDANSETRON 4 MILLIGRAM(S): 8 TABLET, FILM COATED ORAL at 05:29

## 2020-09-11 RX ADMIN — RISPERIDONE 1 MILLIGRAM(S): 4 TABLET ORAL at 12:33

## 2020-09-11 RX ADMIN — ONDANSETRON 4 MILLIGRAM(S): 8 TABLET, FILM COATED ORAL at 18:03

## 2020-09-11 RX ADMIN — Medication 40 MILLIGRAM(S): at 12:33

## 2020-09-11 RX ADMIN — ONDANSETRON 4 MILLIGRAM(S): 8 TABLET, FILM COATED ORAL at 12:33

## 2020-09-11 RX ADMIN — ENOXAPARIN SODIUM 40 MILLIGRAM(S): 100 INJECTION SUBCUTANEOUS at 18:03

## 2020-09-11 NOTE — BEHAVIORAL HEALTH ASSESSMENT NOTE - SUMMARY
Patient is a 45 year old woman, single, domiciled with mother, employed part-time, with a PPH of schizoaffective vs MDD with psychotic features, 1 prior psychiatric hospitalization 24 years ago for a suicide attempt, stable on medications (risperidone and prozac), in treatment with outpatient physician Dr. Watkins, no history of violence or legal issues, no history of substance abuse, PMH of pulmonic stenosis, currently admitted for possible ovarian malignancy, psychiatry consulted because patient has a history of psychiatric symptoms.     On interview patient describes a long history of stable psychiatric symptoms, with consistent outpatient treatment and good medication compliance. Patient denies depressive or psychotic symptoms currently, was cooperative with interview, with good insight and judgment. Patient is a 45 year old woman, single, domiciled with mother, employed part-time, with a PPH of schizoaffective vs MDD with psychotic features, 1 prior psychiatric hospitalization 24 years ago for a suicide attempt, stable on medications (risperidone and prozac), in treatment with outpatient physician Dr. Watkins, no history of violence or legal issues, no history of substance abuse, PMH of pulmonic stenosis, currently admitted for possible ovarian malignancy, psychiatry consulted because patient has a history of psychiatric symptoms.     On interview patient describes a long history of stable psychiatric symptoms, with consistent outpatient treatment and good medication compliance. Patient denies depressive or psychotic symptoms currently, was cooperative with interview, with good insight and judgment. No SI/HI or safety issues noted.

## 2020-09-11 NOTE — PROGRESS NOTE ADULT - SUBJECTIVE AND OBJECTIVE BOX
HD#2    INTERVAL HPI/OVERNIGHT EVENTS: Pt seen and examined at bedside.  Pt able to tolerate bowel prep overnight without nausea and vomiting. Pt notes multiple bowel movements and passing flatus.  Ambulating, NPO for endoscopy/colonoscopy today, pain controlled with analgesia, urinating spontaneously  She denies fever/chills/chest pain/SOB/dizziness.    MEDICATIONS  (STANDING):  dextrose 5% + lactated ringers. 1000 milliLiter(s) (75 mL/Hr) IV Continuous <Continuous>  enoxaparin Injectable 40 milliGRAM(s) SubCutaneous daily  FLUoxetine 40 milliGRAM(s) Oral daily  ondansetron Injectable 4 milliGRAM(s) IV Push every 6 hours  risperiDONE   Tablet 1 milliGRAM(s) Oral daily    MEDICATIONS  (PRN):  acetaminophen   Tablet .. 650 milliGRAM(s) Oral every 6 hours PRN Mild Pain (1 - 3)  morphine  - Injectable 2 milliGRAM(s) IV Push every 4 hours PRN Moderate Pain (4 - 6)      12 point ROS negative except as outlined above    Vital Signs Last 24 Hrs  T(C): 37.1 (11 Sep 2020 05:34), Max: 37.1 (11 Sep 2020 05:34)  T(F): 98.7 (11 Sep 2020 05:34), Max: 98.7 (11 Sep 2020 05:34)  HR: 84 (11 Sep 2020 05:34) (84 - 104)  BP: 126/74 (11 Sep 2020 05:34) (126/74 - 138/79)  BP(mean): --  RR: 16 (11 Sep 2020 05:34) (16 - 18)  SpO2: 97% (11 Sep 2020 05:34) (92% - 99%)    I&O's Summary    09 Sep 2020 07:01  -  10 Sep 2020 07:00  --------------------------------------------------------  IN: 300 mL / OUT: 400 mL / NET: -100 mL    10 Sep 2020 07:01  -  11 Sep 2020 06:49  --------------------------------------------------------  IN: 2800 mL / OUT: 850 mL / NET: 1950 mL          PHYSICAL EXAM:    Constitutional: NAD, A+O x3  CV: regular rate and rhythm  Lungs: Clear to auscultation bilaterally  Abdomen: Softly distended. Minimal tenderness to palpation. No rebound tenderness or guarding. hypoactive bowel sounds.   Extremities: 1+ bilateral LE edema, no calf tenderness bilaterally; Venodynes in place  Lines:      LABS:                          10.3   11.30 )-----------( 400      ( 11 Sep 2020 05:00 )             33.5   baso 0.4    eos 1.8    imm gran 4.3    lymph 8.1    mono 7.7    poly 77.7                         10.2   11.98 )-----------( 384      ( 10 Sep 2020 05:30 )             33.2   baso x      eos x      imm gran x      lymph x      mono x      poly x                            12.0   15.86 )-----------( 443      ( 09 Sep 2020 06:42 )             38.0   baso 0.2    eos 0.1    imm gran 2.4    lymph 3.3    mono 5.2    poly 88.8

## 2020-09-11 NOTE — DISCHARGE NOTE PROVIDER - NSDCFUADDAPPT_GEN_ALL_CORE_FT
Please follow up with your psychiatrist Dr. Watkins. If you are unable to continue follow up with Dr. Watkins, please call [facility] Outpatient Psychiatry to establish care.     [facility] Outpatient Psych - [phone] Please follow up with your psychiatrist Dr. Watkins. If you are unable to continue follow up with Dr. Watkins, please call [facility] Outpatient Psychiatry to establish care.     [facility] Outpatient Psych - [phone]      Please call Dr. Segundo Turner, Surgeon, for follow up appointment in 2 weeks.

## 2020-09-11 NOTE — BEHAVIORAL HEALTH ASSESSMENT NOTE - HPI (INCLUDE ILLNESS QUALITY, SEVERITY, DURATION, TIMING, CONTEXT, MODIFYING FACTORS, ASSOCIATED SIGNS AND SYMPTOMS)
Patient is a 45 year old woman, single, domiciled with mother, employed part-time, with a PPH of schizoaffective vs MDD with psychotic features, 1 prior psychiatric hospitalization 24 years ago for a suicide attempt, stable on medications (risperidone and prozac), in treatment with outpatient physician Dr. Watkins, no history of violence or legal issues, no history of substance abuse, PMH of pulmonic stenosis, currently admitted for possible ovarian malignancy, psychiatry consulted because patient has a history of psychiatric symptoms.     On interview patient calm and cooperative, states that she is in the hospital because of an ovarian malignancy. States that her mood has been stable, denies any SI, states that when she does have psychotic symptoms they tend to be more paranoid, but denies any recent symptoms. States that she goes to see her psychiatrist once every two months and has been stable on prozac and risperidone for 25 years. She states that these medications have been slowly decreased over the years and that she has not required any other psychiatric hospitalization other than the one 24 years ago.    Patient denies any difficulty sleeping, changes in appetite, SI, HI, psychotic symptoms (hallucinations, delusions, paranoia), manic symptoms. Patient is a 45 year old woman, single, domiciled with mother, employed part-time, with a PPH of schizoaffective vs MDD with psychotic features, 1 prior psychiatric hospitalization 24 years ago for a suicide attempt, stable on medications (risperidone and prozac), in treatment with outpatient physician Dr. Watkins, no history of violence or legal issues, no history of substance abuse, PMH of pulmonic stenosis, currently admitted for possible ovarian malignancy, psychiatry consulted because patient has a distant history of psychiatric symptoms.     On interview patient calm and cooperative, states that she is in the hospital because of an ovarian malignancy. States that her mood has been stable, denies any SI, states that when she does have psychotic symptoms they tend to be more paranoid, but denies any recent symptoms. States that she goes to see her psychiatrist once every two months and has been stable on prozac and risperidone for 25 years. She states that these medications have been slowly decreased over the years and that she has not required any other psychiatric hospitalization other than the one 24 years ago.    Patient denies any difficulty sleeping, changes in appetite, SI, HI, psychotic symptoms (hallucinations, delusions, paranoia), manic symptoms.

## 2020-09-11 NOTE — BEHAVIORAL HEALTH ASSESSMENT NOTE - NSBHCHARTREVIEWINVESTIGATE_PSY_A_CORE FT
< from: 12 Lead ECG (09.08.20 @ 16:50) >      Ventricular Rate 113 BPM    Atrial Rate 113 BPM    P-R Interval 156 ms    QRS Duration 94 ms    Q-T Interval 340 ms    QTC Calculation(Bezet) 466 ms    P Axis 38 degrees    R Axis -6 degrees    T Axis 2 degrees    Diagnosis Line SINUS TACHYCARDIA  MINIMAL VOLTAGE CRITERIA FOR LVH, MAY BE NORMAL VARIANT  BORDERLINE ECG  WHEN COMPARED WITH ECG OF 08-SEP-2020 01:12,  NO SIGNIFICANT CHANGE WAS FOUND  Confirmed by MD JOSTIN, YURIDIA ([id number]) on 9/9/2020 1:05:19 PM    < end of copied text >

## 2020-09-11 NOTE — DISCHARGE NOTE PROVIDER - NSDCMRMEDTOKEN_GEN_ALL_CORE_FT
Dextrose 5% in Lactated Ringers intravenous solution:  intravenous   enoxaparin:   morphine:   ondansetron 2 mg/mL injectable solution:  injectable Dextrose 5% in Lactated Ringers intravenous solution:  intravenous   enoxaparin:   morphine:   ondansetron 2 mg/mL injectable solution:  injectable    acetaminophen 325 mg oral tablet: 2 tab(s) orally every 6 hours  Dextrose 5% in Lactated Ringers intravenous solution:  intravenous   FLUoxetine 40 mg oral capsule: 1 cap(s) orally once a day  ondansetron 2 mg/mL injectable solution:  injectable   oxyCODONE 5 mg oral tablet: 1 tab(s) orally every 6 hours MDD:no more than 4 per day  petrolatum topical ointment: 1 application topically 4 times a day, As needed, irritation  risperiDONE 1 mg oral tablet: 1 tab(s) orally once a day

## 2020-09-11 NOTE — BEHAVIORAL HEALTH ASSESSMENT NOTE - VIOLENCE PROTECTIVE FACTORS:
Employment stability/Affective Stability/Sobriety/Residential stability/Engagement in treatment/Insight into violence risk and need for management/treatment

## 2020-09-11 NOTE — DISCHARGE NOTE PROVIDER - NSDCCPTREATMENT_GEN_ALL_CORE_FT
PRINCIPAL PROCEDURE  Procedure: Total abdominal hysterectomy and bilateral salpingo-oophorectomy (ILEANA-BSO)  Findings and Treatment:       SECONDARY PROCEDURE  Procedure: Total abdominal hysterectomy and bilateral salpingo-oophorectomy (ILEANA-BSO)  Findings and Treatment:

## 2020-09-11 NOTE — BEHAVIORAL HEALTH ASSESSMENT NOTE - SUICIDE PROTECTIVE FACTORS
Positive therapeutic relationships/Ability to cope with stress/Responsibility to family and others/Has future plans/Supportive social network of family or friends/Fear of death or the actual act of killing self/Engaged in work or school/Identifies reasons for living

## 2020-09-11 NOTE — BEHAVIORAL HEALTH ASSESSMENT NOTE - NSBHCHARTREVIEWIMAGING_PSY_A_CORE FT
< from: CT Abdomen and Pelvis No Cont (09.09.20 @ 09:52) >      EXAM:  CT ABDOMEN AND PELVIS                            PROCEDURE DATE:  09/09/2020            INTERPRETATION:  CLINICAL INFORMATION: Abdominal distention/vomiting. Pelvic mass.    COMPARISON: CT abdomen and pelvis 9/4/2020. Ultrasound pelvis 9/4/2020.    PROCEDURE:  CT of the Abdomen and Pelvis was performed without intravenous contrast.  Intravenous contrast: None.  Oral contrast: None.  Sagittal and coronal reformats were performed.    IMPRESSION:  Limited noncontrast study.    Redemonstrated multiple cystic and solid pelvic masses, highly suspicious for malignancy. Increased, now small, abdominopelvic ascites with persistent infiltration of the omental fat.    Focal dilatation of a proximal jejunal loop with gradual transition in the mid abdomen, possibly early bowel obstruction. Correlate clinically. No distention of the proximal duodenum or stomach.    Findings were discussed with ZOEY Miller 9/9/2020 11:42 AM by Dr. Virgen with repeat back confirmation.              PRADIP SERVIN M.D., RADIOLOGY RESIDENT  This document has been electronically signed.  ANN MARIE VIRGEN M.D., ATTENDING RADIOLOGIST  This document has been electronically signed. Sep  9 2020 11:43AM                < end of copied text >

## 2020-09-11 NOTE — BEHAVIORAL HEALTH ASSESSMENT NOTE - RISK ASSESSMENT
Low Acute Suicide Risk Risk factors:  h/o 1 remote SA, h/o 1 remote psych admission, current medical problem     Protective factors: no current SIIP/HIIP, no recent SA/psych admissions, no access to weapons, no active substance abuse, good physical health, no psychosis, engaged in work or school, domiciled, social supports, positive therapeutic relationship, engaged in treatment, compliant with treatment, help-seeking behaviors    Overall, pt is a low risk of harm to self/others and does not require psychiatric admission for safety and stabilization.

## 2020-09-11 NOTE — DISCHARGE NOTE PROVIDER - NSFOLLOWUPCLINICS_GEN_ALL_ED_FT
[facility]  Psychiatry  [address] [place] [address]  Phone: [phone]  Fax:   Follow Up Time:          [facility]  Psychiatry  [address] [place] [address]  Phone: [phone]  Fax:   Follow Up Time:

## 2020-09-11 NOTE — PROGRESS NOTE ADULT - PROBLEM SELECTOR PLAN 1
Neuro: Continue Tylenol with Morphine PRN for pain control. Continue home Risperdal and Prozac for PMHx of paranoid schizophrenia, anxiety and depression.   CV: PMHx of Pulmonic stenosis, patient tachycardic overnight, otherwise normal vital signs. Cardiology stating that patient would be an acceptable surgical candidate from a cardia perspective. TTE (9/10) showing normal pulmonic valve and mild to moderate TR  Pulm: Saturating well on 2L NC. Increase incentive spirometry.  GI: GI recommending endoscopy/colonoscopy today. Pt NPO for procedure. Zofran q6 standing for nausea.   : Voiding spontaneously.   FEN: Continue D5LR @75cc/hr. AM BMP/Mg/Phos resulted, no need for repletion at this time  ID: No active issues.   Heme: Continue Lovenox and Venodynes for DVT ppx. Increase ambulation.     Dispo: Continue inpatient management, will consult psychiarty and holistic nurse today. NPO for endoscopy/colonoscopy today.    Kehinde Tracey MD PGY4

## 2020-09-11 NOTE — DISCHARGE NOTE PROVIDER - NSDCFUADDINST_GEN_ALL_CORE_FT
Please maintain a regular diet as tolerated, regular activity as tolerated, no heavy lifting for first 8 weeks.      Nothing per vagina: no intercourse, tampons or douching.      Call your provider if you experience fevers, chills, worsening abdominal pain, inability to urinate or worsening vaginal bleeding.    Follow up with Dr. Horowitz in 2 weeks. Please maintain a regular diet as tolerated, regular activity as tolerated, no heavy lifting for first 8 weeks.      Nothing per vagina: no intercourse, tampons or douching.      Call your provider if you experience fevers, chills, worsening abdominal pain, inability to urinate or worsening vaginal bleeding.    Follow up with Dr. Mayfield in 2 weeks.

## 2020-09-11 NOTE — PROGRESS NOTE ADULT - SUBJECTIVE AND OBJECTIVE BOX
GENERAL SURGERY DAILY PROGRESS NOTE:    Interval:  No acute events overnight.    Subjective:  Patient seen and examined. Reports pain is well controlled. Denies N/V. Passing gas and having bowel movements that are clear after finishing her bowel prep for colonoscopy today.     Vital Signs Last 24 Hrs  T(C): 36.9 (11 Sep 2020 10:28), Max: 37.1 (11 Sep 2020 05:34)  T(F): 98.5 (11 Sep 2020 10:28), Max: 98.7 (11 Sep 2020 05:34)  HR: 82 (11 Sep 2020 10:28) (82 - 98)  BP: 139/65 (11 Sep 2020 10:28) (126/74 - 139/65)  BP(mean): --  RR: 177 (11 Sep 2020 10:28) (16 - 177)  SpO2: 96% (11 Sep 2020 10:28) (92% - 99%)    Exam:  Gen: NAD, resting in bed, alert and responding appropriately  Resp: Airway patent, non-labored respirations  Abd: Soft, ND, NT, no rebound or guarding.   Ext: No edema, WWP  Neuro: AAOx3, no focal deficits    I&O's Detail    10 Sep 2020 07:01  -  11 Sep 2020 07:00  --------------------------------------------------------  IN:    dextrose 5% + lactated ringers.: 1800 mL    Oral Fluid: 1000 mL  Total IN: 2800 mL    OUT:    Voided: 850 mL  Total OUT: 850 mL    Total NET: 1950 mL          Daily     Daily     MEDICATIONS  (STANDING):  dextrose 5% + lactated ringers. 1000 milliLiter(s) (75 mL/Hr) IV Continuous <Continuous>  enoxaparin Injectable 40 milliGRAM(s) SubCutaneous daily  FLUoxetine 40 milliGRAM(s) Oral daily  ondansetron Injectable 4 milliGRAM(s) IV Push every 6 hours  risperiDONE   Tablet 1 milliGRAM(s) Oral daily    MEDICATIONS  (PRN):  acetaminophen   Tablet .. 650 milliGRAM(s) Oral every 6 hours PRN Mild Pain (1 - 3)  morphine  - Injectable 2 milliGRAM(s) IV Push every 4 hours PRN Moderate Pain (4 - 6)      LABS:                        10.3   11.30 )-----------( 400      ( 11 Sep 2020 05:00 )             33.5     09-11    143  |  108<H>  |  13  ----------------------------<  120<H>  4.1   |  24  |  0.52    Ca    8.6      11 Sep 2020 05:00  Phos  4.0     09-11  Mg     2.3     09-11

## 2020-09-11 NOTE — PROGRESS NOTE ADULT - ASSESSMENT
45y female admitted as transfer from [facility] after presenting with partial SBO in the context of a known complex multiseptated pelvic mass with associated peritoneal carcinomatosis, suspicious for possible ovarian malignancy. ( 211,  434, CEA <0.6). CT chest without evidence of metastatic disease. Abdominal tenderness improving and passing flatus and having bowel movements.

## 2020-09-11 NOTE — PROGRESS NOTE ADULT - ASSESSMENT
45y F with complex multiseptated pelvic mass with associated peritoneal carcinomatosis, suspicious for possible ovarian malignancy. ( 211,  434, CEA <0.6). CT chest without evidence of metastatic disease. No signs of bowel obstruction, however previously with nausea/vomiting and imaging concerning for early SBO. Now comfortable and no acute complaints.     - Continue CLD / NPO pMN for Colonoscopy today  - Will be available for operative assistance as needed  - Will continue to follow    Please call with any questions or concerns.       Surgery Team A  [id number]

## 2020-09-11 NOTE — BEHAVIORAL HEALTH ASSESSMENT NOTE - CASE SUMMARY
45 year old F, PPH of schizoaffective vs MDD with psychotic features, PMH of pulmonary stenosis, currently admitted for ovarian malignancy workup. Patient calm and cooperative, no psychiatric symptoms noted on exam or by staff/primary team. Has been stable on current meds for >25y. Continue with current meds, no further role for medication optimization. Patient aware of cancer workup and implications. No SI/HI noted.

## 2020-09-11 NOTE — DISCHARGE NOTE PROVIDER - NSDCCPCAREPLAN_GEN_ALL_CORE_FT
PRINCIPAL DISCHARGE DIAGNOSIS  Diagnosis: Postoperative state  Assessment and Plan of Treatment: Postoperative state      SECONDARY DISCHARGE DIAGNOSES  Diagnosis: Pelvic mass  Assessment and Plan of Treatment: Pelvic mass

## 2020-09-11 NOTE — BEHAVIORAL HEALTH ASSESSMENT NOTE - NSBHCHARTREVIEWLAB_PSY_A_CORE FT
10.3   11.30 )-----------( 400      ( 11 Sep 2020 05:00 )             33.5     09-11    143  |  108<H>  |  13  ----------------------------<  120<H>  4.1   |  24  |  0.52    Ca    8.6      11 Sep 2020 05:00  Phos  4.0     09-11  Mg     2.3     09-11

## 2020-09-11 NOTE — DISCHARGE NOTE PROVIDER - CARE PROVIDERS DIRECT ADDRESSES
[email or web address]

## 2020-09-11 NOTE — DISCHARGE NOTE PROVIDER - CARE PROVIDER_API CALL
Shana Horowitz)  Gynecologic Oncology; Obstetrics and Gynecology  [address] [place] [address]  Phone: [phone]  Fax: [phone]  Follow Up Time: Barbara Mayfield)  Gynecologic Oncology; Obstetrics and Gynecology  [address] [place] [address]  Phone: [phone]  Fax: [phone]  Follow Up Time: 2 weeks

## 2020-09-11 NOTE — BEHAVIORAL HEALTH ASSESSMENT NOTE - NSBHCHARTREVIEWVS_PSY_A_CORE FT
Vital Signs Last 24 Hrs  T(C): 37.1 (11 Sep 2020 05:34), Max: 37.1 (11 Sep 2020 05:34)  T(F): 98.7 (11 Sep 2020 05:34), Max: 98.7 (11 Sep 2020 05:34)  HR: 84 (11 Sep 2020 05:34) (84 - 98)  BP: 126/74 (11 Sep 2020 05:34) (126/74 - 133/66)  BP(mean): --  RR: 16 (11 Sep 2020 05:34) (16 - 18)  SpO2: 97% (11 Sep 2020 05:34) (92% - 99%)

## 2020-09-11 NOTE — PROGRESS NOTE ADULT - SUBJECTIVE AND OBJECTIVE BOX
Subjective: Patient seen and examined. No new events except as noted.    pain is well controlled. Denies N/V. Passing gas and having bowel movements that are clear after finishing her bowel prep for colonoscopy today  REVIEW OF SYSTEMS:    CONSTITUTIONAL: + weakness, fevers or chills  EYES/ENT: No visual changes;  No vertigo or throat pain   NECK: No pain or stiffness  RESPIRATORY: No cough, wheezing, hemoptysis; No shortness of breath  CARDIOVASCULAR: No chest pain or palpitations  GASTROINTESTINAL: No abdominal or epigastric pain. No nausea, vomiting, or hematemesis; No diarrhea or constipation. No melena or hematochezia.  GENITOURINARY: No dysuria, frequency or hematuria  NEUROLOGICAL: No numbness or weakness  SKIN: No itching, burning, rashes, or lesions   All other review of systems is negative unless indicated above.    MEDICATIONS:  MEDICATIONS  (STANDING):  dextrose 5% + lactated ringers. 1000 milliLiter(s) (75 mL/Hr) IV Continuous <Continuous>  enoxaparin Injectable 40 milliGRAM(s) SubCutaneous daily  FLUoxetine 40 milliGRAM(s) Oral daily  ondansetron Injectable 4 milliGRAM(s) IV Push every 6 hours  risperiDONE   Tablet 1 milliGRAM(s) Oral daily      PHYSICAL EXAM:  T(C): 37.2 (09-11-20 @ 13:19), Max: 37.2 (09-11-20 @ 13:19)  HR: 103 (09-11-20 @ 13:19) (82 - 103)  BP: 124/88 (09-11-20 @ 13:19) (124/88 - 139/65)  RR: 17 (09-11-20 @ 13:19) (16 - 18)  SpO2: 98% (09-11-20 @ 13:19) (92% - 99%)  Wt(kg): --  I&O's Summary    10 Sep 2020 07:01  -  11 Sep 2020 07:00  --------------------------------------------------------  IN: 2800 mL / OUT: 850 mL / NET: 1950 mL          Appearance: Normal	  HEENT:   Normal oral mucosa, PERRL, EOMI	  Lymphatic: No lymphadenopathy , no edema  Cardiovascular: Normal S1 S2, No JVD, No murmurs , Peripheral pulses palpable 2+ bilaterally  Respiratory: Lungs clear to auscultation, normal effort 	  Gastrointestinal:  Soft, Non-tender, + BS	  Skin: No rashes, No ecchymoses, No cyanosis, warm to touch  Musculoskeletal: Normal range of motion, normal strength  Psychiatry:  Mood & affect appropriate  Ext: No edema      LABS:    CARDIAC MARKERS:                                10.3   11.30 )-----------( 400      ( 11 Sep 2020 05:00 )             33.5     09-11    143  |  108<H>  |  13  ----------------------------<  120<H>  4.1   |  24  |  0.52    Ca    8.6      11 Sep 2020 05:00  Phos  4.0     09-11  Mg     2.3     09-11      proBNP:   Lipid Profile:   HgA1c:   TSH:             TELEMETRY: 	    ECG:  	  RADIOLOGY:   DIAGNOSTIC TESTING:  [ ] Echocardiogram:  [ ]  Catheterization:  [ ] Stress Test:    OTHER:

## 2020-09-11 NOTE — DISCHARGE NOTE PROVIDER - HOSPITAL COURSE
44y/o female with PMHx of paranoid schizophrenia, anxiety, depression and pulmonic stenosis who was transferred to [facility] after admission to [facility] with abdominal pain and concern of possible early small bowel obstruction. Of note, patient had initially presented to the [facility] ED with similar symptoms on 9/4, at which point CTAP identified a large, complex, multiseptated pelvic mass with possible peritoneal carcinomatosis suspicious for possible ovarian malignancy. Tumor markers obtained returned with an elevated  (211) and CA 19-9 (434).        The patient was transferred to the GYN Oncology service at [facility] for surgical planning. Upon her arrival at [facility], the patient was kept NPO for concern of possible SBO. She did not have any episodes of nausea or emesis upon transfer to [facility].  Cardiology was consulted, as was gastroenterology for possible colonoscopy - the patient was added onto their schedule for a colonoscopy to take place 9/11/2020. She underwent a TTE per recommendations from the [facility] Cardiology team, which was within normal limits and demonstrated a normal pulmonic valve.  The patient was cleared by Cardiology for surgical intervention.  Overnight HD#1-2 the patient tolerated Moviprep without nausea or emesis, and passed bowel movements.  On HD#2, psychology and holistic medicine were consulted. The patient underwent scheduled colonoscopy with GI, which demonstrated _________. 46y/o female with PMHx of paranoid schizophrenia, anxiety, depression and pulmonic stenosis who was transferred to [facility] after admission to [facility] with abdominal pain and concern of possible early small bowel obstruction. Of note, patient had initially presented to the [facility] ED with similar symptoms on 9/4, at which point CTAP identified a large, complex, multiseptated pelvic mass with possible peritoneal carcinomatosis suspicious for possible ovarian malignancy. Tumor markers obtained returned with an elevated  (211) and CA 19-9 (434).    The patient was transferred to the GYN Oncology service at [facility] for surgical planning. Upon her arrival at [facility], the patient was kept NPO for concern of possible SBO. She did not have any episodes of nausea or emesis upon transfer to [facility].  Cardiology was consulted, as was gastroenterology for possible colonoscopy - the patient was added onto their schedule for a colonoscopy to take place 9/11/2020. She underwent a TTE per recommendations from the [facility] Cardiology team, which was within normal limits and demonstrated a normal pulmonic valve.  The patient was cleared by Cardiology for surgical intervention.  Overnight HD#1-2 the patient tolerated Moviprep without nausea or emesis, and passed bowel movements.  On HD#2, psychology and holistic medicine were consulted. The patient underwent scheduled colonoscopy with GI, which demonstrated 3mm polyp in descending colon and was otherwise unremarkable, as well as an EGD which demonstrated esophagitis, gastritis which was biopsied, and few gastric polyps which were biopsied. Biopsy results were hyperplastic colon polyp, chronic gastritis, and fundal gland polyp, no H pylori. GI recommended protonix daily x1 month and a follow up colonoscopy in 5 years.      Patient underwent ILEANA, BSO, appendectomy 9/15. Operative findings detailed in brief op note. Intra-op pathology demonstrated endometriosis. EBL was 300 and surgery was otherwise uncomplicated. Post-operatively, patient with intermittent tachycardia up to , EKG demonstrated sinus tach. Post-op course also significant for nausea necessitating that patient's diet was advanced slowly, patient advanced to full diet on POD#3. Post-op patient endorsing depressed mood and suicidal ideation. Patient subsequently placed on 1:1 observation. Patient evaluated by psychiatry and they recommended continuing with pt's home psychiatric medications and Risperdone PRN for agitation but stated that patient did not necessitate inpatient psychiatric care or 1:1 observation. Patient otherwise remained hemodynamically stable and recovered well. At time of discharge vital signs were stable, patient was voiding, ambulating independently, pain was well controlled, and patient was tolerating a regular diet.     Patient to follow up with Dr. Mayfield postoperatively.     Post-op labs:              7.3    10.55 )-----------( 430      ( 09-18 @ 06:30 )             23.8                8.2    18.96 )-----------( 516      ( 09-17 @ 06:00 )             26.2                8.8    24.03 )-----------( 439      ( 09-16 @ 05:34 )             28.6                10.6   10.37 )-----------( 363      ( 09-15 @ 06:30 )             35.1                10.4   12.96 )-----------( 348      ( 09-14 @ 08:45 )             33.1

## 2020-09-12 DIAGNOSIS — K63.5 POLYP OF COLON: ICD-10-CM

## 2020-09-12 LAB
ANION GAP SERPL CALC-SCNC: 9 MMO/L — SIGNIFICANT CHANGE UP (ref 7–14)
BUN SERPL-MCNC: 13 MG/DL — SIGNIFICANT CHANGE UP (ref 7–23)
CALCIUM SERPL-MCNC: 8.6 MG/DL — SIGNIFICANT CHANGE UP (ref 8.4–10.5)
CHLORIDE SERPL-SCNC: 109 MMOL/L — HIGH (ref 98–107)
CO2 SERPL-SCNC: 26 MMOL/L — SIGNIFICANT CHANGE UP (ref 22–31)
CREAT SERPL-MCNC: 0.53 MG/DL — SIGNIFICANT CHANGE UP (ref 0.5–1.3)
GLUCOSE SERPL-MCNC: 115 MG/DL — HIGH (ref 70–99)
HCT VFR BLD CALC: 34.3 % — LOW (ref 34.5–45)
HGB BLD-MCNC: 10.6 G/DL — LOW (ref 11.5–15.5)
MCHC RBC-ENTMCNC: 29.1 PG — SIGNIFICANT CHANGE UP (ref 27–34)
MCHC RBC-ENTMCNC: 30.9 % — LOW (ref 32–36)
MCV RBC AUTO: 94.2 FL — SIGNIFICANT CHANGE UP (ref 80–100)
NRBC # FLD: 0 K/UL — SIGNIFICANT CHANGE UP (ref 0–0)
PLATELET # BLD AUTO: 366 K/UL — SIGNIFICANT CHANGE UP (ref 150–400)
PMV BLD: 9.9 FL — SIGNIFICANT CHANGE UP (ref 7–13)
POTASSIUM SERPL-MCNC: 4.1 MMOL/L — SIGNIFICANT CHANGE UP (ref 3.5–5.3)
POTASSIUM SERPL-SCNC: 4.1 MMOL/L — SIGNIFICANT CHANGE UP (ref 3.5–5.3)
RBC # BLD: 3.64 M/UL — LOW (ref 3.8–5.2)
RBC # FLD: 14.1 % — SIGNIFICANT CHANGE UP (ref 10.3–14.5)
SODIUM SERPL-SCNC: 144 MMOL/L — SIGNIFICANT CHANGE UP (ref 135–145)
WBC # BLD: 10.47 K/UL — SIGNIFICANT CHANGE UP (ref 3.8–10.5)
WBC # FLD AUTO: 10.47 K/UL — SIGNIFICANT CHANGE UP (ref 3.8–10.5)

## 2020-09-12 PROCEDURE — 99231 SBSQ HOSP IP/OBS SF/LOW 25: CPT

## 2020-09-12 RX ORDER — SODIUM CHLORIDE 9 MG/ML
3 INJECTION INTRAMUSCULAR; INTRAVENOUS; SUBCUTANEOUS EVERY 8 HOURS
Refills: 0 | Status: DISCONTINUED | OUTPATIENT
Start: 2020-09-12 | End: 2020-09-19

## 2020-09-12 RX ADMIN — Medication 40 MILLIGRAM(S): at 12:29

## 2020-09-12 RX ADMIN — ONDANSETRON 4 MILLIGRAM(S): 8 TABLET, FILM COATED ORAL at 12:29

## 2020-09-12 RX ADMIN — ONDANSETRON 4 MILLIGRAM(S): 8 TABLET, FILM COATED ORAL at 23:22

## 2020-09-12 RX ADMIN — RISPERIDONE 1 MILLIGRAM(S): 4 TABLET ORAL at 12:29

## 2020-09-12 RX ADMIN — ONDANSETRON 4 MILLIGRAM(S): 8 TABLET, FILM COATED ORAL at 18:16

## 2020-09-12 RX ADMIN — SODIUM CHLORIDE 3 MILLILITER(S): 9 INJECTION INTRAMUSCULAR; INTRAVENOUS; SUBCUTANEOUS at 21:27

## 2020-09-12 RX ADMIN — ENOXAPARIN SODIUM 40 MILLIGRAM(S): 100 INJECTION SUBCUTANEOUS at 12:29

## 2020-09-12 RX ADMIN — SODIUM CHLORIDE 3 MILLILITER(S): 9 INJECTION INTRAMUSCULAR; INTRAVENOUS; SUBCUTANEOUS at 12:29

## 2020-09-12 NOTE — PROGRESS NOTE ADULT - SUBJECTIVE AND OBJECTIVE BOX
Summary:   45y  Female      Subjective:   No event overnight     Objective:    MEDICATIONS  (STANDING):  enoxaparin Injectable 40 milliGRAM(s) SubCutaneous daily  FLUoxetine 40 milliGRAM(s) Oral daily  ondansetron Injectable 4 milliGRAM(s) IV Push every 6 hours  pantoprazole    Tablet 40 milliGRAM(s) Oral before breakfast  risperiDONE   Tablet 1 milliGRAM(s) Oral daily  sodium chloride 0.9% lock flush 3 milliLiter(s) IV Push every 8 hours    MEDICATIONS  (PRN):  acetaminophen   Tablet .. 650 milliGRAM(s) Oral every 6 hours PRN Mild Pain (1 - 3)  morphine  - Injectable 2 milliGRAM(s) IV Push every 4 hours PRN Moderate Pain (4 - 6)              Vital Signs Last 24 Hrs  T(C): 37.1 (12 Sep 2020 20:19), Max: 37.2 (12 Sep 2020 12:26)  T(F): 98.7 (12 Sep 2020 20:19), Max: 98.9 (12 Sep 2020 12:26)  HR: 85 (12 Sep 2020 20:19) (85 - 103)  BP: 139/71 (12 Sep 2020 20:19) (126/69 - 139/71)  BP(mean): --  RR: 16 (12 Sep 2020 20:19) (15 - 16)  SpO2: 95% (12 Sep 2020 20:19) (94% - 95%)      General:  Well developed, well nourished, alert and active, no pallor, NAD.  HEENT:    Normal appearance of conjunctiva, ears, nose, lips, oropharynx, and oral mucosa, anicteric.  Neck:  No masses, no asymmetry.  Lymph Nodes:  No lymphadenopathy.   Cardiovascular:  RRR normal S1/S2, no murmur.  Respiratory:  CTA B/L, normal respiratory effort.   Abdominal:   soft, no masses or tenderness, normoactive BS, NT/ND, no HSM.  Extremities:   No clubbing or cyanosis, normal capillary refill, no edema.   Skin:   No rash, jaundice, lesions, eczema.   Musculoskeletal:  No joint swelling, erythema or tenderness.   Neuro: No focal deficits.   Other:       LABS:                        10.6   10.47 )-----------( 366      ( 12 Sep 2020 08:45 )             34.3     09-12    144  |  109<H>  |  13  ----------------------------<  115<H>  4.1   |  26  |  0.53    Ca    8.6      12 Sep 2020 08:45  Phos  4.0     09-11  Mg     2.3     09-11            RADIOLOGY & ADDITIONAL TESTS:    < from: Colonoscopy (09.11.20 @ 13:46) >    [facility]  _______________________________________________________________________________  Patient Name: Bianca Catalan       Procedure Date: 9/11/2020 1:46 PM  MRN: [id number]                     Account Number: [id number]  YOB: 1975              Admit Type: Inpatient  Room: [place] [address]                         Gender: Female  Attending MD: Jun Wright MD      _______________________________________________________________________________     Procedure:           Colonoscopy  Indications:         Abnormal CT of the GI tract  Providers:           Jun Wright MD  Medicines:           Monitored Anesthesia Care  Complications:       No immediate complications.  Procedure:           After I obtained informed consent, the scope was passed                        under direct vision. Throughout the procedure, the                        patient's blood pressure, pulse, and oxygen saturations                        were monitored continuously. TheColonoscope was                        introduced through the anus and advanced to the terminal                        ileum. The colonoscopy was somewhat difficult (tortuous                        colon). The patient tolerated the procedure well. The                        quality of the bowel preparation was good.                                                                                   Findings:       A 3 mm polyp was found in the descending colon. The polyp was removed        with a cold biopsy forceps. Resection and retrieval were complete.       The terminal ileum appeared normal.                                                                                   Impression:          - One 3 mm polyp in the descending colon, removed with a                        cold biopsy forceps. Resected and retrieved.                       -Otherwise unremarkable Ileo-colonoscopy                       - The examined portion of the ileum was normal.  Recommendation:      - [facility]                       - Advance diet as tolerated.                       - Await pathology results.                       - Perform an upper GI endoscopy today.                           Attending Participation:       I personally performed the entire procedure.                                                                                     Jun Wright  ___________________  Jun Wright MD  9/11/2020 4:00:23 PM  This report has been signed electronically.  Number of Addenda: 0    Note Initiated On: 9/11/2020 1:46 PM    < end of copied text >

## 2020-09-12 NOTE — PROGRESS NOTE ADULT - SUBJECTIVE AND OBJECTIVE BOX
GYN ONC PROGRESS NOTE    Pt seen and examined at bedside. No overnight events. Denies acute complaints at this time. Pain well controlled on current regimen; additionally reports improvement in abdominal pain after colonoscopy. Last BM prior to colonoscopy. Has not tried PO since procedure, however, denies nausea/vomiting. Ambulating without lightheadedness/dizziness. Voiding freely.     Vital Signs Last 24 Hrs  T(C): 36.6 (11 Sep 2020 21:44), Max: 37.6 (11 Sep 2020 16:10)  T(F): 97.9 (11 Sep 2020 21:44), Max: 99.7 (11 Sep 2020 16:10)  HR: 83 (11 Sep 2020 21:44) (83 - 103)  BP: 129/69 (11 Sep 2020 21:44) (124/88 - 144/87)  BP(mean): --  RR: 16 (11 Sep 2020 21:44) (16 - 27)  SpO2: 98% (11 Sep 2020 21:44) (98% - 98%)    09-11 @ 07:01  -  09-12 @ 07:00  --------------------------------------------------------  IN: 600 mL / OUT: 0 mL / NET: 600 mL    09-12 @ 07:01  -  09-12 @ 10:31  --------------------------------------------------------  IN: 0 mL / OUT: 400 mL / NET: -400 mL      PHYSICAL EXAM:  Gen: NAD, A+O x 3  CV: Normal S1/S2, regular rate/rhythm  Pulm: CTAB  Abd: Soft, softly distended, minimal tenderness to palpation in midline; no rebound/guarding; hypoactive BS  Extremities: No calf tenderness, SCDs in place    Labs, additional tests:             10.6   10.47 )-----------( 366      ( 09-12 @ 08:45 )             34.3                10.3   11.30 )-----------( 400      ( 09-11 @ 05:00 )             33.5                10.2   11.98 )-----------( 384      ( 09-10 @ 05:30 )             33.2       09-12    144  |  109<H>  |  13  ----------------------------<  115<H>  4.1   |  26  |  0.53    Ca    8.6      12 Sep 2020 08:45  Phos  4.0     09-11  Mg     2.3     09-11        MEDICATIONS  (STANDING):  dextrose 5% + lactated ringers. 1000 milliLiter(s) (75 mL/Hr) IV Continuous <Continuous>  enoxaparin Injectable 40 milliGRAM(s) SubCutaneous daily  FLUoxetine 40 milliGRAM(s) Oral daily  ondansetron Injectable 4 milliGRAM(s) IV Push every 6 hours  pantoprazole    Tablet 40 milliGRAM(s) Oral before breakfast  risperiDONE   Tablet 1 milliGRAM(s) Oral daily    MEDICATIONS  (PRN):  acetaminophen   Tablet .. 650 milliGRAM(s) Oral every 6 hours PRN Mild Pain (1 - 3)  morphine  - Injectable 2 milliGRAM(s) IV Push every 4 hours PRN Moderate Pain (4 - 6)    < from: Colonoscopy (09.11.20 @ 13:46) >    [facility]  _______________________________________________________________________________  Patient Name: Bianca Catalan       Procedure Date: 9/11/2020 1:46 PM  MRN: [id number]                     Account Number: [id number]  YOB: 1975              Admit Type: Inpatient  Room: [facility] 02                         Gender: Female  Attending MD: Jun Wright MD      _______________________________________________________________________________     Procedure:           Colonoscopy  Indications:         Abnormal CT of the GI tract  Providers:           Jun Wright MD  Medicines:           Monitored Anesthesia Care  Complications:       No immediate complications.  Procedure:           After I obtained informed consent, the scope was passed                        under direct vision. Throughout the procedure, the                        patient's blood pressure, pulse, and oxygen saturations                        were monitored continuously. TheColonoscope was                        introduced through the anus and advanced to the terminal                        ileum. The colonoscopy was somewhat difficult (tortuous                        colon). The patient tolerated the procedure well. The                        quality of the bowel preparation was good.                                                                                   Findings:       A 3 mm polyp was found in the descending colon. The polyp was removed        with a cold biopsy forceps. Resection and retrieval were complete.       The terminal ileum appeared normal.                   Impression:          - One 3 mm polyp in the descending colon, removed with a                        cold biopsy forceps. Resected and retrieved.                       -Otherwise unremarkable Ileo-colonoscopy                       - The examined portion of the ileum was normal.  Recommendation:      - [facility].                       - Advance diet as tolerated.                       - Await pathology results.                       - Perform an upper GI endoscopy today.                           Attending Participation:       I personally performed the entire procedure.                                                                                     Jun Wright  ___________________  Jun Wright MD  9/11/2020 4:00:23 PM  This report has been signed electronically.  Number of Addenda: 0    Note Initiated On: 9/11/2020 1:46 PM    < end of copied text >                                                                      < from: Upper Endoscopy (09.11.20 @ 13:43) >    [facility]  _______________________________________________________________________________  Patient Name: Bianca Catalan       Procedure Date: 9/11/2020 1:43 PM  MRN: [id number]                     Account Number: [id number]  YOB: 1975              Admit Type: Inpatient  Room: [place] [address]                         Gender: Female  Attending MD: Jun Wright MD      _______________________________________________________________________________     Procedure:           Upper GI endoscopy  Indications:         Dyspepsia  Providers:           Jun Wright MD  Medicines:           Monitored Anesthesia Care  Complications:       No immediate complications.  Procedure:           After obtaining informedconsent, the endoscope was                        passed under direct vision. Throughout the procedure,                        the patient's blood pressure, pulse, and oxygen                        saturations were monitored continuously. The Endoscope                        was introduced through the mouth, and advanced to the                        third part of duodenum. The upper GI endoscopy was                        accomplished without difficulty. The patient tolerated      the procedure well.                                                                                   Findings:       A 3 cm hiatus hernia was present.       LA Grade D (one or more mucosal breaks involving at least 75% of        esophageal circumference) esophagitis with mucosal friability was found        35 cm from the incisors.       Mild inflammation characterized by erythema was found in the gastric        antrum. Biopsies were taken with a cold forceps for Helicobacter pylori        testing.       A few 6 mm pedunculated polyps (fundic gland) with no stigmata of recent        bleeding were found in the gastric body. This was biopsied with a cold        forceps for histology.       The examined duodenum was normal.                                                                 Impression:          - LA Grade D esophagitis.                       - Gastritis. Biopsied.                       - A few gastric polyps. Biopsied.                       - Normal examinedduodenum.  Recommendation:      - [facility].                       - Follow an antireflux regimen.                       - Use Protonix (pantoprazole) 40 mg PO daily for 1 month.                                                   Attending Participation:       I personally performed the entire procedure.                                                                                     Jun Wright  ___________________  Jun Wright MD  9/11/2020 4:06:30 PM  This report has been signed electronically.  Number of Addenda: 0    Note Initiated On: 9/11/2020 1:43 PM    < end of copied text >

## 2020-09-12 NOTE — PROGRESS NOTE ADULT - SUBJECTIVE AND OBJECTIVE BOX
Ms. Catalan is comfortable in bed  Had an episode of nausea earlier that self resolved  No emesis  Passing flatus  Denies abdominal pain    ICU Vital Signs Last 24 Hrs  T(C): 36.6 (11 Sep 2020 21:44), Max: 37.6 (11 Sep 2020 16:10)  T(F): 97.9 (11 Sep 2020 21:44), Max: 99.7 (11 Sep 2020 16:10)  HR: 83 (11 Sep 2020 21:44) (83 - 103)  BP: 129/69 (11 Sep 2020 21:44) (124/88 - 144/87)  BP(mean): --  ABP: --  ABP(mean): --  RR: 16 (11 Sep 2020 21:44) (16 - 27)  SpO2: 98% (11 Sep 2020 21:44) (98% - 98%)    On exam: awake, alert and oriented  Breathing comfortably on room air  Moving all extremities  Abd is soft, not tender, no rebound, no guarding                          10.6   10.47 )-----------( 366      ( 12 Sep 2020 08:45 )             34.3   < from: Colonoscopy (09.11.20 @ 13:46) >  Findings:       A 3 mm polyp was found in the descending colon. The polyp was removed        with a cold biopsy forceps. Resection and retrieval were complete.       The terminal ileum appeared normal.                                                                                   Impression:          - One 3 mm polyp in the descending colon, removed with a                        cold biopsy forceps. Resected and retrieved.                       -Otherwise unremarkable Ileo-colonoscopy                       - The examined portion of the ileum was normal.  Recommendation:      - [facility].                       - Advance diet as tolerated.                       - Await pathology results.                       - Perform an upper GI endoscopy today.    < end of copied text >

## 2020-09-12 NOTE — PROGRESS NOTE ADULT - ASSESSMENT
46 y/o G0, LMP 96/20, with large complex multiseptated pelvic mass and peritoneal carcinomatosis suspicious for ovarian malignancy admitted with abdominal pain. Patient tachycardic to the 110s, vitals otherwise stable. Exam with moderate abdominal distention and non-focal tenderness.  and CA 19-9 elevated. CT chest without evidence of metastatic disease.

## 2020-09-12 NOTE — PROGRESS NOTE ADULT - SUBJECTIVE AND OBJECTIVE BOX
Subjective: Patient seen and examined. No new events except as noted.     REVIEW OF SYSTEMS:    CONSTITUTIONAL: +weakness, fevers or chills  EYES/ENT: No visual changes;  No vertigo or throat pain   NECK: No pain or stiffness  RESPIRATORY: No cough, wheezing, hemoptysis; No shortness of breath  CARDIOVASCULAR: No chest pain or palpitations  GASTROINTESTINAL: No abdominal or epigastric pain. No nausea, vomiting, or hematemesis; No diarrhea or constipation. No melena or hematochezia.  GENITOURINARY: No dysuria, frequency or hematuria  NEUROLOGICAL: No numbness or weakness  SKIN: No itching, burning, rashes, or lesions   All other review of systems is negative unless indicated above.    MEDICATIONS:  MEDICATIONS  (STANDING):  enoxaparin Injectable 40 milliGRAM(s) SubCutaneous daily  FLUoxetine 40 milliGRAM(s) Oral daily  ondansetron Injectable 4 milliGRAM(s) IV Push every 6 hours  pantoprazole    Tablet 40 milliGRAM(s) Oral before breakfast  risperiDONE   Tablet 1 milliGRAM(s) Oral daily  sodium chloride 0.9% lock flush 3 milliLiter(s) IV Push every 8 hours      PHYSICAL EXAM:  T(C): 37.1 (09-12-20 @ 20:19), Max: 37.2 (09-12-20 @ 12:26)  HR: 85 (09-12-20 @ 20:19) (83 - 103)  BP: 139/71 (09-12-20 @ 20:19) (126/69 - 139/71)  RR: 16 (09-12-20 @ 20:19) (15 - 16)  SpO2: 95% (09-12-20 @ 20:19) (94% - 98%)  Wt(kg): --  I&O's Summary    11 Sep 2020 07:01  -  12 Sep 2020 07:00  --------------------------------------------------------  IN: 600 mL / OUT: 0 mL / NET: 600 mL    12 Sep 2020 07:01  -  12 Sep 2020 21:12  --------------------------------------------------------  IN: 0 mL / OUT: 950 mL / NET: -950 mL          Appearance: NAD  HEENT:   Normal oral mucosa, PERRL, EOMI	  Lymphatic: No lymphadenopathy , no edema  Cardiovascular: Normal S1 S2, No JVD, No murmurs , Peripheral pulses palpable 2+ bilaterally  Respiratory: Lungs clear to auscultation, normal effort 	  Gastrointestinal:  Soft, Non-tender, + BS	  Skin: No rashes, No ecchymoses, No cyanosis, warm to touch  Musculoskeletal: Normal range of motion, normal strength  Psychiatry:  Mood & affect appropriate  Ext: No edema      LABS:    CARDIAC MARKERS:                                10.6   10.47 )-----------( 366      ( 12 Sep 2020 08:45 )             34.3     09-12    144  |  109<H>  |  13  ----------------------------<  115<H>  4.1   |  26  |  0.53    Ca    8.6      12 Sep 2020 08:45  Phos  4.0     09-11  Mg     2.3     09-11      proBNP:   Lipid Profile:   HgA1c:   TSH:             TELEMETRY: 	    ECG:  	  RADIOLOGY:   DIAGNOSTIC TESTING:  [ ] Echocardiogram:  [ ]  Catheterization:  [ ] Stress Test:    OTHER: 	  en< from: Upper Endoscopy (09.11.20 @ 13:43) >    [facility]  _______________________________________________________________________________  Patient Name: Bianca Catalan       Procedure Date: 9/11/2020 1:43 PM  MRN: [id number]                     Account Number: [id number]  YOB: 1975              Admit Type: Inpatient  Room: [place] [address]                         Gender: Female  Attending MD: Jun Wright MD      _______________________________________________________________________________     Procedure:           Upper GI endoscopy  Indications:         Dyspepsia  Providers:           Jun Wright MD  Medicines:           Monitored Anesthesia Care  Complications:       No immediate complications.  Procedure:           After obtaining informedconsent, the endoscope was                        passed under direct vision. Throughout the procedure,                        the patient's blood pressure, pulse, and oxygen                        saturations were monitored continuously. The Endoscope                        was introduced through the mouth, and advanced to the                        third part of duodenum. The upper GI endoscopy was                        accomplished without difficulty. The patient tolerated      the procedure well.                                                                                   Findings:       A 3 cm hiatus hernia was present.       LA Grade D (one or more mucosal breaks involving at least 75% of        esophageal circumference) esophagitis with mucosal friability was found        35 cm from the incisors.       Mild inflammation characterized by erythema was found in the gastric        antrum. Biopsies were taken with a cold forceps for Helicobacter pylori        testing.       A few 6 mm pedunculated polyps (fundic gland) with no stigmata of recent        bleeding were found in the gastric body. This was biopsied with a cold        forceps for histology.       The examined duodenum was normal.                                                                 Impression:          - LA Grade D esophagitis.                       - Gastritis. Biopsied.                       - A few gastric polyps. Biopsied.                       - Normal examinedduodenum.  Recommendation:      - [facility].                       - Follow an antireflux regimen.                       - Use Protonix (pantoprazole) 40 mg PO daily for 1 month.                                                   Attending Participation:       I personally performed the entire procedure.                                                                                     Jun Wright  ___________________  Jun Wright MD  9/11/2020 4:06:30 PM  This report has been signed electronically.  Number of Addenda: 0    Note Initiated On: 9/11/2020 1:43 PM    < end of copied text >    c< from: Colonoscopy (09.11.20 @ 13:46) >    [facility]  _______________________________________________________________________________  Patient Name: Bianca Catalan       Procedure Date: 9/11/2020 1:46 PM  MRN: [id number]                     Account Number: [id number]  YOB: 1975              Admit Type: Inpatient  Room: [place] [address]                         Gender: Female  Attending MD: Jun Wright MD      _______________________________________________________________________________     Procedure:           Colonoscopy  Indications:         Abnormal CT of the GI tract  Providers:           Jun Wright MD  Medicines:           Monitored Anesthesia Care  Complications:       No immediate complications.  Procedure:           After I obtained informed consent, the scope was passed                        under direct vision. Throughout the procedure, the                        patient's blood pressure, pulse, and oxygen saturations                        were monitored continuously. TheColonoscope was                        introduced through the anus and advanced to the terminal                        ileum. The colonoscopy was somewhat difficult (tortuous                        colon). The patient tolerated the procedure well. The                        quality of the bowel preparation was good.                                                                                   Findings:       A 3 mm polyp was found in the descending colon. The polyp was removed        with a cold biopsy forceps. Resection and retrieval were complete.       The terminal ileum appeared normal.                                                                                   Impression:          - One 3 mm polyp in the descending colon, removed with a                        cold biopsy forceps. Resected and retrieved.                       -Otherwise unremarkable Ileo-colonoscopy                       - The examined portion of the ileum was normal.  Recommendation:      - [facility].                       - Advance diet as tolerated.                       - Await pathology results.                       - Perform an upper GI endoscopy today.                           Attending Participation:       I personally performed the entire procedure.                                                                                     Jun Wright  ___________________  Jun Wright MD  9/11/2020 4:00:23 PM  This report has been signed electronically.  Number of Addenda: 0    Note Initiated On: 9/11/2020 1:46 PM    < end of copied text >

## 2020-09-12 NOTE — PROGRESS NOTE ADULT - PROBLEM SELECTOR PLAN 1
Neuro: Continue Tylenol with Morphine PRN for pain control.   Psych: h/o paranoid schizophrenia, anxiety and depression: c/w home Risperdal and Prozac  - Appreciate psych recs (9/12: c/w current meds, no further role for medication optimization)  CV: hemodynamically stable, f/u AM CBC   - h/o pulmonic stenosis: TTE (9/10) showing normal pulmonic valve and mild to moderate TR; Cardiology stating that patient would be an acceptable surgical candidate from a cardia perspective. Appreciate Cards recs.  Pulm: Saturating well on RA. Increase incentive spirometry.  FEN: advance to regular diet, SLIV, F/U BMP/Mg/Phos, replete lytes prn   GI: c/w Protonix 40mg PO qD, Zofran q6 standing for nausea; s/p Endoscopy/Colonoscopy (9/11): f/u bx of esophagitis, gastric polyps, and 3mm colonic polyp  : Voiding freely  ID: No active issues. Repeat COVID swab 9/13 in prep for OR.  Heme: Continue Lovenox and Venodynes for DVT ppx. Increase ambulation.   Dispo: Continue inpatient management, poss lab holiday pending labwork    Julienne Aguilar R2 Neuro: Continue Tylenol with Morphine PRN for pain control.   Psych: h/o paranoid schizophrenia, anxiety and depression: c/w home Risperdal and Prozac  - Appreciate psych recs (9/12: c/w current meds, no further role for medication optimization)  CV: hemodynamically stable, f/u AM CBC   - h/o pulmonic stenosis: TTE (9/10) showing normal pulmonic valve and mild to moderate TR; Cardiology stating that patient would be an acceptable surgical candidate from a cardia perspective. Appreciate Cards recs.  Pulm: Saturating well on RA. Increase incentive spirometry.  FEN: advance to regular diet, SLIV, F/U BMP/Mg/Phos, replete lytes prn   GI: c/w Protonix 40mg PO qD, Zofran q6 standing for nausea; s/p Endoscopy/Colonoscopy (9/11): f/u bx of esophagitis, gastric polyps, and 3mm colonic polyp  : Voiding freely  ID: No active issues. Repeat COVID swab 9/13 in prep for OR.  Heme: Continue Lovenox and Venodynes for DVT ppx. Increase ambulation.   Dispo: Continue inpatient management, poss lab holiday pending labwork    Julienne Aguilar R2    Fellow Addendum: Agree with above. Pt seen and examined at bedside. No acute issues. She has been cleared by cardiology for the OR on Tuesday. Her CBC and BMP are stable. No further labs until Monday. Regular diet, d/c IVF. Will need COVID swab tomorrow for preop.     PAVAN Arevalo, PGY-5  D/w Dr. Chu

## 2020-09-12 NOTE — PROGRESS NOTE ADULT - ASSESSMENT
45 year old woman with pelvic mass  - GYN/oncology planning for OR Tuesday  - we will be available on Tuesday for OR

## 2020-09-12 NOTE — PROGRESS NOTE ADULT - ASSESSMENT
44yo transferred from [facility] after p/w partial SBO in context of known complex multiseptated pelvic mass with associated peritoneal carcinomatosis, suspicious for possible ovarian malignancy. ( 211,  434, CEA <0.6). CT chest without evidence of metastatic disease. Abdominal tenderness improving and passing flatus and having bowel movements. s/p Endoscopy/Colonoscopy (9/11) significant for 3cm hiatus hernia, LA Grade D esophagitis, gastritis (bx), few gastric polyps (bx); 3mm polyp in descending colon (resected).

## 2020-09-13 DIAGNOSIS — R19.00 INTRA-ABDOMINAL AND PELVIC SWELLING, MASS AND LUMP, UNSPECIFIED SITE: ICD-10-CM

## 2020-09-13 LAB — SARS-COV-2 RNA SPEC QL NAA+PROBE: SIGNIFICANT CHANGE UP

## 2020-09-13 PROCEDURE — 99231 SBSQ HOSP IP/OBS SF/LOW 25: CPT

## 2020-09-13 RX ADMIN — ONDANSETRON 4 MILLIGRAM(S): 8 TABLET, FILM COATED ORAL at 12:09

## 2020-09-13 RX ADMIN — SODIUM CHLORIDE 3 MILLILITER(S): 9 INJECTION INTRAMUSCULAR; INTRAVENOUS; SUBCUTANEOUS at 12:08

## 2020-09-13 RX ADMIN — SODIUM CHLORIDE 3 MILLILITER(S): 9 INJECTION INTRAMUSCULAR; INTRAVENOUS; SUBCUTANEOUS at 04:49

## 2020-09-13 RX ADMIN — ONDANSETRON 4 MILLIGRAM(S): 8 TABLET, FILM COATED ORAL at 18:21

## 2020-09-13 RX ADMIN — ENOXAPARIN SODIUM 40 MILLIGRAM(S): 100 INJECTION SUBCUTANEOUS at 12:09

## 2020-09-13 RX ADMIN — PANTOPRAZOLE SODIUM 40 MILLIGRAM(S): 20 TABLET, DELAYED RELEASE ORAL at 04:48

## 2020-09-13 RX ADMIN — RISPERIDONE 1 MILLIGRAM(S): 4 TABLET ORAL at 12:09

## 2020-09-13 RX ADMIN — SODIUM CHLORIDE 3 MILLILITER(S): 9 INJECTION INTRAMUSCULAR; INTRAVENOUS; SUBCUTANEOUS at 21:20

## 2020-09-13 RX ADMIN — ONDANSETRON 4 MILLIGRAM(S): 8 TABLET, FILM COATED ORAL at 04:48

## 2020-09-13 RX ADMIN — Medication 40 MILLIGRAM(S): at 12:09

## 2020-09-13 NOTE — PROGRESS NOTE ADULT - SUBJECTIVE AND OBJECTIVE BOX
Subjective: Patient seen and examined. No new events except as noted.     REVIEW OF SYSTEMS:    CONSTITUTIONAL: No weakness, fevers or chills  EYES/ENT: No visual changes;  No vertigo or throat pain   NECK: No pain or stiffness  RESPIRATORY: No cough, wheezing, hemoptysis; No shortness of breath  CARDIOVASCULAR: No chest pain or palpitations  GASTROINTESTINAL: No abdominal or epigastric pain. No nausea, vomiting, or hematemesis; No diarrhea or constipation. No melena or hematochezia.  GENITOURINARY: No dysuria, frequency or hematuria  NEUROLOGICAL: No numbness or weakness  SKIN: No itching, burning, rashes, or lesions   All other review of systems is negative unless indicated above.    MEDICATIONS:  MEDICATIONS  (STANDING):  enoxaparin Injectable 40 milliGRAM(s) SubCutaneous daily  FLUoxetine 40 milliGRAM(s) Oral daily  ondansetron Injectable 4 milliGRAM(s) IV Push every 6 hours  pantoprazole    Tablet 40 milliGRAM(s) Oral before breakfast  risperiDONE   Tablet 1 milliGRAM(s) Oral daily  sodium chloride 0.9% lock flush 3 milliLiter(s) IV Push every 8 hours      PHYSICAL EXAM:  T(C): 36.7 (09-13-20 @ 04:46), Max: 37.2 (09-12-20 @ 12:26)  HR: 83 (09-13-20 @ 04:46) (83 - 103)  BP: 134/71 (09-13-20 @ 04:46) (126/69 - 139/71)  RR: 16 (09-13-20 @ 04:46) (15 - 16)  SpO2: 95% (09-13-20 @ 04:46) (94% - 95%)  Wt(kg): --  I&O's Summary    12 Sep 2020 07:01  -  13 Sep 2020 07:00  --------------------------------------------------------  IN: 0 mL / OUT: 1900 mL / NET: -1900 mL          Appearance: Normal	  HEENT:   Normal oral mucosa, PERRL, EOMI	  Lymphatic: No lymphadenopathy , no edema  Cardiovascular: Normal S1 S2, No JVD, No murmurs , Peripheral pulses palpable 2+ bilaterally  Respiratory: Lungs clear to auscultation, normal effort 	  Gastrointestinal:  Soft, Non-tender, + BS	  Skin: No rashes, No ecchymoses, No cyanosis, warm to touch  Musculoskeletal: Normal range of motion, normal strength  Psychiatry:  Mood & affect appropriate  Ext: No edema      LABS:    CARDIAC MARKERS:                                10.6   10.47 )-----------( 366      ( 12 Sep 2020 08:45 )             34.3     09-12    144  |  109<H>  |  13  ----------------------------<  115<H>  4.1   |  26  |  0.53    Ca    8.6      12 Sep 2020 08:45      proBNP:   Lipid Profile:   HgA1c:   TSH:             TELEMETRY: 	    ECG:  	  RADIOLOGY:   DIAGNOSTIC TESTING:  [ ] Echocardiogram:  [ ]  Catheterization:  [ ] Stress Test:    OTHER:

## 2020-09-13 NOTE — PROGRESS NOTE ADULT - SUBJECTIVE AND OBJECTIVE BOX
Summary:   45y  Female      Subjective:   No event overnight     Objective:    MEDICATIONS  (STANDING):  enoxaparin Injectable 40 milliGRAM(s) SubCutaneous daily  FLUoxetine 40 milliGRAM(s) Oral daily  ondansetron Injectable 4 milliGRAM(s) IV Push every 6 hours  pantoprazole    Tablet 40 milliGRAM(s) Oral before breakfast  risperiDONE   Tablet 1 milliGRAM(s) Oral daily  sodium chloride 0.9% lock flush 3 milliLiter(s) IV Push every 8 hours    MEDICATIONS  (PRN):  acetaminophen   Tablet .. 650 milliGRAM(s) Oral every 6 hours PRN Mild Pain (1 - 3)  morphine  - Injectable 2 milliGRAM(s) IV Push every 4 hours PRN Moderate Pain (4 - 6)              Vital Signs Last 24 Hrs  T(C): 37.1 (12 Sep 2020 20:19), Max: 37.2 (12 Sep 2020 12:26)  T(F): 98.7 (12 Sep 2020 20:19), Max: 98.9 (12 Sep 2020 12:26)  HR: 85 (12 Sep 2020 20:19) (85 - 103)  BP: 139/71 (12 Sep 2020 20:19) (126/69 - 139/71)  BP(mean): --  RR: 16 (12 Sep 2020 20:19) (15 - 16)  SpO2: 95% (12 Sep 2020 20:19) (94% - 95%)      General:  Well developed, well nourished, alert and active, no pallor, NAD.  HEENT:    Normal appearance of conjunctiva, ears, nose, lips, oropharynx, and oral mucosa, anicteric.  Neck:  No masses, no asymmetry.  Lymph Nodes:  No lymphadenopathy.   Cardiovascular:  RRR normal S1/S2, no murmur.  Respiratory:  CTA B/L, normal respiratory effort.   Abdominal:   soft, no masses or tenderness, normoactive BS, NT/ND, no HSM.  Extremities:   No clubbing or cyanosis, normal capillary refill, no edema.   Skin:   No rash, jaundice, lesions, eczema.   Musculoskeletal:  No joint swelling, erythema or tenderness.   Neuro: No focal deficits.   Other:       LABS:                        10.6   10.47 )-----------( 366      ( 12 Sep 2020 08:45 )             34.3     09-12    144  |  109<H>  |  13  ----------------------------<  115<H>  4.1   |  26  |  0.53    Ca    8.6      12 Sep 2020 08:45  Phos  4.0     09-11  Mg     2.3     09-11            RADIOLOGY & ADDITIONAL TESTS:    < from: Colonoscopy (09.11.20 @ 13:46) >    [facility]  _______________________________________________________________________________  Patient Name: Bianca Catalan       Procedure Date: 9/11/2020 1:46 PM  MRN: [id number]                     Account Number: [id number]  YOB: 1975              Admit Type: Inpatient  Room: [place] [id number]                         Gender: Female  Attending MD: Jun Wright MD      _______________________________________________________________________________     Procedure:           Colonoscopy  Indications:         Abnormal CT of the GI tract  Providers:           Jun Wright MD  Medicines:           Monitored Anesthesia Care  Complications:       No immediate complications.  Procedure:           After I obtained informed consent, the scope was passed                        under direct vision. Throughout the procedure, the                        patient's blood pressure, pulse, and oxygen saturations                        were monitored continuously. TheColonoscope was                        introduced through the anus and advanced to the terminal                        ileum. The colonoscopy was somewhat difficult (tortuous                        colon). The patient tolerated the procedure well. The                        quality of the bowel preparation was good.                                                                                   Findings:       A 3 mm polyp was found in the descending colon. The polyp was removed        with a cold biopsy forceps. Resection and retrieval were complete.       The terminal ileum appeared normal.                                                                                   Impression:          - One 3 mm polyp in the descending colon, removed with a                        cold biopsy forceps. Resected and retrieved.                       -Otherwise unremarkable Ileo-colonoscopy                       - The examined portion of the ileum was normal.  Recommendation:      - [facility].                       - Advance diet as tolerated.                       - Await pathology results.                       - Perform an upper GI endoscopy today.                           Attending Participation:       I personally performed the entire procedure.                                                                                     Jun Wright  ___________________  Jun Wright MD  9/11/2020 4:00:23 PM  This report has been signed electronically.  Number of Addenda: 0    Note Initiated On: 9/11/2020 1:46 PM    < end of copied text >

## 2020-09-13 NOTE — PROGRESS NOTE ADULT - ASSESSMENT
44yo transferred from [facility] after p/w partial SBO in context of known complex multiseptated pelvic mass with associated peritoneal carcinomatosis, suspicious for possible ovarian malignancy. ( 211,  434, CEA <0.6). CT chest without evidence of metastatic disease. Abdominal tenderness improving and passing flatus and having bowel movements. s/p Endoscopy/Colonoscopy (9/11) significant for 3cm hiatus hernia, LA Grade D esophagitis, gastritis (bx), few gastric polyps (bx); 3mm polyp in descending colon (resected).    Neuro: Continue Tylenol with Morphine PRN for pain control.   Psych: h/o paranoid schizophrenia, anxiety and depression: c/w home Risperdal and Prozac  - Appreciate psych recs (9/12: c/w current meds, no further role for medication optimization)  CV: hemodynamically stable, lab holiday 9/13, repeat CBC on 9/14  - h/o pulmonic stenosis: TTE (9/10) showing normal pulmonic valve and mild to moderate TR; Cardiology stating that patient would be an acceptable surgical candidate from cardiac perspective. Appreciate Cards recs.  Pulm: Saturating well on RA. Increase incentive spirometry.  FEN: c/w regular diet, SLIV, lab holiday 9/13, repeat BMP/Mg/Phos 9/14  GI: c/w Protonix 40mg PO qD, Zofran q6 standing for nausea;  - planning bowel prep 9/14 PM in prep for OR  - s/p Endoscopy/Colonoscopy (9/11): no infiltration or masses seen on colonoscopy; f/u bx of esophagitis, gastric polyps, and 3mm colonic polyp; F/U pending path  : Voiding freely  ID: No active issues. Repeat COVID swab 9/13 in prep for OR.  Heme: Continue Lovenox and Venodynes for DVT ppx. Increase ambulation. T&S for 9/14.  Dispo: Continue inpatient management, lab holiday planned today, however will draw bloodwork PRN

## 2020-09-13 NOTE — PROGRESS NOTE ADULT - PROBLEM SELECTOR PLAN 1
Fellow Addendum: Agree with above.   No acute events overnight. Tolerating regular diet w/o n/v/d.  VSS, afebrile. Abdomen +BS, soft, non-distended, NTTP.   - Repeat COVID testing today for preop.  - DVT ppx: OOB  - Plan for OR Tuesday.  D/w Dr. Loera, PGY-5

## 2020-09-13 NOTE — PROGRESS NOTE ADULT - SUBJECTIVE AND OBJECTIVE BOX
GYN ONC PROGRESS NOTE  HD#    Pt seen and examined at bedside. No overnight events.  Pt without acute complaints. Continues to report adequate pain control on current regimen. Tolerating regular diet without nausea/emesis. Voiding freely. OOB, sat in chair yesterday. Passing flatus. Denies SOB/CP/palpitations, fever/chills.    Vital Signs Last 24 Hrs  T(C): 36.7 (13 Sep 2020 04:46), Max: 37.2 (12 Sep 2020 12:26)  T(F): 98.1 (13 Sep 2020 04:46), Max: 98.9 (12 Sep 2020 12:26)  HR: 83 (13 Sep 2020 04:46) (83 - 103)  BP: 134/71 (13 Sep 2020 04:46) (126/69 - 139/71)  BP(mean): --  RR: 16 (13 Sep 2020 04:46) (15 - 16)  SpO2: 95% (13 Sep 2020 04:46) (94% - 95%)    09-11 @ 07:01  -  09-12 @ 07:00  --------------------------------------------------------  IN: 600 mL / OUT: 0 mL / NET: 600 mL    09-12 @ 07:01  -  09-13 @ 05:41  --------------------------------------------------------  IN: 0 mL / OUT: 1900 mL / NET: -1900 mL      PHYSICAL EXAM:  Gen: NAD, A+O x 3  CV: Normal S1/S2, regular rate/rhythm  Pulm: CTAB  Abd: Soft, softly distended, minimal tenderness to palpation in midline; no rebound/guarding  Extremities: No calf tenderness    Labs, additional tests:             10.6   10.47 )-----------( 366      ( 09-12 @ 08:45 )             34.3                10.3   11.30 )-----------( 400      ( 09-11 @ 05:00 )             33.5       09-12    144  |  109<H>  |  13  ----------------------------<  115<H>  4.1   |  26  |  0.53    Ca    8.6      12 Sep 2020 08:45        MEDICATIONS  (STANDING):  enoxaparin Injectable 40 milliGRAM(s) SubCutaneous daily  FLUoxetine 40 milliGRAM(s) Oral daily  ondansetron Injectable 4 milliGRAM(s) IV Push every 6 hours  pantoprazole    Tablet 40 milliGRAM(s) Oral before breakfast  risperiDONE   Tablet 1 milliGRAM(s) Oral daily  sodium chloride 0.9% lock flush 3 milliLiter(s) IV Push every 8 hours    MEDICATIONS  (PRN):  acetaminophen   Tablet .. 650 milliGRAM(s) Oral every 6 hours PRN Mild Pain (1 - 3)  morphine  - Injectable 2 milliGRAM(s) IV Push every 4 hours PRN Moderate Pain (4 - 6)        46yo transferred from [facility] after p/w partial SBO in context of known complex multiseptated pelvic mass with associated peritoneal carcinomatosis, suspicious for possible ovarian malignancy. ( 211,  434, CEA <0.6). CT chest without evidence of metastatic disease. Abdominal tenderness improving and passing flatus and having bowel movements. s/p Endoscopy/Colonoscopy (9/11) significant for 3cm hiatus hernia, LA Grade D esophagitis, gastritis (bx), few gastric polyps (bx); 3mm polyp in descending colon (resected).  Neuro: Continue Tylenol with Morphine PRN for pain control.   Psych: h/o paranoid schizophrenia, anxiety and depression: c/w home Risperdal and Prozac  - Appreciate psych recs (9/12: c/w current meds, no further role for medication optimization)  CV: hemodynamically stable, lab holiday 9/13, repeat CBC on 9/14  - h/o pulmonic stenosis: TTE (9/10) showing normal pulmonic valve and mild to moderate TR; Cardiology stating that patient would be an acceptable surgical candidate from cardiac perspective. Appreciate Cards recs.  Pulm: Saturating well on RA. Increase incentive spirometry.  FEN: c/w regular diet, SLIV, lab holiday 9/13, repeat BMP/Mg/Phos 9/14  GI: c/w Protonix 40mg PO qD, Zofran q6 standing for nausea;  - planning bowel prep 9/14 PM in prep for OR  - s/p Endoscopy/Colonoscopy (9/11): no infiltration or masses seen on colonoscopy; f/u bx of esophagitis, gastric polyps, and 3mm colonic polyp; F/U pending path  : Voiding freely  ID: No active issues. Repeat COVID swab 9/13 in prep for OR.  Heme: Continue Lovenox and Venodynes for DVT ppx. Increase ambulation. T&S for 9/14.  Dispo: Continue inpatient management, lab holiday   GYN ONC PROGRESS NOTE    Pt seen and examined at bedside. No overnight events. Pt without acute complaints. Continues to report adequate pain control. Reports intermittent L lumbar pain, unchanged. Tolerating regular diet without nausea/emesis. Reports having a tasty orange and salmon for dinner. Voiding freely. Had 2-3x loose BM; this was first since colonoscopy. +Flatus. OOB, sat in chair yesterday. Ambulating without lightheadedness/dizziness. Denies SOB/CP/palpitations, fever/chills.    Vital Signs Last 24 Hrs  T(C): 36.7 (13 Sep 2020 04:46), Max: 37.2 (12 Sep 2020 12:26)  T(F): 98.1 (13 Sep 2020 04:46), Max: 98.9 (12 Sep 2020 12:26)  HR: 83 (13 Sep 2020 04:46) (83 - 103)  BP: 134/71 (13 Sep 2020 04:46) (126/69 - 139/71)  RR: 16 (13 Sep 2020 04:46) (15 - 16)  SpO2: 95% (13 Sep 2020 04:46) (94% - 95%)    09-11 @ 07:01  -  09-12 @ 07:00  --------------------------------------------------------  IN: 600 mL / OUT: 0 mL / NET: 600 mL    09-12 @ 07:01  -  09-13 @ 05:41  --------------------------------------------------------  IN: 0 mL / OUT: 1900 mL / NET: -1900 mL      PHYSICAL EXAM:  Gen: NAD, A+O x 3  CV: Normal S1/S2, regular rate/rhythm  Pulm: CTAB  Abd: Soft, softly distended, minimal tenderness to palpation in midline; no rebound/guarding  Extremities: No calf tenderness    Labs, additional tests:             10.6   10.47 )-----------( 366      ( 09-12 @ 08:45 )             34.3                10.3   11.30 )-----------( 400      ( 09-11 @ 05:00 )             33.5       09-12    144  |  109<H>  |  13  ----------------------------<  115<H>  4.1   |  26  |  0.53    Ca    8.6      12 Sep 2020 08:45        MEDICATIONS  (STANDING):  enoxaparin Injectable 40 milliGRAM(s) SubCutaneous daily  FLUoxetine 40 milliGRAM(s) Oral daily  ondansetron Injectable 4 milliGRAM(s) IV Push every 6 hours  pantoprazole    Tablet 40 milliGRAM(s) Oral before breakfast  risperiDONE   Tablet 1 milliGRAM(s) Oral daily  sodium chloride 0.9% lock flush 3 milliLiter(s) IV Push every 8 hours    MEDICATIONS  (PRN):  acetaminophen   Tablet .. 650 milliGRAM(s) Oral every 6 hours PRN Mild Pain (1 - 3)  morphine  - Injectable 2 milliGRAM(s) IV Push every 4 hours PRN Moderate Pain (4 - 6)

## 2020-09-14 ENCOUNTER — TRANSCRIPTION ENCOUNTER (OUTPATIENT)
Age: 45
End: 2020-09-14

## 2020-09-14 DIAGNOSIS — F20.0 PARANOID SCHIZOPHRENIA: ICD-10-CM

## 2020-09-14 DIAGNOSIS — Z29.9 ENCOUNTER FOR PROPHYLACTIC MEASURES, UNSPECIFIED: ICD-10-CM

## 2020-09-14 LAB
ANION GAP SERPL CALC-SCNC: 12 MMO/L — SIGNIFICANT CHANGE UP (ref 7–14)
APTT BLD: 29.7 SEC — SIGNIFICANT CHANGE UP (ref 27–36.3)
BASOPHILS # BLD AUTO: 0.03 K/UL — SIGNIFICANT CHANGE UP (ref 0–0.2)
BASOPHILS NFR BLD AUTO: 0.2 % — SIGNIFICANT CHANGE UP (ref 0–2)
BLD GP AB SCN SERPL QL: NEGATIVE — SIGNIFICANT CHANGE UP
BUN SERPL-MCNC: 8 MG/DL — SIGNIFICANT CHANGE UP (ref 7–23)
CALCIUM SERPL-MCNC: 8.1 MG/DL — LOW (ref 8.4–10.5)
CHLORIDE SERPL-SCNC: 102 MMOL/L — SIGNIFICANT CHANGE UP (ref 98–107)
CO2 SERPL-SCNC: 24 MMOL/L — SIGNIFICANT CHANGE UP (ref 22–31)
CREAT SERPL-MCNC: 0.52 MG/DL — SIGNIFICANT CHANGE UP (ref 0.5–1.3)
CULTURE RESULTS: SIGNIFICANT CHANGE UP
CULTURE RESULTS: SIGNIFICANT CHANGE UP
EOSINOPHIL # BLD AUTO: 0.21 K/UL — SIGNIFICANT CHANGE UP (ref 0–0.5)
EOSINOPHIL NFR BLD AUTO: 1.6 % — SIGNIFICANT CHANGE UP (ref 0–6)
FIBRINOGEN PPP-MCNC: 812 MG/DL — HIGH (ref 290–520)
GLUCOSE SERPL-MCNC: 98 MG/DL — SIGNIFICANT CHANGE UP (ref 70–99)
HCT VFR BLD CALC: 33.1 % — LOW (ref 34.5–45)
HGB BLD-MCNC: 10.4 G/DL — LOW (ref 11.5–15.5)
IMM GRANULOCYTES NFR BLD AUTO: 1.8 % — HIGH (ref 0–1.5)
INR BLD: 1.19 — HIGH (ref 0.88–1.16)
LYMPHOCYTES # BLD AUTO: 1.19 K/UL — SIGNIFICANT CHANGE UP (ref 1–3.3)
LYMPHOCYTES # BLD AUTO: 9.2 % — LOW (ref 13–44)
MAGNESIUM SERPL-MCNC: 2.1 MG/DL — SIGNIFICANT CHANGE UP (ref 1.6–2.6)
MCHC RBC-ENTMCNC: 28.3 PG — SIGNIFICANT CHANGE UP (ref 27–34)
MCHC RBC-ENTMCNC: 31.4 % — LOW (ref 32–36)
MCV RBC AUTO: 90.2 FL — SIGNIFICANT CHANGE UP (ref 80–100)
MONOCYTES # BLD AUTO: 0.77 K/UL — SIGNIFICANT CHANGE UP (ref 0–0.9)
MONOCYTES NFR BLD AUTO: 5.9 % — SIGNIFICANT CHANGE UP (ref 2–14)
NEUTROPHILS # BLD AUTO: 10.53 K/UL — HIGH (ref 1.8–7.4)
NEUTROPHILS NFR BLD AUTO: 81.3 % — HIGH (ref 43–77)
NRBC # FLD: 0 K/UL — SIGNIFICANT CHANGE UP (ref 0–0)
PHOSPHATE SERPL-MCNC: 2.7 MG/DL — SIGNIFICANT CHANGE UP (ref 2.5–4.5)
PLATELET # BLD AUTO: 348 K/UL — SIGNIFICANT CHANGE UP (ref 150–400)
PMV BLD: 9.9 FL — SIGNIFICANT CHANGE UP (ref 7–13)
POTASSIUM SERPL-MCNC: 3.9 MMOL/L — SIGNIFICANT CHANGE UP (ref 3.5–5.3)
POTASSIUM SERPL-SCNC: 3.9 MMOL/L — SIGNIFICANT CHANGE UP (ref 3.5–5.3)
PROTHROM AB SERPL-ACNC: 13.6 SEC — SIGNIFICANT CHANGE UP (ref 10.6–13.6)
RBC # BLD: 3.67 M/UL — LOW (ref 3.8–5.2)
RBC # FLD: 13.7 % — SIGNIFICANT CHANGE UP (ref 10.3–14.5)
RH IG SCN BLD-IMP: POSITIVE — SIGNIFICANT CHANGE UP
SODIUM SERPL-SCNC: 138 MMOL/L — SIGNIFICANT CHANGE UP (ref 135–145)
SPECIMEN SOURCE: SIGNIFICANT CHANGE UP
SPECIMEN SOURCE: SIGNIFICANT CHANGE UP
WBC # BLD: 12.96 K/UL — HIGH (ref 3.8–10.5)
WBC # FLD AUTO: 12.96 K/UL — HIGH (ref 3.8–10.5)

## 2020-09-14 PROCEDURE — 99223 1ST HOSP IP/OBS HIGH 75: CPT

## 2020-09-14 RX ORDER — NEOMYCIN SULFATE 500 MG/1
1000 TABLET ORAL ONCE
Refills: 0 | Status: COMPLETED | OUTPATIENT
Start: 2020-09-14 | End: 2020-09-14

## 2020-09-14 RX ORDER — POLYETHYLENE GLYCOL 3350 17 G/17G
17 POWDER, FOR SOLUTION ORAL ONCE
Refills: 0 | Status: COMPLETED | OUTPATIENT
Start: 2020-09-14 | End: 2020-09-14

## 2020-09-14 RX ORDER — ENOXAPARIN SODIUM 100 MG/ML
40 INJECTION SUBCUTANEOUS ONCE
Refills: 0 | Status: COMPLETED | OUTPATIENT
Start: 2020-09-14 | End: 2020-09-14

## 2020-09-14 RX ORDER — POLYETHYLENE GLYCOL 3350 17 G/17G
17 POWDER, FOR SOLUTION ORAL ONCE
Refills: 0 | Status: DISCONTINUED | OUTPATIENT
Start: 2020-09-14 | End: 2020-09-14

## 2020-09-14 RX ORDER — METRONIDAZOLE 500 MG
1000 TABLET ORAL ONCE
Refills: 0 | Status: COMPLETED | OUTPATIENT
Start: 2020-09-14 | End: 2020-09-14

## 2020-09-14 RX ORDER — PETROLATUM,WHITE
1 JELLY (GRAM) TOPICAL
Refills: 0 | Status: DISCONTINUED | OUTPATIENT
Start: 2020-09-14 | End: 2020-09-19

## 2020-09-14 RX ORDER — SODIUM CHLORIDE 9 MG/ML
1000 INJECTION, SOLUTION INTRAVENOUS
Refills: 0 | Status: DISCONTINUED | OUTPATIENT
Start: 2020-09-14 | End: 2020-09-16

## 2020-09-14 RX ADMIN — NEOMYCIN SULFATE 1000 MILLIGRAM(S): 500 TABLET ORAL at 12:01

## 2020-09-14 RX ADMIN — POLYETHYLENE GLYCOL 3350 17 GRAM(S): 17 POWDER, FOR SOLUTION ORAL at 14:20

## 2020-09-14 RX ADMIN — SODIUM CHLORIDE 50 MILLILITER(S): 9 INJECTION, SOLUTION INTRAVENOUS at 23:09

## 2020-09-14 RX ADMIN — POLYETHYLENE GLYCOL 3350 17 GRAM(S): 17 POWDER, FOR SOLUTION ORAL at 18:20

## 2020-09-14 RX ADMIN — PANTOPRAZOLE SODIUM 40 MILLIGRAM(S): 20 TABLET, DELAYED RELEASE ORAL at 05:45

## 2020-09-14 RX ADMIN — POLYETHYLENE GLYCOL 3350 17 GRAM(S): 17 POWDER, FOR SOLUTION ORAL at 20:15

## 2020-09-14 RX ADMIN — Medication 10 MILLIGRAM(S): at 13:53

## 2020-09-14 RX ADMIN — POLYETHYLENE GLYCOL 3350 17 GRAM(S): 17 POWDER, FOR SOLUTION ORAL at 17:42

## 2020-09-14 RX ADMIN — POLYETHYLENE GLYCOL 3350 17 GRAM(S): 17 POWDER, FOR SOLUTION ORAL at 13:54

## 2020-09-14 RX ADMIN — NEOMYCIN SULFATE 1000 MILLIGRAM(S): 500 TABLET ORAL at 14:23

## 2020-09-14 RX ADMIN — ENOXAPARIN SODIUM 40 MILLIGRAM(S): 100 INJECTION SUBCUTANEOUS at 12:01

## 2020-09-14 RX ADMIN — Medication 40 MILLIGRAM(S): at 12:01

## 2020-09-14 RX ADMIN — SODIUM CHLORIDE 3 MILLILITER(S): 9 INJECTION INTRAMUSCULAR; INTRAVENOUS; SUBCUTANEOUS at 22:00

## 2020-09-14 RX ADMIN — Medication 10 MILLIGRAM(S): at 20:14

## 2020-09-14 RX ADMIN — SODIUM CHLORIDE 3 MILLILITER(S): 9 INJECTION INTRAMUSCULAR; INTRAVENOUS; SUBCUTANEOUS at 13:47

## 2020-09-14 RX ADMIN — POLYETHYLENE GLYCOL 3350 17 GRAM(S): 17 POWDER, FOR SOLUTION ORAL at 22:57

## 2020-09-14 RX ADMIN — Medication 1000 MILLIGRAM(S): at 12:02

## 2020-09-14 RX ADMIN — RISPERIDONE 1 MILLIGRAM(S): 4 TABLET ORAL at 12:02

## 2020-09-14 RX ADMIN — SODIUM CHLORIDE 3 MILLILITER(S): 9 INJECTION INTRAMUSCULAR; INTRAVENOUS; SUBCUTANEOUS at 05:44

## 2020-09-14 RX ADMIN — ONDANSETRON 4 MILLIGRAM(S): 8 TABLET, FILM COATED ORAL at 12:01

## 2020-09-14 RX ADMIN — Medication 1000 MILLIGRAM(S): at 14:23

## 2020-09-14 NOTE — CONSULT NOTE ADULT - SUBJECTIVE AND OBJECTIVE BOX
HPI: 45F schizophrenia, anxiety, depression admitted to [facility] with abdominal pain, found to have a complex pelvic mass with suspected peritoneal carcinomatosis concerning for ovarian malignancy. Transferred to [facility] for possible surgical management. Pt currently denies further abdominal pain, states analgesia has given her relief. Tolerating diet without N/V, dysphagia. Pt continues to have regular stools and denies blood in stool/per rectum. Pt denies chest pain, SOB, palpitations, lightheadedness. Ambulating in room without difficulty. Only symptoms of note today is a sensation of "liquid" in her left thigh (though she denies the thigh is swollen). No tenderness, no bruising.    PAST MEDICAL HISTORY:  Anxiety   H/O pulmonary valve stenosis   Heart murmur   Paranoid schizophrenia.     PAST SURGICAL HISTORY:  No significant past surgical history.     MEDICATIONS:  Prozac, Risperdal, reports compliance    ALLERGIES:  NSAIDs (angioedema)    FAMILY HISTORY:  DM  Unspecified skin cancer.    SOCIAL HISTORY:  Denies tobacco, EtOH, recreational drugs; not sexually active    HCM: last pap 7/23/2018 NIL. last mammo 1 year ago wnl per patient, never had colonoscopy prior to hospitalization      All other review of systems negative, except as noted in HPI    PHYSICAL EXAM:  Vital Signs Last 24 Hrs  T(C): 36.8 (14 Sep 2020 09:03), Max: 36.9 (13 Sep 2020 16:37)  T(F): 98.3 (14 Sep 2020 09:03), Max: 98.4 (13 Sep 2020 16:37)  HR: 83 (14 Sep 2020 09:03) (83 - 92)  BP: 125/63 (14 Sep 2020 09:03) (120/71 - 131/67)  BP(mean): 78 (14 Sep 2020 09:03) (78 - 78)  RR: 18 (14 Sep 2020 09:03) (15 - 18)  SpO2: 95% (14 Sep 2020 09:03) (93% - 96%)  CONSTITUTIONAL: NAD, well-developed, well-groomed  EYES: PERRLA; conjunctiva and sclera clear  ENMT: Moist oral mucosa, no pharyngeal injection or exudates; normal dentition  NECK: Supple, no palpable masses; no thyromegaly  RESPIRATORY: Normal respiratory effort; lungs are clear to auscultation bilaterally  CARDIOVASCULAR: Regular rate and rhythm, normal S1 and S2, no murmur/rub/gallop; No lower extremity edema; Peripheral pulses are 2+ bilaterally  ABDOMEN: Nontender to palpation, normoactive bowel sounds, no rebound/guarding; No hepatosplenomegaly  MUSCULOSKELETAL:  Normal gait; no clubbing or cyanosis of digits; no joint swelling or tenderness to palpation  PSYCH: A+O to person, place, and time; limited affect  NEUROLOGY: CN 2-12 are intact and symmetric; no gross sensory deficits   SKIN: No rashes; no palpable lesions    LABS:                        10.4   12.96 )-----------( 348      ( 14 Sep 2020 08:45 )             33.1     09-14    138  |  102  |  8   ----------------------------<  98  3.9   |  24  |  0.52    Ca    8.1<L>      14 Sep 2020 08:45  Phos  2.7     09-14  Mg     2.1     09-14      PT/INR - ( 14 Sep 2020 08:45 )   PT: 13.6 SEC;   INR: 1.19          PTT - ( 14 Sep 2020 08:45 )  PTT:29.7 SEC

## 2020-09-14 NOTE — CONSULT NOTE ADULT - ASSESSMENT
45F schizophrenia, anxiety, depression found to have a complex pelvic mass with suspected peritoneal carcinomatosis concerning for ovarian malignancy.

## 2020-09-14 NOTE — CONSULT NOTE ADULT - PROBLEM SELECTOR RECOMMENDATION 2
Calm without behavioral disturbance. No complaint of anxiety or depressed mood. Continue risperdal and prozac.

## 2020-09-14 NOTE — PROGRESS NOTE ADULT - SUBJECTIVE AND OBJECTIVE BOX
Gyn ONC Progress Note HD#__ POD#__     Patient seen and examined at bedside. No acute events overnight. She continues to report adequate pain control. She notes intermittent but unchanged left lumbar discomfort. Patient is tolerating a regular diet without nausea or emesis. She is ambulating and passing flatus. Tolerating regular diet without nausea or emesis. Pt denies fever, chills, chest pain, SOB, lightheadedness, dizziness.      Objective:  T(F): 98.1 (09-14-20 @ 05:43), Max: 98.4 (09-13-20 @ 16:37)  HR: 84 (09-14-20 @ 05:43) (84 - 92)  BP: 131/65 (09-14-20 @ 05:43) (120/71 - 131/67)  RR: 18 (09-14-20 @ 05:43) (15 - 18)  SpO2: 94% (09-14-20 @ 05:43) (93% - 96%)    I&O's Summary  12 Sep 2020 07:01  -  13 Sep 2020 07:00  --------------------------------------------------------  IN: 0 mL / OUT: 1900 mL / NET: -1900 mL    13 Sep 2020 07:01  -  14 Sep 2020 05:50  --------------------------------------------------------  IN: 0 mL / OUT: 850 mL / NET: -850 mL    MEDICATIONS  (STANDING):  enoxaparin Injectable 40 milliGRAM(s) SubCutaneous daily  FLUoxetine 40 milliGRAM(s) Oral daily  ondansetron Injectable 4 milliGRAM(s) IV Push every 6 hours  pantoprazole    Tablet 40 milliGRAM(s) Oral before breakfast  risperiDONE   Tablet 1 milliGRAM(s) Oral daily  sodium chloride 0.9% lock flush 3 milliLiter(s) IV Push every 8 hours    MEDICATIONS  (PRN):  acetaminophen   Tablet .. 650 milliGRAM(s) Oral every 6 hours PRN Mild Pain (1 - 3)  morphine  - Injectable 2 milliGRAM(s) IV Push every 4 hours PRN Moderate Pain (4 - 6)      Physical Exam:  Constitutional: NAD, A+O x3  CV: RRR  Lungs: Clear to auscultation bilaterally  Abdomen: Softly distended, minimally tender to palpation at the midline. No rebound tenderness or guarding. + Bowel sounds.   Incision: Clean, dry, intact  Extremities: No lower extremity edema or calf tenderness bilaterally; Venodynes in place   Gyn ONC Progress Note    Patient seen and examined at bedside. No acute events overnight. She continues to report adequate pain control. She notes intermittent but unchanged left lumbar discomfort. Patient is tolerating a regular diet without nausea or emesis. She is ambulating and passing flatus. Tolerating regular diet without nausea or emesis. Pt denies fever, chills, chest pain, SOB, lightheadedness, dizziness.      Objective:  T(F): 98.1 (09-14-20 @ 05:43), Max: 98.4 (09-13-20 @ 16:37)  HR: 84 (09-14-20 @ 05:43) (84 - 92)  BP: 131/65 (09-14-20 @ 05:43) (120/71 - 131/67)  RR: 18 (09-14-20 @ 05:43) (15 - 18)  SpO2: 94% (09-14-20 @ 05:43) (93% - 96%)    I&O's Summary  12 Sep 2020 07:01  -  13 Sep 2020 07:00  --------------------------------------------------------  IN: 0 mL / OUT: 1900 mL / NET: -1900 mL    13 Sep 2020 07:01  -  14 Sep 2020 05:50  --------------------------------------------------------  IN: 0 mL / OUT: 850 mL / NET: -850 mL    MEDICATIONS  (STANDING):  enoxaparin Injectable 40 milliGRAM(s) SubCutaneous daily  FLUoxetine 40 milliGRAM(s) Oral daily  ondansetron Injectable 4 milliGRAM(s) IV Push every 6 hours  pantoprazole    Tablet 40 milliGRAM(s) Oral before breakfast  risperiDONE   Tablet 1 milliGRAM(s) Oral daily  sodium chloride 0.9% lock flush 3 milliLiter(s) IV Push every 8 hours    MEDICATIONS  (PRN):  acetaminophen   Tablet .. 650 milliGRAM(s) Oral every 6 hours PRN Mild Pain (1 - 3)  morphine  - Injectable 2 milliGRAM(s) IV Push every 4 hours PRN Moderate Pain (4 - 6)      Physical Exam:  Constitutional: NAD, A+O x3  CV: RRR  Lungs: Clear to auscultation bilaterally  Abdomen: Softly distended, minimally tender to palpation at the midline. No rebound tenderness or guarding. + Bowel sounds.   Incision: Clean, dry, intact  Extremities: No lower extremity edema or calf tenderness bilaterally; Venodynes in place   Gyn ONC Progress Note    Patient seen and examined at bedside. No acute events overnight. She continues to report adequate pain control. She notes intermittent but unchanged left lumbar discomfort. Patient is tolerating a regular diet without nausea or emesis. She is ambulating and passing flatus. Tolerating regular diet without nausea or emesis. Pt denies fever, chills, chest pain, SOB, lightheadedness, dizziness.      Objective:  T(F): 98.1 (09-14-20 @ 05:43), Max: 98.4 (09-13-20 @ 16:37)  HR: 84 (09-14-20 @ 05:43) (84 - 92)  BP: 131/65 (09-14-20 @ 05:43) (120/71 - 131/67)  RR: 18 (09-14-20 @ 05:43) (15 - 18)  SpO2: 94% (09-14-20 @ 05:43) (93% - 96%)    I&O's Summary  12 Sep 2020 07:01  -  13 Sep 2020 07:00  --------------------------------------------------------  IN: 0 mL / OUT: 1900 mL / NET: -1900 mL    13 Sep 2020 07:01  -  14 Sep 2020 05:50  --------------------------------------------------------  IN: 0 mL / OUT: 850 mL / NET: -850 mL    MEDICATIONS  (STANDING):  enoxaparin Injectable 40 milliGRAM(s) SubCutaneous daily  FLUoxetine 40 milliGRAM(s) Oral daily  ondansetron Injectable 4 milliGRAM(s) IV Push every 6 hours  pantoprazole    Tablet 40 milliGRAM(s) Oral before breakfast  risperiDONE   Tablet 1 milliGRAM(s) Oral daily  sodium chloride 0.9% lock flush 3 milliLiter(s) IV Push every 8 hours    MEDICATIONS  (PRN):  acetaminophen   Tablet .. 650 milliGRAM(s) Oral every 6 hours PRN Mild Pain (1 - 3)  morphine  - Injectable 2 milliGRAM(s) IV Push every 4 hours PRN Moderate Pain (4 - 6)      Physical Exam:  Constitutional: NAD, A+O x3  CV: RRR  Lungs: Clear to auscultation bilaterally  Abdomen: Softly distended, minimally tender to palpation at the midline. No rebound tenderness or guarding. + Bowel sounds.   Incision: Clean, dry, intact  Extremities: No lower extremity edema or calf tenderness bilaterally

## 2020-09-14 NOTE — CONSULT NOTE ADULT - REASON FOR ADMISSION
concern for ovarian malignancy

## 2020-09-14 NOTE — PROGRESS NOTE ADULT - SUBJECTIVE AND OBJECTIVE BOX
Subjective: Patient seen and examined. No new events except as noted.     REVIEW OF SYSTEMS:    CONSTITUTIONAL: No weakness, fevers or chills  EYES/ENT: No visual changes;  No vertigo or throat pain   NECK: No pain or stiffness  RESPIRATORY: No cough, wheezing, hemoptysis; No shortness of breath  CARDIOVASCULAR: No chest pain or palpitations  GASTROINTESTINAL: No abdominal or epigastric pain. No nausea, vomiting, or hematemesis; No diarrhea or constipation. No melena or hematochezia.  GENITOURINARY: No dysuria, frequency or hematuria  NEUROLOGICAL: No numbness or weakness  SKIN: No itching, burning, rashes, or lesions   All other review of systems is negative unless indicated above.    MEDICATIONS:  MEDICATIONS  (STANDING):  bisacodyl 10 milliGRAM(s) Oral once  bisacodyl 10 milliGRAM(s) Oral once  FLUoxetine 40 milliGRAM(s) Oral daily  metroNIDAZOLE    Tablet 1000 milliGRAM(s) Oral once  metroNIDAZOLE    Tablet 1000 milliGRAM(s) Oral once  neomycin 1000 milliGRAM(s) Oral once  neomycin 1000 milliGRAM(s) Oral once  ondansetron Injectable 4 milliGRAM(s) IV Push every 6 hours  pantoprazole    Tablet 40 milliGRAM(s) Oral before breakfast  polyethylene glycol 3350 17 Gram(s) Oral once  polyethylene glycol 3350 17 Gram(s) Oral once  polyethylene glycol 3350 17 Gram(s) Oral once  polyethylene glycol 3350 17 Gram(s) Oral once  polyethylene glycol 3350 17 Gram(s) Oral once  polyethylene glycol 3350 17 Gram(s) Oral once  polyethylene glycol 3350 17 Gram(s) Oral once  polyethylene glycol 3350 17 Gram(s) Oral once  risperiDONE   Tablet 1 milliGRAM(s) Oral daily  sodium chloride 0.9% lock flush 3 milliLiter(s) IV Push every 8 hours      PHYSICAL EXAM:  T(C): 36.8 (09-14-20 @ 09:03), Max: 36.9 (09-13-20 @ 16:37)  HR: 83 (09-14-20 @ 09:03) (83 - 92)  BP: 125/63 (09-14-20 @ 09:03) (120/71 - 131/67)  RR: 18 (09-14-20 @ 09:03) (15 - 18)  SpO2: 95% (09-14-20 @ 09:03) (93% - 96%)  Wt(kg): --  I&O's Summary    13 Sep 2020 07:01  -  14 Sep 2020 07:00  --------------------------------------------------------  IN: 0 mL / OUT: 1200 mL / NET: -1200 mL          Appearance: Normal	  HEENT:   Normal oral mucosa, PERRL, EOMI	  Lymphatic: No lymphadenopathy , no edema  Cardiovascular: Normal S1 S2, No JVD, No murmurs , Peripheral pulses palpable 2+ bilaterally  Respiratory: Lungs clear to auscultation, normal effort 	  Gastrointestinal:  Soft, Non-tender, + BS	  Skin: No rashes, No ecchymoses, No cyanosis, warm to touch  Musculoskeletal: Normal range of motion, normal strength  Psychiatry:  Mood & affect appropriate  Ext: No edema      LABS:    CARDIAC MARKERS:                                10.4   12.96 )-----------( 348      ( 14 Sep 2020 08:45 )             33.1           proBNP:   Lipid Profile:   HgA1c:   TSH:             TELEMETRY: 	    ECG:  	  RADIOLOGY:   DIAGNOSTIC TESTING:  [ ] Echocardiogram:  [ ]  Catheterization:  [ ] Stress Test:    OTHER:

## 2020-09-14 NOTE — PROGRESS NOTE ADULT - SUBJECTIVE AND OBJECTIVE BOX
INTERVAL HPI/OVERNIGHT EVENTS:    (+) flatus  denied constipation   no n/v    MEDICATIONS  (STANDING):  bisacodyl 10 milliGRAM(s) Oral once  bisacodyl 10 milliGRAM(s) Oral once  enoxaparin Injectable 40 milliGRAM(s) SubCutaneous once  FLUoxetine 40 milliGRAM(s) Oral daily  metroNIDAZOLE    Tablet 1000 milliGRAM(s) Oral once  metroNIDAZOLE    Tablet 1000 milliGRAM(s) Oral once  neomycin 1000 milliGRAM(s) Oral once  neomycin 1000 milliGRAM(s) Oral once  ondansetron Injectable 4 milliGRAM(s) IV Push every 6 hours  pantoprazole    Tablet 40 milliGRAM(s) Oral before breakfast  polyethylene glycol 3350 17 Gram(s) Oral once  polyethylene glycol 3350 17 Gram(s) Oral once  polyethylene glycol 3350 17 Gram(s) Oral once  polyethylene glycol 3350 17 Gram(s) Oral once  polyethylene glycol 3350 17 Gram(s) Oral once  polyethylene glycol 3350 17 Gram(s) Oral once  polyethylene glycol 3350 17 Gram(s) Oral once  polyethylene glycol 3350 17 Gram(s) Oral once  risperiDONE   Tablet 1 milliGRAM(s) Oral daily  sodium chloride 0.9% lock flush 3 milliLiter(s) IV Push every 8 hours    MEDICATIONS  (PRN):  acetaminophen   Tablet .. 650 milliGRAM(s) Oral every 6 hours PRN Mild Pain (1 - 3)  morphine  - Injectable 2 milliGRAM(s) IV Push every 4 hours PRN Moderate Pain (4 - 6)  petrolatum white Ointment 1 Application(s) Topical four times a day PRN irritation      Allergies    ibuprofen (Other)  NSAIDs (Unknown)    Intolerances        Review of Systems:    General:  No wt loss, fevers, chills, night sweats, fatigue   Eyes:  Good vision, no reported pain  ENT:  No sore throat, pain, runny nose, dysphagia  CV:  No pain, palpitations, hypo/hypertension  Resp:  No dyspnea, cough, tachypnea, wheezing  GI:  No pain, No nausea, No vomiting, No diarrhea, No constipation, No weight loss, No fever, No pruritis, No rectal bleeding, No melena, No dysphagia  :  No pain, bleeding, incontinence, nocturia  Muscle:  No pain, weakness  Neuro:  No weakness, tingling, memory problems  Psych:  No fatigue, insomnia, mood problems, depression  Endocrine:  No polyuria, polydypsia, cold/heat intolerance  Heme:  No petechiae, ecchymosis, easy bruisability  Skin:  No rash, tattoos, scars, edema      Vital Signs Last 24 Hrs  T(C): 36.8 (14 Sep 2020 09:03), Max: 36.9 (13 Sep 2020 16:37)  T(F): 98.3 (14 Sep 2020 09:03), Max: 98.4 (13 Sep 2020 16:37)  HR: 83 (14 Sep 2020 09:03) (83 - 92)  BP: 125/63 (14 Sep 2020 09:03) (120/71 - 131/67)  BP(mean): 78 (14 Sep 2020 09:03) (78 - 78)  RR: 18 (14 Sep 2020 09:03) (15 - 18)  SpO2: 95% (14 Sep 2020 09:03) (93% - 96%)    PHYSICAL EXAM:    Constitutional: NAD  HEENT: EOMI, throat clear  Neck: No LAD, supple  Respiratory: CTA and P  Cardiovascular: S1 and S2, RRR, no M  Gastrointestinal: BS+, soft, NT/ND, neg HSM,  Extremities: No peripheral edema, neg clubbing, cyanosis  Vascular: 2+ peripheral pulses  Neurological: A/O x 3, no focal deficits  Psychiatric: Normal mood, normal affect  Skin: No rashes      LABS:                        10.4   12.96 )-----------( 348      ( 14 Sep 2020 08:45 )             33.1     09-14    138  |  102  |  8   ----------------------------<  98  3.9   |  24  |  0.52    Ca    8.1<L>      14 Sep 2020 08:45  Phos  2.7     09-14  Mg     2.1     09-14      PT/INR - ( 14 Sep 2020 08:45 )   PT: 13.6 SEC;   INR: 1.19          PTT - ( 14 Sep 2020 08:45 )  PTT:29.7 SEC      RADIOLOGY & ADDITIONAL TESTS:

## 2020-09-14 NOTE — PROGRESS NOTE ADULT - SUBJECTIVE AND OBJECTIVE BOX
Ms. Catalan is comfortable in bed  Denies nausea or vomiting  Denies fever or chills  Started bowel prep    Vital Signs Last 24 Hrs  T(C): 36.7 (14 Sep 2020 12:34), Max: 36.9 (13 Sep 2020 16:37)  T(F): 98.1 (14 Sep 2020 12:34), Max: 98.4 (13 Sep 2020 16:37)  HR: 91 (14 Sep 2020 12:34) (83 - 92)  BP: 129/74 (14 Sep 2020 12:34) (125/63 - 131/67)  BP(mean): 78 (14 Sep 2020 09:03) (78 - 78)  RR: 16 (14 Sep 2020 12:34) (16 - 18)  SpO2: 96% (14 Sep 2020 12:34) (93% - 96%)    On exam: awake, alert  Breathing comfortably on room air  Abd is soft, no tender and not distended  No rebound, no guarding                            10.4   12.96 )-----------( 348      ( 14 Sep 2020 08:45 )             33.1   09-14    138  |  102  |  8   ----------------------------<  98  3.9   |  24  |  0.52

## 2020-09-14 NOTE — PROGRESS NOTE ADULT - ASSESSMENT
46y/o F transferred from [facility] after presenting with a partial SBO in the context of a known complex multiseptated pelvic mass with associated peritoneal carcinomatosis, suspicious for possible ovarian malignancy. ( 211,  434, CEA <0.6). CT chest without evidence of metastatic disease. Throughout the course of her admission, patients abdominal tenderness has improved. She is s/p Endoscopy and Colonoscopy on 9/11 significant for a 3cm hiatal hernia, LA grade D esophagitis, gastritis (bx'd), few gastric polyps (bx'd), and a 3mm polyp in the descending colon (resected). Patient has remained with stable vitals, and is now passing flatus and bowel movements.

## 2020-09-14 NOTE — CONSULT NOTE ADULT - PROBLEM SELECTOR RECOMMENDATION 9
With likely carcinomatosis and ascites. Elevated tumor markers raise concern for ovarian source. EGD showing esophagitis/gastritis and colonoscopy a 3mm polyp.  - NPO at midnight for OR tomorrow; reported history of pulmonic stenosis but none on TTE in-house. May proceed to OR without further cardiac testing.  - Abd pain controlled; continue current analgesia and monitor symptoms  - Reported left thigh symptoms, without exam findings. If swelling, ecchomosis, or venous dilatation develops would check LE dopplers to rule out DVT.

## 2020-09-14 NOTE — PROGRESS NOTE ADULT - PROBLEM SELECTOR PLAN 1
Neuro: Pain well controlled. Continue Tylenol with Morphine PRN.   Psych: PMHx of paranoid schizophrenia, anxiety and depression: Continue home Risperdal and Prozac  - Appreciate psych recs (9/12: c/w current meds, no further role for medication optimization)  CV: Hemodynamically stable, s/p lab holiday 9/13. Will f/u AM CBC.   - PMHx of pulmonic stenosis: TTE (9/10) showing normal pulmonic valve and mild to moderate TR; Cardiology stating that patient is an acceptable surgical candidate from cardiac perspective. Appreciate Cards recs.  Pulm: Saturating well on RA. Increase incentive spirometry.  FEN: SLIV. Will f/u AM BMP/Mg/Phos.   GI: Tolerating regular diet without issue. Continue Protonix 40mg PO qD, with standing Zofran q6 for nausea;  - To be ordered for bowel prep this evening in anticipation of OR tomorrow. NPO at midnight.   - s/p Endoscopy/Colonoscopy (9/11): No infiltration or masses seen on colonoscopy; will f/u bx of esophagitis, gastric polyps, and 3mm colonic polyp.   : Voiding freely, no active issues.   ID: No active issues. COVID negative (9/13)  Heme: Continue Lovenox and Venodynes for DVT ppx. Increase ambulation. T&S ordered this morning.    Dispo: Continue inpatient management, will follow-up morning labs. Patient to initiate bowel prep this evening, NPO at midnight in anticipation of OR tomorrow morning 9/15.    Patient seen and evaluated with GYN Oncology team.   Mauricio, PGY-2 Neuro: Pain well controlled. Continue Tylenol with Morphine PRN.   Psych: PMHx of paranoid schizophrenia, anxiety and depression: Continue home Risperdal and Prozac  - Appreciate psych recs (9/12: c/w current meds, no further role for medication optimization)  CV: Hemodynamically stable, s/p lab holiday 9/13. Will f/u AM CBC.   - PMHx of pulmonic stenosis: TTE (9/10) showing normal pulmonic valve and mild to moderate TR; Cardiology stating that patient is an acceptable surgical candidate from cardiac perspective. Appreciate Cards recs.  Pulm: Saturating well on RA. Increase incentive spirometry.  FEN: SLIV. Will f/u AM BMP/Mg/Phos.   GI: Tolerating regular diet without issue. Continue Protonix 40mg PO qD, with standing Zofran q6 for nausea;  - Bowel prep today in anticipation of OR tomorrow. NPO at midnight.   - s/p Endoscopy/Colonoscopy (9/11): No infiltration or masses seen on colonoscopy; will f/u bx of esophagitis, gastric polyps, and 3mm colonic polyp.   : Voiding freely, no active issues.   ID: No active issues. COVID negative (9/13)  Heme: Continue Lovenox and Venodynes for DVT ppx. Increase ambulation. T&S ordered this morning.    Dispo: Continue inpatient management, will follow-up morning labs. Patient to initiate bowel prep today, NPO at midnight in anticipation of OR tomorrow morning 9/15.    Patient seen and evaluated with GYN Oncology team.   Mauricio, PGY-2

## 2020-09-15 ENCOUNTER — RESULT REVIEW (OUTPATIENT)
Age: 45
End: 2020-09-15

## 2020-09-15 LAB
ANION GAP SERPL CALC-SCNC: 11 MMO/L — SIGNIFICANT CHANGE UP (ref 7–14)
BASE EXCESS BLDA CALC-SCNC: -0.6 MMOL/L — SIGNIFICANT CHANGE UP
BASE EXCESS BLDA CALC-SCNC: -2.5 MMOL/L — SIGNIFICANT CHANGE UP
BASE EXCESS BLDA CALC-SCNC: 1.4 MMOL/L — SIGNIFICANT CHANGE UP
BASOPHILS # BLD AUTO: 0.04 K/UL — SIGNIFICANT CHANGE UP (ref 0–0.2)
BASOPHILS NFR BLD AUTO: 0.4 % — SIGNIFICANT CHANGE UP (ref 0–2)
BUN SERPL-MCNC: 6 MG/DL — LOW (ref 7–23)
CA-I BLDA-SCNC: 1.12 MMOL/L — LOW (ref 1.15–1.29)
CA-I BLDA-SCNC: 1.14 MMOL/L — LOW (ref 1.15–1.29)
CA-I BLDA-SCNC: 1.16 MMOL/L — SIGNIFICANT CHANGE UP (ref 1.15–1.29)
CALCIUM SERPL-MCNC: 8.2 MG/DL — LOW (ref 8.4–10.5)
CHLORIDE SERPL-SCNC: 103 MMOL/L — SIGNIFICANT CHANGE UP (ref 98–107)
CO2 SERPL-SCNC: 24 MMOL/L — SIGNIFICANT CHANGE UP (ref 22–31)
CREAT SERPL-MCNC: 0.49 MG/DL — LOW (ref 0.5–1.3)
EOSINOPHIL # BLD AUTO: 0.24 K/UL — SIGNIFICANT CHANGE UP (ref 0–0.5)
EOSINOPHIL NFR BLD AUTO: 2.3 % — SIGNIFICANT CHANGE UP (ref 0–6)
GLUCOSE BLDA-MCNC: 102 MG/DL — HIGH (ref 70–99)
GLUCOSE BLDA-MCNC: 132 MG/DL — HIGH (ref 70–99)
GLUCOSE BLDA-MCNC: 150 MG/DL — HIGH (ref 70–99)
GLUCOSE SERPL-MCNC: 113 MG/DL — HIGH (ref 70–99)
HCG SERPL-ACNC: < 5 MIU/ML — SIGNIFICANT CHANGE UP
HCO3 BLDA-SCNC: 22 MMOL/L — SIGNIFICANT CHANGE UP (ref 22–26)
HCO3 BLDA-SCNC: 24 MMOL/L — SIGNIFICANT CHANGE UP (ref 22–26)
HCO3 BLDA-SCNC: 26 MMOL/L — SIGNIFICANT CHANGE UP (ref 22–26)
HCT VFR BLD CALC: 35.1 % — SIGNIFICANT CHANGE UP (ref 34.5–45)
HCT VFR BLDA CALC: 27.3 % — LOW (ref 34.5–46.5)
HCT VFR BLDA CALC: 32.1 % — LOW (ref 34.5–46.5)
HCT VFR BLDA CALC: 32.7 % — LOW (ref 34.5–46.5)
HGB BLD-MCNC: 10.6 G/DL — LOW (ref 11.5–15.5)
HGB BLDA-MCNC: 10.4 G/DL — LOW (ref 11.5–15.5)
HGB BLDA-MCNC: 10.6 G/DL — LOW (ref 11.5–15.5)
HGB BLDA-MCNC: 8.8 G/DL — LOW (ref 11.5–15.5)
IMM GRANULOCYTES NFR BLD AUTO: 2 % — HIGH (ref 0–1.5)
LYMPHOCYTES # BLD AUTO: 1.02 K/UL — SIGNIFICANT CHANGE UP (ref 1–3.3)
LYMPHOCYTES # BLD AUTO: 9.8 % — LOW (ref 13–44)
MAGNESIUM SERPL-MCNC: 2.2 MG/DL — SIGNIFICANT CHANGE UP (ref 1.6–2.6)
MCHC RBC-ENTMCNC: 27.9 PG — SIGNIFICANT CHANGE UP (ref 27–34)
MCHC RBC-ENTMCNC: 30.2 % — LOW (ref 32–36)
MCV RBC AUTO: 92.4 FL — SIGNIFICANT CHANGE UP (ref 80–100)
MONOCYTES # BLD AUTO: 0.72 K/UL — SIGNIFICANT CHANGE UP (ref 0–0.9)
MONOCYTES NFR BLD AUTO: 6.9 % — SIGNIFICANT CHANGE UP (ref 2–14)
NEUTROPHILS # BLD AUTO: 8.14 K/UL — HIGH (ref 1.8–7.4)
NEUTROPHILS NFR BLD AUTO: 78.6 % — HIGH (ref 43–77)
NRBC # FLD: 0 K/UL — SIGNIFICANT CHANGE UP (ref 0–0)
PCO2 BLDA: 35 MMHG — SIGNIFICANT CHANGE UP (ref 32–48)
PCO2 BLDA: 40 MMHG — SIGNIFICANT CHANGE UP (ref 32–48)
PCO2 BLDA: 41 MMHG — SIGNIFICANT CHANGE UP (ref 32–48)
PH BLDA: 7.35 PH — SIGNIFICANT CHANGE UP (ref 7.35–7.45)
PH BLDA: 7.42 PH — SIGNIFICANT CHANGE UP (ref 7.35–7.45)
PH BLDA: 7.43 PH — SIGNIFICANT CHANGE UP (ref 7.35–7.45)
PHOSPHATE SERPL-MCNC: 3.1 MG/DL — SIGNIFICANT CHANGE UP (ref 2.5–4.5)
PLATELET # BLD AUTO: 363 K/UL — SIGNIFICANT CHANGE UP (ref 150–400)
PMV BLD: 10.4 FL — SIGNIFICANT CHANGE UP (ref 7–13)
PO2 BLDA: 109 MMHG — HIGH (ref 83–108)
PO2 BLDA: 128 MMHG — HIGH (ref 83–108)
PO2 BLDA: 208 MMHG — HIGH (ref 83–108)
POTASSIUM BLDA-SCNC: 3.7 MMOL/L — SIGNIFICANT CHANGE UP (ref 3.4–4.5)
POTASSIUM BLDA-SCNC: 4.1 MMOL/L — SIGNIFICANT CHANGE UP (ref 3.4–4.5)
POTASSIUM BLDA-SCNC: 4.2 MMOL/L — SIGNIFICANT CHANGE UP (ref 3.4–4.5)
POTASSIUM SERPL-MCNC: 4 MMOL/L — SIGNIFICANT CHANGE UP (ref 3.5–5.3)
POTASSIUM SERPL-SCNC: 4 MMOL/L — SIGNIFICANT CHANGE UP (ref 3.5–5.3)
RBC # BLD: 3.8 M/UL — SIGNIFICANT CHANGE UP (ref 3.8–5.2)
RBC # FLD: 13.7 % — SIGNIFICANT CHANGE UP (ref 10.3–14.5)
SAO2 % BLDA: 98.2 % — SIGNIFICANT CHANGE UP (ref 95–99)
SAO2 % BLDA: 98.8 % — SIGNIFICANT CHANGE UP (ref 95–99)
SAO2 % BLDA: 99.4 % — HIGH (ref 95–99)
SODIUM BLDA-SCNC: 134 MMOL/L — LOW (ref 136–146)
SODIUM BLDA-SCNC: 137 MMOL/L — SIGNIFICANT CHANGE UP (ref 136–146)
SODIUM BLDA-SCNC: 138 MMOL/L — SIGNIFICANT CHANGE UP (ref 136–146)
SODIUM SERPL-SCNC: 138 MMOL/L — SIGNIFICANT CHANGE UP (ref 135–145)
WBC # BLD: 10.37 K/UL — SIGNIFICANT CHANGE UP (ref 3.8–10.5)
WBC # FLD AUTO: 10.37 K/UL — SIGNIFICANT CHANGE UP (ref 3.8–10.5)

## 2020-09-15 PROCEDURE — 88341 IMHCHEM/IMCYTCHM EA ADD ANTB: CPT | Mod: 26,59

## 2020-09-15 PROCEDURE — 88112 CYTOPATH CELL ENHANCE TECH: CPT | Mod: 26

## 2020-09-15 PROCEDURE — 88305 TISSUE EXAM BY PATHOLOGIST: CPT | Mod: 26,59

## 2020-09-15 PROCEDURE — 49320 DIAG LAPARO SEPARATE PROC: CPT | Mod: 59

## 2020-09-15 PROCEDURE — 58150 TOTAL HYSTERECTOMY: CPT | Mod: 22

## 2020-09-15 PROCEDURE — 88305 TISSUE EXAM BY PATHOLOGIST: CPT | Mod: 26

## 2020-09-15 PROCEDURE — 88342 IMHCHEM/IMCYTCHM 1ST ANTB: CPT | Mod: 26,59

## 2020-09-15 PROCEDURE — 88307 TISSUE EXAM BY PATHOLOGIST: CPT | Mod: 26

## 2020-09-15 PROCEDURE — 88304 TISSUE EXAM BY PATHOLOGIST: CPT | Mod: 26

## 2020-09-15 RX ORDER — HEPARIN SODIUM 5000 [USP'U]/ML
5000 INJECTION INTRAVENOUS; SUBCUTANEOUS EVERY 8 HOURS
Refills: 0 | Status: DISCONTINUED | OUTPATIENT
Start: 2020-09-16 | End: 2020-09-16

## 2020-09-15 RX ORDER — ACETAMINOPHEN 500 MG
1000 TABLET ORAL ONCE
Refills: 0 | Status: COMPLETED | OUTPATIENT
Start: 2020-09-16 | End: 2020-09-16

## 2020-09-15 RX ORDER — ONDANSETRON 8 MG/1
4 TABLET, FILM COATED ORAL
Refills: 0 | Status: COMPLETED | OUTPATIENT
Start: 2020-09-15 | End: 2020-09-16

## 2020-09-15 RX ORDER — FENTANYL CITRATE 50 UG/ML
50 INJECTION INTRAVENOUS
Refills: 0 | Status: DISCONTINUED | OUTPATIENT
Start: 2020-09-15 | End: 2020-09-16

## 2020-09-15 RX ADMIN — MORPHINE SULFATE 2 MILLIGRAM(S): 50 CAPSULE, EXTENDED RELEASE ORAL at 03:21

## 2020-09-15 RX ADMIN — ONDANSETRON 4 MILLIGRAM(S): 8 TABLET, FILM COATED ORAL at 11:54

## 2020-09-15 RX ADMIN — SODIUM CHLORIDE 3 MILLILITER(S): 9 INJECTION INTRAMUSCULAR; INTRAVENOUS; SUBCUTANEOUS at 13:05

## 2020-09-15 RX ADMIN — SODIUM CHLORIDE 125 MILLILITER(S): 9 INJECTION, SOLUTION INTRAVENOUS at 20:23

## 2020-09-15 RX ADMIN — MORPHINE SULFATE 2 MILLIGRAM(S): 50 CAPSULE, EXTENDED RELEASE ORAL at 04:21

## 2020-09-15 RX ADMIN — SODIUM CHLORIDE 3 MILLILITER(S): 9 INJECTION INTRAMUSCULAR; INTRAVENOUS; SUBCUTANEOUS at 05:09

## 2020-09-15 RX ADMIN — ONDANSETRON 4 MILLIGRAM(S): 8 TABLET, FILM COATED ORAL at 05:12

## 2020-09-15 RX ADMIN — ONDANSETRON 4 MILLIGRAM(S): 8 TABLET, FILM COATED ORAL at 21:59

## 2020-09-15 NOTE — PROGRESS NOTE ADULT - SUBJECTIVE AND OBJECTIVE BOX
INTERVAL HPI/OVERNIGHT EVENTS:    seen this morning with family bedside   brown stools during prep  no abd complaints this morning   npo for OR     MEDICATIONS  (STANDING):  FLUoxetine 40 milliGRAM(s) Oral daily  lactated ringers. 1000 milliLiter(s) (50 mL/Hr) IV Continuous <Continuous>  ondansetron Injectable 4 milliGRAM(s) IV Push every 6 hours  pantoprazole    Tablet 40 milliGRAM(s) Oral before breakfast  risperiDONE   Tablet 1 milliGRAM(s) Oral daily  sodium chloride 0.9% lock flush 3 milliLiter(s) IV Push every 8 hours    MEDICATIONS  (PRN):  acetaminophen   Tablet .. 650 milliGRAM(s) Oral every 6 hours PRN Mild Pain (1 - 3)  morphine  - Injectable 2 milliGRAM(s) IV Push every 4 hours PRN Moderate Pain (4 - 6)  petrolatum white Ointment 1 Application(s) Topical four times a day PRN irritation      Allergies    ibuprofen (Other)  NSAIDs (Unknown)    Intolerances        Review of Systems:    General:  No wt loss, fevers, chills, night sweats, fatigue   Eyes:  Good vision, no reported pain  ENT:  No sore throat, pain, runny nose, dysphagia  CV:  No pain, palpitations, hypo/hypertension  Resp:  No dyspnea, cough, tachypnea, wheezing  GI:  No pain, No nausea, No vomiting, No diarrhea, No constipation, No weight loss, No fever, No pruritis, No rectal bleeding, No melena, No dysphagia  :  No pain, bleeding, incontinence, nocturia  Muscle:  No pain, weakness  Neuro:  No weakness, tingling, memory problems  Psych:  No fatigue, insomnia, mood problems, depression  Endocrine:  No polyuria, polydypsia, cold/heat intolerance  Heme:  No petechiae, ecchymosis, easy bruisability  Skin:  No rash, tattoos, scars, edema      Vital Signs Last 24 Hrs  T(C): 36.7 (15 Sep 2020 11:45), Max: 37.3 (14 Sep 2020 19:26)  T(F): 98.1 (15 Sep 2020 11:45), Max: 99.1 (14 Sep 2020 19:26)  HR: 79 (15 Sep 2020 11:45) (77 - 100)  BP: 138/71 (15 Sep 2020 11:45) (125/73 - 138/71)  BP(mean): --  RR: 16 (15 Sep 2020 11:45) (16 - 18)  SpO2: 95% (15 Sep 2020 11:45) (93% - 96%)    PHYSICAL EXAM:    Constitutional: NAD  HEENT: EOMI, throat clear  Neck: No LAD, supple  Respiratory: CTA and P  Cardiovascular: S1 and S2, RRR, no M  Gastrointestinal: BS+, soft, NT/ND, neg HSM,  Extremities: No peripheral edema, neg clubbing, cyanosis  Vascular: 2+ peripheral pulses  Neurological: A/O x 3, no focal deficits  Psychiatric: Normal mood, normal affect  Skin: No rashes      LABS:                        10.6   10.37 )-----------( 363      ( 15 Sep 2020 06:30 )             35.1     09-15    138  |  103  |  6<L>  ----------------------------<  113<H>  4.0   |  24  |  0.49<L>    Ca    8.2<L>      15 Sep 2020 06:30  Phos  3.1     09-15  Mg     2.2     09-15      PT/INR - ( 14 Sep 2020 08:45 )   PT: 13.6 SEC;   INR: 1.19          PTT - ( 14 Sep 2020 08:45 )  PTT:29.7 SEC      RADIOLOGY & ADDITIONAL TESTS:

## 2020-09-15 NOTE — PROGRESS NOTE ADULT - SUBJECTIVE AND OBJECTIVE BOX
Gyn ONC Progress Note    Patient seen and examined at bedside on morning rounds. No acute events overnight, she tolerated the bowel prep without nausea or emesis. She denies pain. Ms. Catalan is ambulating, passing flatus and bowel movements, as well as voiding freely.  Pt denies fever, chills, chest pain, SOB, nausea, vomiting, lightheadedness, dizziness.      Objective:  T(F): 98.2 (09-15-20 @ 05:10), Max: 99.1 (09-14-20 @ 19:26)  HR: 77 (09-15-20 @ 05:10) (77 - 100)  BP: 127/72 (09-15-20 @ 05:10) (125/63 - 130/71)  RR: 18 (09-15-20 @ 05:10) (16 - 18)  SpO2: 93% (09-15-20 @ 05:10) (93% - 96%)      I&O's Summary  13 Sep 2020 07:01  -  14 Sep 2020 07:00  --------------------------------------------------------  IN: 0 mL / OUT: 1200 mL / NET: -1200 mL    14 Sep 2020 07:01  -  15 Sep 2020 06:47  --------------------------------------------------------  IN: 0 mL / OUT: 700 mL / NET: -700 mL      MEDICATIONS  (STANDING):  FLUoxetine 40 milliGRAM(s) Oral daily  lactated ringers. 1000 milliLiter(s) (50 mL/Hr) IV Continuous <Continuous>  ondansetron Injectable 4 milliGRAM(s) IV Push every 6 hours  pantoprazole    Tablet 40 milliGRAM(s) Oral before breakfast  risperiDONE   Tablet 1 milliGRAM(s) Oral daily  sodium chloride 0.9% lock flush 3 milliLiter(s) IV Push every 8 hours    MEDICATIONS  (PRN):  acetaminophen   Tablet .. 650 milliGRAM(s) Oral every 6 hours PRN Mild Pain (1 - 3)  morphine  - Injectable 2 milliGRAM(s) IV Push every 4 hours PRN Moderate Pain (4 - 6)  petrolatum white Ointment 1 Application(s) Topical four times a day PRN irritation      Physical Exam:  Constitutional: NAD, A+O x3  CV: RRR  Lungs: Clear to auscultation bilaterally  Abdomen: Softly distended. No guarding or rebound tenderness. +Bowel sounds.   Extremities: No lower extremity edema or calf tenderness bilaterally; Venodynes in place    LABS:  09-14    138    |  102    |  8      ----------------------------<  98     3.9     |  24     |  0.52     Ca    8.1<L>      14 Sep 2020 08:45  Phos  2.7       09-14  Mg     2.1       09-14    PT/INR - ( 14 Sep 2020 08:45 )   PT: 13.6 SEC;   INR: 1.19     PTT - ( 14 Sep 2020 08:45 )  PTT:29.7 SEC

## 2020-09-15 NOTE — BRIEF OPERATIVE NOTE - NSICDXBRIEFPROCEDURE_GEN_ALL_CORE_FT
PROCEDURES:  Open appendectomy 15-Sep-2020 18:39:14  Xena Apodaca  
PROCEDURES:  Total abdominal hysterectomy and bilateral salpingo-oophorectomy (ILEANA-BSO) 15-Sep-2020 20:07:05  Олег Dill  Open appendectomy 15-Sep-2020 18:39:14  Xena Apodaca

## 2020-09-15 NOTE — PROGRESS NOTE ADULT - SUBJECTIVE AND OBJECTIVE BOX
Ms. Catalan is comfortable in bed  Denies nausea or vomiting  Tolerated bowel prep yesterday    ICU Vital Signs Last 24 Hrs  T(C): 36.7 (15 Sep 2020 11:45), Max: 37.3 (14 Sep 2020 19:26)  T(F): 98.1 (15 Sep 2020 11:45), Max: 99.1 (14 Sep 2020 19:26)  HR: 79 (15 Sep 2020 11:45) (77 - 100)  BP: 138/71 (15 Sep 2020 11:45) (125/73 - 138/71)  BP(mean): --  ABP: --  ABP(mean): --  RR: 16 (15 Sep 2020 11:45) (16 - 18)  SpO2: 95% (15 Sep 2020 11:45) (93% - 96%)      On exam: awake, alert  Breathing comfortably  Abd is soft, not tender and not distended  No rebound, no guarding                          10.6   10.37 )-----------( 363      ( 15 Sep 2020 06:30 )             35.1     09-15    138  |  103  |  6<L>  ----------------------------<  113<H>  4.0   |  24  |  0.49<L>    Ca    8.2<L>      15 Sep 2020 06:30  Phos  3.1     09-15  Mg     2.2     09-15

## 2020-09-15 NOTE — PROGRESS NOTE ADULT - SUBJECTIVE AND OBJECTIVE BOX
Subjective: Patient seen and examined. No new events except as noted.   in PACU s/p TAHBSO, appendectomy   REVIEW OF SYSTEMS:    CONSTITUTIONAL: + weakness, fevers or chills  EYES/ENT: No visual changes;  No vertigo or throat pain   NECK: No pain or stiffness  RESPIRATORY: No cough, wheezing, hemoptysis; No shortness of breath  CARDIOVASCULAR: No chest pain or palpitations  GASTROINTESTINAL: No abdominal or epigastric pain. No nausea, vomiting, or hematemesis; No diarrhea or constipation. No melena or hematochezia.  GENITOURINARY: No dysuria, frequency or hematuria  NEUROLOGICAL: No numbness or weakness  SKIN: No itching, burning, rashes, or lesions   All other review of systems is negative unless indicated above.    MEDICATIONS:  MEDICATIONS  (STANDING):  FLUoxetine 40 milliGRAM(s) Oral daily  lactated ringers. 1000 milliLiter(s) (125 mL/Hr) IV Continuous <Continuous>  ondansetron Injectable 4 milliGRAM(s) IV Push every 6 hours  pantoprazole    Tablet 40 milliGRAM(s) Oral before breakfast  risperiDONE   Tablet 1 milliGRAM(s) Oral daily  sodium chloride 0.9% lock flush 3 milliLiter(s) IV Push every 8 hours      PHYSICAL EXAM:  T(C): 36.5 (09-15-20 @ 19:55), Max: 36.9 (09-15-20 @ 13:30)  HR: 86 (09-15-20 @ 20:30) (77 - 95)  BP: 122/57 (09-15-20 @ 20:30) (103/50 - 138/71)  RR: 19 (09-15-20 @ 20:30) (16 - 24)  SpO2: 96% (09-15-20 @ 20:30) (93% - 96%)  Wt(kg): --  I&O's Summary    14 Sep 2020 07:01  -  15 Sep 2020 07:00  --------------------------------------------------------  IN: 50 mL / OUT: 700 mL / NET: -650 mL    15 Sep 2020 07:01  -  15 Sep 2020 20:43  --------------------------------------------------------  IN: 400 mL / OUT: 280 mL / NET: 120 mL      Height (cm): 170.2 (09-15 @ 11:45)  Weight (kg): 89.3 (09-15 @ 11:45)  BMI (kg/m2): 30.8 (09-15 @ 11:45)  BSA (m2): 2.01 (09-15 @ 11:45)    Appearance: NAD	  HEENT:   Dry  oral mucosa, PERRL, EOMI	  Lymphatic: No lymphadenopathy , no edema  Cardiovascular: Normal S1 S2, No JVD, No murmurs , Peripheral pulses palpable 2+ bilaterally  Respiratory: Lungs clear to auscultation, normal effort 	  Gastrointestinal:  Soft, +tender, + BS	  Skin: No rashes, No ecchymoses, No cyanosis, warm to touch  Musculoskeletal: Normal range of motion, normal strength  Psychiatry:  Mood & affect appropriate  Ext: No edema      LABS:    CARDIAC MARKERS:                                10.6   10.37 )-----------( 363      ( 15 Sep 2020 06:30 )             35.1     09-15    138  |  103  |  6<L>  ----------------------------<  113<H>  4.0   |  24  |  0.49<L>    Ca    8.2<L>      15 Sep 2020 06:30  Phos  3.1     09-15  Mg     2.2     09-15      proBNP:   Lipid Profile:   HgA1c:   TSH:             TELEMETRY: 	    ECG:  	  RADIOLOGY:   DIAGNOSTIC TESTING:  [ ] Echocardiogram:  [ ]  Catheterization:  [ ] Stress Test:    OTHER:

## 2020-09-15 NOTE — PROGRESS NOTE ADULT - ASSESSMENT
44y/o F transferred from [facility] after presenting with a partial SBO in the context of a known complex multiseptated pelvic mass with associated peritoneal carcinomatosis, suspicious for possible ovarian malignancy. ( 211,  434, CEA <0.6). CT chest without evidence of metastatic disease. Throughout the course of her admission, patients abdominal tenderness has improved. She is s/p Endoscopy and Colonoscopy on 9/11 significant for a 3cm hiatal hernia, LA grade D esophagitis, gastritis (bx'd), few gastric polyps (bx'd), and a 3mm polyp in the descending colon (resected). Patient is now s/p bowel preparation and NPO in advance of scheduled robotic-assisted TLH, BSO, tumor debulking and possible exploratory-laparotomy with bowel resection this afternoon. She remains with stable vitals and reassuring overall clinical status.

## 2020-09-15 NOTE — BRIEF OPERATIVE NOTE - OPERATION/FINDINGS
Patient undergoing Ex-lap with GYN for pelvic mass  Incidentally noted to have abnormal thickened and torturous appendix  Appendix taken with VARUN  Staple line oversewn
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 Absent left ureter. Right ureter with possible bifurcation vs. ?double right sided ureter. Dark brown ascites in abdomen and pelvis, endometriosis implants on pelvic side walls, bowel and omentum. 9cm ruptured left ovarian mass consistent with endometrioma. 5cm similar right ovarian mass (Frozen=endometriosis). Thickened tortuous appendix, resected by Dr. JO Turner.

## 2020-09-15 NOTE — BRIEF OPERATIVE NOTE - NSICDXBRIEFPOSTOP_GEN_ALL_CORE_FT
POST-OP DIAGNOSIS:  Other and unspecified diseases of appendix 15-Sep-2020 18:40:08  Xena Apodaca  
POST-OP DIAGNOSIS:  Endometriosis 15-Sep-2020 20:07:26  Олег Dill

## 2020-09-15 NOTE — PROGRESS NOTE ADULT - PROBLEM SELECTOR PLAN 1
Neuro: Pain well controlled. Continue Tylenol with Morphine PRN.   Psych: PMHx of paranoid schizophrenia, anxiety and depression: Continue home Risperdal and Prozac  - Appreciate psych recs (9/12: c/w current meds, no further role for medication optimization)  CV: Hemodynamically stable, AM CBC pending. T&S active from 9/14.   - PMHx of pulmonic stenosis: TTE (9/10) showing normal pulmonic valve and mild to moderate TR; Cardiology stating that patient is an acceptable surgical candidate from cardiac perspective. Appreciate Cards recs.  Pulm: Saturating well on RA. Increase incentive spirometry.  FEN: LR@50cc/hr. Will f/u AM BMP/Mg/Phos.   GI: NPO in anticipation of OR today, s/p bowel prep yesterday 9/14. Continue Protonix 40mg PO qD, with standing Zofran q6 for nausea;  - s/p Endoscopy/Colonoscopy (9/11): No infiltration or masses seen on colonoscopy; will f/u bx of esophagitis, gastric polyps, and 3mm colonic polyp.   : Voiding freely, no active issues.   ID: No active issues. COVID negative (9/13)  Heme: Continue Venodynes for DVT ppx, Lovenox held in anticipation of OR. Increase ambulation.     Dispo: Continue inpatient management, patient scheduled for robotic assisted TLH, BSO, tumor debulking with possible ex-lap and bowel resection at 13:00 today.     Patient seen and evaluated with GYN Oncology team.   Mauricio, PGY-2

## 2020-09-15 NOTE — PRE-OP CHECKLIST - SELECT TESTS ORDERED
BMP/CBC CBC/PT/PTT/INR/BMP HCG/covid negative 9/13/BMP/CBC/PT/PTT/INR HCG/BMP/PT/PTT/Type and Screen/covid negative 9/13/CBC/INR/Type and Cross

## 2020-09-15 NOTE — BRIEF OPERATIVE NOTE - NSICDXBRIEFPREOP_GEN_ALL_CORE_FT
PRE-OP DIAGNOSIS:  Pelvic mass 15-Sep-2020 18:39:51  Xena Apodaca  
PRE-OP DIAGNOSIS:  Pelvic mass 15-Sep-2020 18:39:51  Xena Apodaca

## 2020-09-15 NOTE — PROGRESS NOTE ADULT - PROBLEM SELECTOR PLAN 4
Advanced care planning was discussed with patient and family.  Advanced care planning forms were reviewed and discussed.  Risks, benefits and alternatives of gastroenterologic procedures were discussed in detail and all questions were answered.

## 2020-09-15 NOTE — PROGRESS NOTE ADULT - PROBLEM SELECTOR PROBLEM 4
ACP (advance care planning)

## 2020-09-15 NOTE — CHART NOTE - NSCHARTNOTEFT_GEN_A_CORE
POST-OPERATIVE NOTE    Subjective:  Patient is s/p open appendectomy and ILEANA-SBO. Recovering appropriately.     Vital Signs Last 24 Hrs  T(C): 36.5 (15 Sep 2020 19:55), Max: 36.9 (15 Sep 2020 13:30)  T(F): 97.7 (15 Sep 2020 19:55), Max: 98.4 (15 Sep 2020 13:30)  HR: 89 (15 Sep 2020 21:45) (77 - 95)  BP: 130/66 (15 Sep 2020 21:30) (103/50 - 138/71)  BP(mean): 79 (15 Sep 2020 21:30) (57 - 79)  RR: 28 (15 Sep 2020 21:45) (16 - 44)  SpO2: 94% (15 Sep 2020 21:45) (93% - 99%)  I&O's Detail    14 Sep 2020 07:01  -  15 Sep 2020 07:00  --------------------------------------------------------  IN:    Lactated Ringers: 50 mL  Total IN: 50 mL    OUT:    Voided (mL): 700 mL  Total OUT: 700 mL    Total NET: -650 mL      15 Sep 2020 07:01  -  15 Sep 2020 23:31  --------------------------------------------------------  IN:    Lactated Ringers: 525 mL  Total IN: 525 mL    OUT:    Indwelling Catheter - Urethral (mL): 180 mL    Oral Fluid: 0 mL    Voided (mL): 200 mL  Total OUT: 380 mL    Total NET: 145 mL          PAST MEDICAL & SURGICAL HISTORY:  H/O pulmonary valve stenosis    Heart murmur    Anxiety    Paranoid schizophrenia    No significant past surgical history          Physical Exam:  General: NAD, resting comfortably in bed  Pulmonary: Nonlabored breathing, no respiratory distress  Cardiovascular: NSR  Abdominal: soft, NT/ND  Extremities: WWP      LABS:                        10.6   10.37 )-----------( 363      ( 15 Sep 2020 06:30 )             35.1     09-15    138  |  103  |  6<L>  ----------------------------<  113<H>  4.0   |  24  |  0.49<L>    Ca    8.2<L>      15 Sep 2020 06:30  Phos  3.1     09-15  Mg     2.2     09-15      PT/INR - ( 14 Sep 2020 08:45 )   PT: 13.6 SEC;   INR: 1.19          PTT - ( 14 Sep 2020 08:45 )  PTT:29.7 SEC  CAPILLARY BLOOD GLUCOSE          Radiology and Additional Studies:    Assessment:  The patient is a 45y Female who is now several hours post-op from a ILEANA-SBO with gyn onc and open appendectomy with general surgery.     Plan:  - Pain control as needed  - OOB and ambulating as tolerated  - F/u AM labs  - Care per primary gyn onc team appreciated    Surgery Team B  [id number] POST-OPERATIVE NOTE    Subjective:  Patient is s/p open appendectomy and ILEANA-SBO. Patient reports that pain is well controlled. Has roberts in place with clear yellow urine. No flatus or BM since surgery. Mild nausea and no vomiting. NO chest pain, headache, or difficulty breathing. Recovering appropriately.     Vital Signs Last 24 Hrs  T(C): 36.5 (15 Sep 2020 19:55), Max: 36.9 (15 Sep 2020 13:30)  T(F): 97.7 (15 Sep 2020 19:55), Max: 98.4 (15 Sep 2020 13:30)  HR: 89 (15 Sep 2020 21:45) (77 - 95)  BP: 130/66 (15 Sep 2020 21:30) (103/50 - 138/71)  BP(mean): 79 (15 Sep 2020 21:30) (57 - 79)  RR: 28 (15 Sep 2020 21:45) (16 - 44)  SpO2: 94% (15 Sep 2020 21:45) (93% - 99%)  I&O's Detail    14 Sep 2020 07:01  -  15 Sep 2020 07:00  --------------------------------------------------------  IN:    Lactated Ringers: 50 mL  Total IN: 50 mL    OUT:    Voided (mL): 700 mL  Total OUT: 700 mL    Total NET: -650 mL      15 Sep 2020 07:01  -  15 Sep 2020 23:31  --------------------------------------------------------  IN:    Lactated Ringers: 525 mL  Total IN: 525 mL    OUT:    Indwelling Catheter - Urethral (mL): 180 mL    Oral Fluid: 0 mL    Voided (mL): 200 mL  Total OUT: 380 mL    Total NET: 145 mL          PAST MEDICAL & SURGICAL HISTORY:  H/O pulmonary valve stenosis    Heart murmur    Anxiety    Paranoid schizophrenia    No significant past surgical history          Physical Exam:  General: NAD, resting comfortably in bed  Pulmonary: Nonlabored breathing, no respiratory distress  Cardiovascular: NSR  Abdominal: soft, NT/ND, Midline incision c.d.i  Extremities: WWP      LABS:                        10.6   10.37 )-----------( 363      ( 15 Sep 2020 06:30 )             35.1     09-15    138  |  103  |  6<L>  ----------------------------<  113<H>  4.0   |  24  |  0.49<L>    Ca    8.2<L>      15 Sep 2020 06:30  Phos  3.1     09-15  Mg     2.2     09-15      PT/INR - ( 14 Sep 2020 08:45 )   PT: 13.6 SEC;   INR: 1.19          PTT - ( 14 Sep 2020 08:45 )  PTT:29.7 SEC  CAPILLARY BLOOD GLUCOSE          Radiology and Additional Studies:    Assessment:  The patient is a 45y Female who is now several hours post-op from a [facility]-SBO with gyn onc and open appendectomy with general surgery.     Plan:  - Pain control as needed  - OOB and ambulating as tolerated  - F/u AM labs  - Care per primary gyn onc team appreciated    Surgery Team B  [id number]

## 2020-09-16 LAB
ANION GAP SERPL CALC-SCNC: 10 MMO/L — SIGNIFICANT CHANGE UP (ref 7–14)
ANISOCYTOSIS BLD QL: SLIGHT — SIGNIFICANT CHANGE UP
BASOPHILS # BLD AUTO: 0.03 K/UL — SIGNIFICANT CHANGE UP (ref 0–0.2)
BASOPHILS NFR BLD AUTO: 0.1 % — SIGNIFICANT CHANGE UP (ref 0–2)
BASOPHILS NFR SPEC: 0 % — SIGNIFICANT CHANGE UP (ref 0–2)
BLASTS # FLD: 0 % — SIGNIFICANT CHANGE UP (ref 0–0)
BUN SERPL-MCNC: 7 MG/DL — SIGNIFICANT CHANGE UP (ref 7–23)
CALCIUM SERPL-MCNC: 7.7 MG/DL — LOW (ref 8.4–10.5)
CHLORIDE SERPL-SCNC: 105 MMOL/L — SIGNIFICANT CHANGE UP (ref 98–107)
CO2 SERPL-SCNC: 21 MMOL/L — LOW (ref 22–31)
CREAT SERPL-MCNC: 0.43 MG/DL — LOW (ref 0.5–1.3)
ELLIPTOCYTES BLD QL SMEAR: SLIGHT — SIGNIFICANT CHANGE UP
EOSINOPHIL # BLD AUTO: 0.01 K/UL — SIGNIFICANT CHANGE UP (ref 0–0.5)
EOSINOPHIL NFR BLD AUTO: 0 % — SIGNIFICANT CHANGE UP (ref 0–6)
EOSINOPHIL NFR FLD: 0 % — SIGNIFICANT CHANGE UP (ref 0–6)
GLUCOSE SERPL-MCNC: 129 MG/DL — HIGH (ref 70–99)
HCT VFR BLD CALC: 28.6 % — LOW (ref 34.5–45)
HGB BLD-MCNC: 8.8 G/DL — LOW (ref 11.5–15.5)
IMM GRANULOCYTES NFR BLD AUTO: 1.4 % — SIGNIFICANT CHANGE UP (ref 0–1.5)
LYMPHOCYTES # BLD AUTO: 0.76 K/UL — LOW (ref 1–3.3)
LYMPHOCYTES # BLD AUTO: 3.2 % — LOW (ref 13–44)
LYMPHOCYTES NFR SPEC AUTO: 1.7 % — LOW (ref 13–44)
MAGNESIUM SERPL-MCNC: 1.8 MG/DL — SIGNIFICANT CHANGE UP (ref 1.6–2.6)
MCHC RBC-ENTMCNC: 27.8 PG — SIGNIFICANT CHANGE UP (ref 27–34)
MCHC RBC-ENTMCNC: 30.8 % — LOW (ref 32–36)
MCV RBC AUTO: 90.5 FL — SIGNIFICANT CHANGE UP (ref 80–100)
METAMYELOCYTES # FLD: 0 % — SIGNIFICANT CHANGE UP (ref 0–1)
MONOCYTES # BLD AUTO: 1.08 K/UL — HIGH (ref 0–0.9)
MONOCYTES NFR BLD AUTO: 4.5 % — SIGNIFICANT CHANGE UP (ref 2–14)
MONOCYTES NFR BLD: 1.7 % — LOW (ref 2–9)
MYELOCYTES NFR BLD: 0 % — SIGNIFICANT CHANGE UP (ref 0–0)
NEUTROPHIL AB SER-ACNC: 95.7 % — HIGH (ref 43–77)
NEUTROPHILS # BLD AUTO: 21.81 K/UL — HIGH (ref 1.8–7.4)
NEUTROPHILS NFR BLD AUTO: 90.8 % — HIGH (ref 43–77)
NEUTS BAND # BLD: 0 % — SIGNIFICANT CHANGE UP (ref 0–6)
NRBC # FLD: 0 K/UL — SIGNIFICANT CHANGE UP (ref 0–0)
OTHER - HEMATOLOGY %: 0 — SIGNIFICANT CHANGE UP
OVALOCYTES BLD QL SMEAR: SIGNIFICANT CHANGE UP
PHOSPHATE SERPL-MCNC: 2.9 MG/DL — SIGNIFICANT CHANGE UP (ref 2.5–4.5)
PLATELET # BLD AUTO: 439 K/UL — HIGH (ref 150–400)
PLATELET COUNT - ESTIMATE: NORMAL — SIGNIFICANT CHANGE UP
PMV BLD: 10.2 FL — SIGNIFICANT CHANGE UP (ref 7–13)
POIKILOCYTOSIS BLD QL AUTO: SIGNIFICANT CHANGE UP
POLYCHROMASIA BLD QL SMEAR: SIGNIFICANT CHANGE UP
POTASSIUM SERPL-MCNC: 4.3 MMOL/L — SIGNIFICANT CHANGE UP (ref 3.5–5.3)
POTASSIUM SERPL-SCNC: 4.3 MMOL/L — SIGNIFICANT CHANGE UP (ref 3.5–5.3)
PROMYELOCYTES # FLD: 0 % — SIGNIFICANT CHANGE UP (ref 0–0)
RBC # BLD: 3.16 M/UL — LOW (ref 3.8–5.2)
RBC # FLD: 13.5 % — SIGNIFICANT CHANGE UP (ref 10.3–14.5)
SODIUM SERPL-SCNC: 136 MMOL/L — SIGNIFICANT CHANGE UP (ref 135–145)
VARIANT LYMPHS # BLD: 0.9 % — SIGNIFICANT CHANGE UP
WBC # BLD: 24.03 K/UL — HIGH (ref 3.8–10.5)
WBC # FLD AUTO: 24.03 K/UL — HIGH (ref 3.8–10.5)

## 2020-09-16 PROCEDURE — 99233 SBSQ HOSP IP/OBS HIGH 50: CPT

## 2020-09-16 RX ORDER — HYDROMORPHONE HYDROCHLORIDE 2 MG/ML
0.5 INJECTION INTRAMUSCULAR; INTRAVENOUS; SUBCUTANEOUS
Refills: 0 | Status: DISCONTINUED | OUTPATIENT
Start: 2020-09-16 | End: 2020-09-17

## 2020-09-16 RX ORDER — SODIUM CHLORIDE 9 MG/ML
1000 INJECTION INTRAMUSCULAR; INTRAVENOUS; SUBCUTANEOUS
Refills: 0 | Status: DISCONTINUED | OUTPATIENT
Start: 2020-09-16 | End: 2020-09-16

## 2020-09-16 RX ORDER — HYDROMORPHONE HYDROCHLORIDE 2 MG/ML
0.5 INJECTION INTRAMUSCULAR; INTRAVENOUS; SUBCUTANEOUS ONCE
Refills: 0 | Status: DISCONTINUED | OUTPATIENT
Start: 2020-09-16 | End: 2020-09-16

## 2020-09-16 RX ORDER — ONDANSETRON 8 MG/1
4 TABLET, FILM COATED ORAL EVERY 6 HOURS
Refills: 0 | Status: DISCONTINUED | OUTPATIENT
Start: 2020-09-16 | End: 2020-09-19

## 2020-09-16 RX ORDER — SODIUM CHLORIDE 9 MG/ML
1000 INJECTION, SOLUTION INTRAVENOUS
Refills: 0 | Status: DISCONTINUED | OUTPATIENT
Start: 2020-09-16 | End: 2020-09-18

## 2020-09-16 RX ORDER — HYDROMORPHONE HYDROCHLORIDE 2 MG/ML
30 INJECTION INTRAMUSCULAR; INTRAVENOUS; SUBCUTANEOUS
Refills: 0 | Status: DISCONTINUED | OUTPATIENT
Start: 2020-09-16 | End: 2020-09-17

## 2020-09-16 RX ORDER — ENOXAPARIN SODIUM 100 MG/ML
40 INJECTION SUBCUTANEOUS DAILY
Refills: 0 | Status: DISCONTINUED | OUTPATIENT
Start: 2020-09-16 | End: 2020-09-19

## 2020-09-16 RX ORDER — DIPHENHYDRAMINE HCL 50 MG
25 CAPSULE ORAL ONCE
Refills: 0 | Status: COMPLETED | OUTPATIENT
Start: 2020-09-16 | End: 2020-09-16

## 2020-09-16 RX ORDER — NALOXONE HYDROCHLORIDE 4 MG/.1ML
0.1 SPRAY NASAL
Refills: 0 | Status: DISCONTINUED | OUTPATIENT
Start: 2020-09-16 | End: 2020-09-17

## 2020-09-16 RX ADMIN — Medication 200 MILLIGRAM(S): at 14:20

## 2020-09-16 RX ADMIN — HYDROMORPHONE HYDROCHLORIDE 0.5 MILLIGRAM(S): 2 INJECTION INTRAMUSCULAR; INTRAVENOUS; SUBCUTANEOUS at 07:08

## 2020-09-16 RX ADMIN — Medication 1000 MILLIGRAM(S): at 21:35

## 2020-09-16 RX ADMIN — Medication 400 MILLIGRAM(S): at 00:07

## 2020-09-16 RX ADMIN — ENOXAPARIN SODIUM 40 MILLIGRAM(S): 100 INJECTION SUBCUTANEOUS at 17:34

## 2020-09-16 RX ADMIN — Medication 1000 MILLIGRAM(S): at 14:17

## 2020-09-16 RX ADMIN — HEPARIN SODIUM 5000 UNIT(S): 5000 INJECTION INTRAVENOUS; SUBCUTANEOUS at 08:20

## 2020-09-16 RX ADMIN — Medication 25 MILLIGRAM(S): at 01:28

## 2020-09-16 RX ADMIN — ONDANSETRON 4 MILLIGRAM(S): 8 TABLET, FILM COATED ORAL at 04:58

## 2020-09-16 RX ADMIN — Medication 40 MILLIGRAM(S): at 00:06

## 2020-09-16 RX ADMIN — RISPERIDONE 1 MILLIGRAM(S): 4 TABLET ORAL at 11:45

## 2020-09-16 RX ADMIN — SODIUM CHLORIDE 3 MILLILITER(S): 9 INJECTION INTRAMUSCULAR; INTRAVENOUS; SUBCUTANEOUS at 14:26

## 2020-09-16 RX ADMIN — Medication 1000 MILLIGRAM(S): at 01:04

## 2020-09-16 RX ADMIN — Medication 400 MILLIGRAM(S): at 08:16

## 2020-09-16 RX ADMIN — SODIUM CHLORIDE 3 MILLILITER(S): 9 INJECTION INTRAMUSCULAR; INTRAVENOUS; SUBCUTANEOUS at 04:56

## 2020-09-16 RX ADMIN — SODIUM CHLORIDE 125 MILLILITER(S): 9 INJECTION, SOLUTION INTRAVENOUS at 01:08

## 2020-09-16 RX ADMIN — HYDROMORPHONE HYDROCHLORIDE 30 MILLILITER(S): 2 INJECTION INTRAMUSCULAR; INTRAVENOUS; SUBCUTANEOUS at 19:12

## 2020-09-16 RX ADMIN — SODIUM CHLORIDE 75 MILLILITER(S): 9 INJECTION, SOLUTION INTRAVENOUS at 16:30

## 2020-09-16 RX ADMIN — SODIUM CHLORIDE 3 MILLILITER(S): 9 INJECTION INTRAMUSCULAR; INTRAVENOUS; SUBCUTANEOUS at 21:29

## 2020-09-16 RX ADMIN — Medication 400 MILLIGRAM(S): at 20:02

## 2020-09-16 RX ADMIN — PANTOPRAZOLE SODIUM 40 MILLIGRAM(S): 20 TABLET, DELAYED RELEASE ORAL at 06:07

## 2020-09-16 RX ADMIN — Medication 400 MILLIGRAM(S): at 13:47

## 2020-09-16 RX ADMIN — RISPERIDONE 1 MILLIGRAM(S): 4 TABLET ORAL at 00:06

## 2020-09-16 RX ADMIN — HYDROMORPHONE HYDROCHLORIDE 30 MILLILITER(S): 2 INJECTION INTRAMUSCULAR; INTRAVENOUS; SUBCUTANEOUS at 16:31

## 2020-09-16 RX ADMIN — HYDROMORPHONE HYDROCHLORIDE 0.5 MILLIGRAM(S): 2 INJECTION INTRAMUSCULAR; INTRAVENOUS; SUBCUTANEOUS at 07:20

## 2020-09-16 RX ADMIN — HEPARIN SODIUM 5000 UNIT(S): 5000 INJECTION INTRAVENOUS; SUBCUTANEOUS at 00:07

## 2020-09-16 RX ADMIN — ONDANSETRON 4 MILLIGRAM(S): 8 TABLET, FILM COATED ORAL at 17:34

## 2020-09-16 RX ADMIN — ONDANSETRON 4 MILLIGRAM(S): 8 TABLET, FILM COATED ORAL at 11:45

## 2020-09-16 RX ADMIN — ONDANSETRON 4 MILLIGRAM(S): 8 TABLET, FILM COATED ORAL at 00:06

## 2020-09-16 RX ADMIN — Medication 40 MILLIGRAM(S): at 11:45

## 2020-09-16 RX ADMIN — Medication 1000 MILLIGRAM(S): at 08:45

## 2020-09-16 RX ADMIN — ONDANSETRON 4 MILLIGRAM(S): 8 TABLET, FILM COATED ORAL at 23:29

## 2020-09-16 NOTE — PROGRESS NOTE ADULT - SUBJECTIVE AND OBJECTIVE BOX
GENERAL SURGERY PROGRESS NOTE    S: Patient c/o nausea, post surgical pain poorly controlled, not yet ambulating, tolerating CLD, not yet passing flatus or BM.    O: Vital Signs  T(C): 36.6 (09-16 @ 08:06), Max: 36.9 (09-15 @ 13:30)  HR: 118 (09-16 @ 06:04) (77 - 118)  BP: 138/77 (09-16 @ 08:06) (103/50 - 148/71)  RR: 16 (09-16 @ 08:06) (16 - 44)  SpO2: 94% (09-16 @ 06:04) (94% - 99%)  09-15-20 @ 07:01  -  09-16-20 @ 07:00  --------------------------------------------------------  IN: 875 mL / OUT: 1130 mL / NET: -255 mL    09-16-20 @ 07:01  -  09-16-20 @ 08:14  --------------------------------------------------------  IN: 0 mL / OUT: 250 mL / NET: -250 mL      General: alert and oriented, NAD  Resp: airway patent, respirations unlabored  CVS: regular rate and rhythm  Abdomen: nondistended, incisions c/d/i, appropriate tatum-incisional tenderness  Extremities: no edema  Skin: warm, dry, appropriate color                          8.8    24.03 )-----------( 439      ( 16 Sep 2020 05:34 )             28.6   09-16    136  |  105  |  7   ----------------------------<  129<H>  4.3   |  21<L>  |  0.43<L>    Ca    7.7<L>      16 Sep 2020 05:34  Phos  2.9     09-16  Mg     1.8     09-16

## 2020-09-16 NOTE — PROVIDER CONTACT NOTE (OTHER) - BACKGROUND
s/p hysterectomy and b/l salpingo-oophorectomy. Hx of paranoid schizophrenia.
Patient is admitted for abdominal pain, nausea/vomiting.
s/p hysterectomy and b/l oophorectomy. hx of paranoid schizophrenia.

## 2020-09-16 NOTE — PROGRESS NOTE ADULT - SUBJECTIVE AND OBJECTIVE BOX
Gyn ONC Progress Note HD#9 POD#1     Patient seen and examined at bedside, now POD#1 s/p ILEANA, BSO and appendectomy (frozen=endometrioma). She reports abdominal pain throughout the night, and has not yet been out of bed to ambulate postoperatively. She tolerated sips of water and apple juice without emesis, however endorses persistent nausea.  She has not yet passed flatus. Pt denies fever, chills, chest pain, shortness of breath, lightheadedness or dizziness.      Objective:  T(F): 97.7 (09-16-20 @ 06:04), Max: 98.4 (09-15-20 @ 13:30)  HR: 118 (09-16-20 @ 06:04) (77 - 118)  BP: 144/75 (09-16-20 @ 06:04) (103/50 - 148/71)  RR: 18 (09-16-20 @ 06:04) (16 - 44)  SpO2: 94% (09-16-20 @ 06:04) (94% - 99%)      I&O's Summary  15 Sep 2020 07:01  -  16 Sep 2020 07:00  --------------------------------------------------------  IN: 875 mL / OUT: 1130 mL / NET: -255 mL      MEDICATIONS  (STANDING):  acetaminophen  IVPB .. 1000 milliGRAM(s) IV Intermittent once  acetaminophen  IVPB .. 1000 milliGRAM(s) IV Intermittent once  acetaminophen  IVPB .. 1000 milliGRAM(s) IV Intermittent once  FLUoxetine 40 milliGRAM(s) Oral daily  heparin   Injectable 5000 Unit(s) SubCutaneous every 8 hours  lactated ringers. 1000 milliLiter(s) (125 mL/Hr) IV Continuous <Continuous>  ondansetron Injectable 4 milliGRAM(s) IV Push every 6 hours  ondansetron Injectable 4 milliGRAM(s) IV Push every 6 hours  pantoprazole    Tablet 40 milliGRAM(s) Oral before breakfast  risperiDONE   Tablet 1 milliGRAM(s) Oral daily  sodium chloride 0.9% lock flush 3 milliLiter(s) IV Push every 8 hours    MEDICATIONS  (PRN):  fentaNYL    Injectable 50 MICROGram(s) IV Push every 15 minutes PRN Severe Pain (7 - 10)  petrolatum white Ointment 1 Application(s) Topical four times a day PRN irritation      Physical Exam:  Constitutional: NAD, A+O x3  CV: Tachycardic, regular rhythm   Lungs: Clear to auscultation bilaterally  Abdomen: Softly distended. Diffusely tender to palpation. No rebound tenderness.   Incision: Midline vertical incision, c/d/i. No erythema or warmth.   Extremities: No lower extremity edema or calf tenderness bilaterally; Venodynes in place      LABS:  09-16    136    |  105    |  7      ----------------------------<  129<H>  4.3     |  21<L>  |  0.43<L>  09-15    138    |  103    |  6<L>   ----------------------------<  113<H>  4.0     |  24     |  0.49<L>    Ca    7.7<L>      16 Sep 2020 05:34  Ca    8.2<L>      15 Sep 2020 06:30  Phos  2.9       09-16  Phos  3.1       09-15  Mg     1.8       09-16  Mg     2.2       09-15    PT/INR - ( 14 Sep 2020 08:45 )   PT: 13.6 SEC;   INR: 1.19     PTT - ( 14 Sep 2020 08:45 )  PTT:29.7 SEC

## 2020-09-16 NOTE — PROGRESS NOTE ADULT - ASSESSMENT
44y/o F now POD #1 s/p underwent exploratory laparotomy, total abdominal hysterectomy, bilateral salpingo-oophorectomy and appendectomy on 9/15/2020, where she was found to have extensive endometriosis (frozen pathology=endometrioma). Patient was initially transferred from [facility] after presenting with abdominal pain and concern for partial SBO in the context of a known complex multiseptated pelvic mass with imaging findings concerning for possible peritoneal carcinomatosis, initially suspicious for possible ovarian malignancy. ( 211,  434, CEA <0.6, CT Chest within normal limits). She is also s/p Endoscopy and Colonoscopy on 9/11 significant for a 3cm hiatal hernia, LA grade D esophagitis, gastritis (bx'd), few gastric polyps (bx'd), and a 3mm polyp in the descending colon (resected). Patient with tachycardia noted this morning (HR most recently 118), however otherwise stable vital signs, likely related to poorly controlled postoperative pain. Overall reassuring clinical status.    46y/o F now POD #1 s/p underwent exploratory laparotomy, total abdominal hysterectomy, bilateral salpingo-oophorectomy and appendectomy on 9/15/2020, where she was found to have extensive endometriosis (frozen pathology=endometrioma). Patient with tachycardia noted this morning (HR most recently 118), however otherwise stable vital signs, likely related to poorly controlled postoperative pain. Overall reassuring clinical status.

## 2020-09-16 NOTE — PROVIDER CONTACT NOTE (OTHER) - ACTION/TREATMENT ORDERED:
Team aware. Constant observation in place. Patient moved from bed to chair, reporting some relief of discomfort. Will continue to monitor.

## 2020-09-16 NOTE — PROGRESS NOTE ADULT - SUBJECTIVE AND OBJECTIVE BOX
INTERVAL HPI/OVERNIGHT EVENTS:    s/p OR yesterday   c/o abd pain and nausea; no vomiting   no gi function yet    MEDICATIONS  (STANDING):  acetaminophen  IVPB .. 1000 milliGRAM(s) IV Intermittent once  acetaminophen  IVPB .. 1000 milliGRAM(s) IV Intermittent once  enoxaparin Injectable 40 milliGRAM(s) SubCutaneous daily  FLUoxetine 40 milliGRAM(s) Oral daily  lactated ringers. 1000 milliLiter(s) (125 mL/Hr) IV Continuous <Continuous>  ondansetron Injectable 4 milliGRAM(s) IV Push every 6 hours  ondansetron Injectable 4 milliGRAM(s) IV Push every 6 hours  pantoprazole    Tablet 40 milliGRAM(s) Oral before breakfast  risperiDONE   Tablet 1 milliGRAM(s) Oral daily  sodium chloride 0.9% lock flush 3 milliLiter(s) IV Push every 8 hours    MEDICATIONS  (PRN):  petrolatum white Ointment 1 Application(s) Topical four times a day PRN irritation      Allergies    ibuprofen (Other)  NSAIDs (Unknown)    Intolerances        Review of Systems:    General:  No wt loss, fevers, chills, night sweats, fatigue   Eyes:  Good vision, no reported pain  ENT:  No sore throat, pain, runny nose, dysphagia  CV:  No pain, palpitations, hypo/hypertension  Resp:  No dyspnea, cough, tachypnea, wheezing  GI:  +pain, +nausea, No vomiting, No diarrhea, No constipation, No weight loss, No fever, No pruritis, No rectal bleeding, No melena, No dysphagia  :  No pain, bleeding, incontinence, nocturia  Muscle:  No pain, weakness  Neuro:  No weakness, tingling, memory problems  Psych:  No fatigue, insomnia, mood problems, depression  Endocrine:  No polyuria, polydypsia, cold/heat intolerance  Heme:  No petechiae, ecchymosis, easy bruisability  Skin:  No rash, tattoos, scars, edema      Vital Signs Last 24 Hrs  T(C): 36.6 (16 Sep 2020 08:06), Max: 36.9 (15 Sep 2020 13:30)  T(F): 97.9 (16 Sep 2020 08:06), Max: 98.4 (15 Sep 2020 13:30)  HR: 118 (16 Sep 2020 06:04) (79 - 118)  BP: 138/77 (16 Sep 2020 08:06) (103/50 - 148/71)  BP(mean): 79 (15 Sep 2020 21:30) (57 - 79)  RR: 16 (16 Sep 2020 08:06) (16 - 44)  SpO2: 94% (16 Sep 2020 06:04) (94% - 99%)    PHYSICAL EXAM:    Constitutional: NAD  HEENT: EOMI, throat clear  Neck: No LAD, supple  Respiratory: CTA and P  Cardiovascular: S1 and S2, RRR, no M  Gastrointestinal:  soft, +ttp diffusely, +incision c/d/i, neg HSM,  Extremities: No peripheral edema, neg clubbing, cyanosis  Vascular: 2+ peripheral pulses  Neurological: A/O x 3, no focal deficits  Psychiatric: Normal mood, normal affect  Skin: No rashes      LABS:                        8.8    24.03 )-----------( 439      ( 16 Sep 2020 05:34 )             28.6     09-16    136  |  105  |  7   ----------------------------<  129<H>  4.3   |  21<L>  |  0.43<L>    Ca    7.7<L>      16 Sep 2020 05:34  Phos  2.9     09-16  Mg     1.8     09-16            RADIOLOGY & ADDITIONAL TESTS:

## 2020-09-16 NOTE — PROGRESS NOTE ADULT - SUBJECTIVE AND OBJECTIVE BOX
Chief Complaint:  unrelieved surgical pain    HPI:  REBECCA CALL  45y  Female [id number]    HPI: 46 y/o G0, LMP 96/20, admitted with abdominal pain. Patient s/p ED visit 9/4 with same complaint. Found to have large complex multiseptated pelvic mass with possible peritoneal carcinomatosis at that time concerning for ovarian malignancy. GYN consulted, recommended tumor markers and outpatient follow up with GYN ONC. Patient admitted today for persistant abdominal pain. Patient reports that pain since ED visit has continued and has not responded to Tylenol and Oxy. Patient not taking NSAIDs as she is allergic. Patient also endorses nausea with infrequent vomiting. Unable to tolerating full PO secondary to pain and decreased appetite. Also reports not passing gas or having a bowel movement since three days ago. Normally patient has a bowel movement daily. Reports small caliber stools. Denies bloody stool, tarry stools. Denies fevers, chills, urinary symptoms. Endorses some shortness of breath when laying flat. Denies mood symptoms currently.     Interval Events  Pt was transferred to [facility] for pre-op workup as well as possible OR procedure. At this time patient continues to feel nauseous but has not vomited since her transfer her. Currently states pain is well controlled. Denies any headaches, fevers, chills, CP or SOB.    Name of GYN Physician: Dr. Ocasio, [facility], Phone: [phone]    OBHx: G0, virginal  GYNHx: Denies fibroids, cysts, endometriosis, STI's, Abnormal pap smears.  PMH: pulmonary valve stenosis, heart murmur, anxiety, depression Paranoid schizophrenia  PSH: denies  Meds: Prozac, Risperdal, reports compliance with medications at home  All: NSAIDs (angioedema)  Famhx: denies family history of breast, uterine, ovarian, colon cancers  Soc: lives at home with mother who is hard of hearing. gives permission for us to contact her brother Jevon Call  HCM: last pap 7/23/2018 NIL per phone conversation w/ pt's OB/GYN office. last mammo 1 year ago wnl per patient, never had colonoscopy (10 Sep 2020 00:35)      PAST MEDICAL & SURGICAL HISTORY:  H/O pulmonary valve stenosis    Heart murmur    Anxiety    Paranoid schizophrenia    No significant past surgical history        FAMILY HISTORY:  FH: skin cancer    FH: diabetes mellitus        SOCIAL HISTORY:  [ ] Denies Smoking, Alcohol, or Drug Use    Allergies    ibuprofen (Other)  NSAIDs (Unknown)    Intolerances        PAIN MEDICATIONS:  acetaminophen  IVPB .. 1000 milliGRAM(s) IV Intermittent once  acetaminophen  IVPB .. 1000 milliGRAM(s) IV Intermittent once  FLUoxetine 40 milliGRAM(s) Oral daily  HYDROmorphone PCA (1 mG/mL) 30 milliLiter(s) PCA Continuous PCA Continuous  HYDROmorphone PCA (1 mG/mL) Rescue Clinician Bolus 0.5 milliGRAM(s) IV Push every 15 minutes PRN  ondansetron Injectable 4 milliGRAM(s) IV Push every 6 hours  ondansetron Injectable 4 milliGRAM(s) IV Push every 6 hours  risperiDONE   Tablet 1 milliGRAM(s) Oral daily    Heme:  enoxaparin Injectable 40 milliGRAM(s) SubCutaneous daily    Antibiotics:    Cardiovascular:    GI:  pantoprazole    Tablet 40 milliGRAM(s) Oral before breakfast    Endocrine:    All Other Medications:  lactated ringers. 1000 milliLiter(s) IV Continuous <Continuous>  naloxone Injectable 0.1 milliGRAM(s) IV Push every 3 minutes PRN  petrolatum white Ointment 1 Application(s) Topical four times a day PRN  sodium chloride 0.9% lock flush 3 milliLiter(s) IV Push every 8 hours      Vital Signs Last 24 Hrs  T(C): 36.9 (16 Sep 2020 11:41), Max: 36.9 (15 Sep 2020 13:30)  T(F): 98.5 (16 Sep 2020 11:41), Max: 98.5 (16 Sep 2020 11:41)  HR: 102 (16 Sep 2020 11:41) (84 - 118)  BP: 148/77 (16 Sep 2020 11:41) (103/50 - 148/77)  BP(mean): 79 (15 Sep 2020 21:30) (57 - 79)  RR: 18 (16 Sep 2020 11:41) (16 - 44)  SpO2: 95% (16 Sep 2020 11:41) (94% - 99%)    PAIN SCORE:  10/10     see chart           Physical Exam: A & O x 3 in some discomfort    LABS:                          8.8    24.03 )-----------( 439      ( 16 Sep 2020 05:34 )             28.6     09-16    136  |  105  |  7   ----------------------------<  129<H>  4.3   |  21<L>  |  0.43<L>    Ca    7.7<L>      16 Sep 2020 05:34  Phos  2.9     09-16  Mg     1.8     09-16      Impression/Plan: Patient denies taking any pain medications at home.  Denies smoking, drinking alcohol or any illicit drug use. Pain not adequately relieved with current opioid treatment regimen. Agree with plan to begin Hydromorphone IV PCA and reevaluate by Pain Service tomorrow.    Patient was seen 09-16-20 @ 12:15 Chief Complaint:  unrelieved surgical pain    HPI:  REBECCA CALL  45y  Female [id number]    HPI: 46 y/o G0, LMP 96/20, admitted with abdominal pain. Patient s/p ED visit 9/4 with same complaint. Found to have large complex multiseptated pelvic mass with possible peritoneal carcinomatosis at that time concerning for ovarian malignancy. GYN consulted, recommended tumor markers and outpatient follow up with GYN ONC. Patient admitted today for persistant abdominal pain. Patient reports that pain since ED visit has continued and has not responded to Tylenol and Oxy. Patient not taking NSAIDs as she is allergic. Patient also endorses nausea with infrequent vomiting. Unable to tolerating full PO secondary to pain and decreased appetite. Also reports not passing gas or having a bowel movement since three days ago. Normally patient has a bowel movement daily. Reports small caliber stools. Denies bloody stool, tarry stools. Denies fevers, chills, urinary symptoms. Endorses some shortness of breath when laying flat. Denies mood symptoms currently.     Interval Events  Pt was transferred to [facility] for pre-op workup as well as possible OR procedure. At this time patient continues to feel nauseous but has not vomited since her transfer her. Currently states pain is well controlled. Denies any headaches, fevers, chills, CP or SOB.    Name of GYN Physician: Dr. Ocasio, [facility], Phone: [phone]    OBHx: G0, virginal  GYNHx: Denies fibroids, cysts, endometriosis, STI's, Abnormal pap smears.  PMH: pulmonary valve stenosis, heart murmur, anxiety, depression Paranoid schizophrenia  PSH: denies  Meds: Prozac, Risperdal, reports compliance with medications at home  All: NSAIDs (angioedema)  Famhx: denies family history of breast, uterine, ovarian, colon cancers  Soc: lives at home with mother who is hard of hearing. gives permission for us to contact her brother Jevon Call  HCM: last pap 7/23/2018 NIL per phone conversation w/ pt's OB/GYN office. last mammo 1 year ago wnl per patient, never had colonoscopy (10 Sep 2020 00:35)      PAST MEDICAL & SURGICAL HISTORY:  H/O pulmonary valve stenosis    Heart murmur    Anxiety    Paranoid schizophrenia    No significant past surgical history        FAMILY HISTORY:  FH: skin cancer    FH: diabetes mellitus        SOCIAL HISTORY:  [ ] Denies Smoking, Alcohol, or Drug Use    Allergies    ibuprofen (Other)  NSAIDs (Unknown)    Intolerances        PAIN MEDICATIONS:  acetaminophen  IVPB .. 1000 milliGRAM(s) IV Intermittent once  acetaminophen  IVPB .. 1000 milliGRAM(s) IV Intermittent once  FLUoxetine 40 milliGRAM(s) Oral daily  HYDROmorphone PCA (1 mG/mL) 30 milliLiter(s) PCA Continuous PCA Continuous  HYDROmorphone PCA (1 mG/mL) Rescue Clinician Bolus 0.5 milliGRAM(s) IV Push every 15 minutes PRN  ondansetron Injectable 4 milliGRAM(s) IV Push every 6 hours  ondansetron Injectable 4 milliGRAM(s) IV Push every 6 hours  risperiDONE   Tablet 1 milliGRAM(s) Oral daily    Heme:  enoxaparin Injectable 40 milliGRAM(s) SubCutaneous daily    Antibiotics:    Cardiovascular:    GI:  pantoprazole    Tablet 40 milliGRAM(s) Oral before breakfast    Endocrine:    All Other Medications:  lactated ringers. 1000 milliLiter(s) IV Continuous <Continuous>  naloxone Injectable 0.1 milliGRAM(s) IV Push every 3 minutes PRN  petrolatum white Ointment 1 Application(s) Topical four times a day PRN  sodium chloride 0.9% lock flush 3 milliLiter(s) IV Push every 8 hours      Vital Signs Last 24 Hrs  T(C): 36.9 (16 Sep 2020 11:41), Max: 36.9 (15 Sep 2020 13:30)  T(F): 98.5 (16 Sep 2020 11:41), Max: 98.5 (16 Sep 2020 11:41)  HR: 102 (16 Sep 2020 11:41) (84 - 118)  BP: 148/77 (16 Sep 2020 11:41) (103/50 - 148/77)  BP(mean): 79 (15 Sep 2020 21:30) (57 - 79)  RR: 18 (16 Sep 2020 11:41) (16 - 44)  SpO2: 95% (16 Sep 2020 11:41) (94% - 99%)    PAIN SCORE:  10/10     see chart           Physical Exam: A & O x 3 in some discomfort  I- stop reviewed    LABS:                          8.8    24.03 )-----------( 439      ( 16 Sep 2020 05:34 )             28.6     09-16    136  |  105  |  7   ----------------------------<  129<H>  4.3   |  21<L>  |  0.43<L>    Ca    7.7<L>      16 Sep 2020 05:34  Phos  2.9     09-16  Mg     1.8     09-16      Impression/Plan: Patient denies taking any pain medications at home.  Denies smoking, drinking alcohol or any illicit drug use. Pain not adequately relieved with current opioid treatment regimen. Agree with plan to begin Hydromorphone IV PCA and reevaluate by Pain Service tomorrow.    Patient was seen 09-16-20 @ 12:15

## 2020-09-16 NOTE — PROGRESS NOTE ADULT - ASSESSMENT
46y/o F hx painful enlarging pelvic mass now POD #1 s/p exploratory laparotomy demonstrating extensive endometriosis with endometrioma, ILEANA, BSO, appendectomy for incidentally found dilated tortuous appendix.    - Pain control  - Monitor GI function  - Advance diet as tolerated  - Encourage ambulation  - Care per primary team    A Team Surgery [id number]

## 2020-09-16 NOTE — PROGRESS NOTE ADULT - SUBJECTIVE AND OBJECTIVE BOX
Subjective: Patient seen and examined. No new events except as noted.     REVIEW OF SYSTEMS:    CONSTITUTIONAL: + weakness, fevers or chills  EYES/ENT: No visual changes;  No vertigo or throat pain   NECK: No pain or stiffness  RESPIRATORY: No cough, wheezing, hemoptysis; No shortness of breath  CARDIOVASCULAR: No chest pain or palpitations  GASTROINTESTINAL: + abdominal or epigastric pain. No nausea, vomiting, or hematemesis; No diarrhea or constipation. No melena or hematochezia.  GENITOURINARY: No dysuria, frequency or hematuria  NEUROLOGICAL: No numbness or weakness  SKIN: No itching, burning, rashes, or lesions   All other review of systems is negative unless indicated above.    MEDICATIONS:  MEDICATIONS  (STANDING):  acetaminophen  IVPB .. 1000 milliGRAM(s) IV Intermittent once  enoxaparin Injectable 40 milliGRAM(s) SubCutaneous daily  FLUoxetine 40 milliGRAM(s) Oral daily  HYDROmorphone PCA (1 mG/mL) 30 milliLiter(s) PCA Continuous PCA Continuous  lactated ringers. 1000 milliLiter(s) (75 mL/Hr) IV Continuous <Continuous>  ondansetron Injectable 4 milliGRAM(s) IV Push every 6 hours  ondansetron Injectable 4 milliGRAM(s) IV Push every 6 hours  pantoprazole    Tablet 40 milliGRAM(s) Oral before breakfast  risperiDONE   Tablet 1 milliGRAM(s) Oral daily  sodium chloride 0.9% lock flush 3 milliLiter(s) IV Push every 8 hours      PHYSICAL EXAM:  T(C): 36.6 (09-16-20 @ 16:00), Max: 36.9 (09-16-20 @ 11:41)  HR: 117 (09-16-20 @ 16:00) (84 - 118)  BP: 149/77 (09-16-20 @ 16:00) (103/50 - 149/77)  RR: 18 (09-16-20 @ 16:00) (16 - 44)  SpO2: 93% (09-16-20 @ 16:00) (93% - 99%)  Wt(kg): --  I&O's Summary    15 Sep 2020 07:01  -  16 Sep 2020 07:00  --------------------------------------------------------  IN: 875 mL / OUT: 1130 mL / NET: -255 mL    16 Sep 2020 07:01  -  16 Sep 2020 19:24  --------------------------------------------------------  IN: 0 mL / OUT: 500 mL / NET: -500 mL          Appearance: NAD  HEENT:   Normal oral mucosa, PERRL, EOMI	  Lymphatic: No lymphadenopathy , no edema  Cardiovascular: Normal S1 S2, No JVD, No murmurs , Peripheral pulses palpable 2+ bilaterally  Respiratory: Lungs clear to auscultation, normal effort 	  Gastrointestinal:  Soft, Non-tender, + BS	  Skin: No rashes, No ecchymoses, No cyanosis, warm to touch  Musculoskeletal: Normal range of motion, normal strength  Psychiatry:  Mood & affect appropriate  Ext: No edema      LABS:    CARDIAC MARKERS:                                8.8    24.03 )-----------( 439      ( 16 Sep 2020 05:34 )             28.6     09-16    136  |  105  |  7   ----------------------------<  129<H>  4.3   |  21<L>  |  0.43<L>    Ca    7.7<L>      16 Sep 2020 05:34  Phos  2.9     09-16  Mg     1.8     09-16      proBNP:   Lipid Profile:   HgA1c:   TSH:             TELEMETRY: 	    ECG:  	  RADIOLOGY:   DIAGNOSTIC TESTING:  [ ] Echocardiogram:  [ ]  Catheterization:  [ ] Stress Test:    OTHER:

## 2020-09-16 NOTE — CHART NOTE - NSCHARTNOTEFT_GEN_A_CORE
R1 GYN ONC Chart Note    S: 46yo w/ PMHx paranoid schizophrenia and anxiety/depression s/p ILEANA, BSO, appendectomy. Called by Nurse stating patient is currently suicidal and is thinking of hurting herself. Pt was stressed about her bed and felt like she wanted to hurt herself. Nurse was able to move the patient to the recliner and patient felt better. During my questioning, patient was worried about hurting herself but says that on the recliner its improved. Depressed mood and congruent affect. Pt denies any pain at this time. She is ambulating.    O:  Vitals:  Vital Signs Last 24 Hrs  T(C): 36.9 (16 Sep 2020 20:01), Max: 36.9 (16 Sep 2020 11:41)  T(F): 98.5 (16 Sep 2020 20:01), Max: 98.5 (16 Sep 2020 11:41)  HR: 120 (16 Sep 2020 20:01) (89 - 120)  BP: 149/78 (16 Sep 2020 20:01) (130/66 - 149/78)  BP(mean): 79 (15 Sep 2020 21:30) (79 - 79)  RR: 18 (16 Sep 2020 20:01) (16 - 44)  SpO2: 91% (16 Sep 2020 20:01) (91% - 99%)    MEDICATIONS  (STANDING):  enoxaparin Injectable 40 milliGRAM(s) SubCutaneous daily  FLUoxetine 40 milliGRAM(s) Oral daily  HYDROmorphone PCA (1 mG/mL) 30 milliLiter(s) PCA Continuous PCA Continuous  lactated ringers. 1000 milliLiter(s) (75 mL/Hr) IV Continuous <Continuous>  ondansetron Injectable 4 milliGRAM(s) IV Push every 6 hours  ondansetron Injectable 4 milliGRAM(s) IV Push every 6 hours  pantoprazole    Tablet 40 milliGRAM(s) Oral before breakfast  risperiDONE   Tablet 1 milliGRAM(s) Oral daily  sodium chloride 0.9% lock flush 3 milliLiter(s) IV Push every 8 hours    Plan: Psychiatry was reconsulted and stated to put patient on 1:1 and they will round on patient in AM for new recommendations.    jorje Trinidad and Dr.Sakaris Orville Moreira PGY2

## 2020-09-16 NOTE — PROGRESS NOTE ADULT - PROBLEM SELECTOR PLAN 1
With likely carcinomatosis and ascites. Elevated tumor markers raise concern for ovarian source. EGD showing esophagitis/gastritis and colonoscopy a 3mm polyp.  - Pt is s/p ILEANA/BSO with open appendectomy.  - Abd pain controlled; continue current analgesia and monitor symptoms

## 2020-09-16 NOTE — PROVIDER CONTACT NOTE (OTHER) - ASSESSMENT
Patient has a flat affect, is diaphoretic, body is tense.
Denies CP/SOB, NAD noted.  Other vitals stable.
patient reports feeling anxious. . patient denies feelings of self harm.

## 2020-09-16 NOTE — PROGRESS NOTE ADULT - SUBJECTIVE AND OBJECTIVE BOX
ANESTHESIA POSTOP CHECK    45y Female POSTOP DAY 1 S/P ex lap/ILEANA/BSH    Vital Signs Last 24 Hrs  T(C): 36.6 (16 Sep 2020 08:06), Max: 36.9 (15 Sep 2020 13:30)  T(F): 97.9 (16 Sep 2020 08:06), Max: 98.4 (15 Sep 2020 13:30)  HR: 118 (16 Sep 2020 06:04) (79 - 118)  BP: 138/77 (16 Sep 2020 08:06) (103/50 - 148/71)  BP(mean): 79 (15 Sep 2020 21:30) (57 - 79)  RR: 16 (16 Sep 2020 08:06) (16 - 44)  SpO2: 94% (16 Sep 2020 06:04) (94% - 99%)  I&O's Summary    15 Sep 2020 07:01  -  16 Sep 2020 07:00  --------------------------------------------------------  IN: 875 mL / OUT: 1130 mL / NET: -255 mL    16 Sep 2020 07:01  -  16 Sep 2020 10:27  --------------------------------------------------------  IN: 0 mL / OUT: 250 mL / NET: -250 mL        [x ] NO APPARENT ANESTHESIA COMPLICATIONS      Comments:

## 2020-09-16 NOTE — PROGRESS NOTE ADULT - PROBLEM SELECTOR PLAN 1
s/p ex-lap w/extensive endometriosis w/endometrioma, ILEANA, BSO, appendectomy  pain control prn/zofran prn   monitor return of gi fxn   oob/ambulate as tolerated   cont post-surgical care per GYN/Onc and Surgery teams; care appreciated

## 2020-09-16 NOTE — PROGRESS NOTE ADULT - PROBLEM SELECTOR PLAN 1
Neuro: Inadequate pain control overnight with IV Tylenol, awaiting PCA. Dilaudid IVP administered this morning.   Psych: PMHx of paranoid schizophrenia, anxiety and depression: Will continue home Risperdal and Prozac this morning.   - Appreciate psych recs (9/12: c/w current meds, no further role for medication optimization)  CV: Tachycardia this morning. AM CBC 8.8/28.6 (10.6/35.1 preop)   - PMHx of pulmonic stenosis: TTE (9/10) showing normal pulmonic valve and mild to moderate TR; Cardiology stating that patient is an acceptable surgical candidate from cardiac perspective. Appreciate Cards recs.  Pulm: Saturating well on RA. Increase incentive spirometry.  FEN: LR@125cc/hr. Electrolyte repletion PRN.   GI: Tolerating clear liquid diet, endorsing nausea overnight. Standing Zofran q6h for nausea. Continue Protonix 40mg PO qD.  - s/p Endoscopy/Colonoscopy (9/11): No infiltration or masses seen on colonoscopy; will f/u bx of esophagitis, gastric polyps, and 3mm colonic polyp.   : Darnell catheter in situ, adequate urine output overnight.   ID: No active issues. COVID negative (9/13)  Heme: Continue HSQ and Venodynes for DVT ppx.     Dispo: Continue inpatient management, for PCA today for postoperative pain control.     Patient seen and evaluated with GYN Oncology team.   Mauricio, PGY-2. Neuro: Inadequate pain control overnight with IV Tylenol, awaiting PCA. Dilaudid IVP administered this morning.   Psych: PMHx of paranoid schizophrenia, anxiety and depression: Will continue home Risperdal and Prozac this morning.   - Appreciate psych recs (9/12: c/w current meds, no further role for medication optimization)  CV: Tachycardia this morning. AM CBC 8.8/28.6 (10.6/35.1 preop)   - PMHx of pulmonic stenosis: TTE (9/10) showing normal pulmonic valve and mild to moderate TR; Cardiology stating that patient is an acceptable surgical candidate from cardiac perspective. Appreciate Cards recs.  Pulm: Saturating well on RA. Increase incentive spirometry.  FEN: LR@125cc/hr. Electrolyte repletion PRN.   GI: Tolerating clear liquid diet, endorsing nausea overnight. Standing Zofran q6h for nausea. Will consider advancing diet if nausea improves. Continue Protonix 40mg PO qD.  - s/p Endoscopy/Colonoscopy (9/11): No infiltration or masses seen on colonoscopy; will f/u bx of esophagitis, gastric polyps, and 3mm colonic polyp.   : Darnell catheter in situ, adequate urine output overnight. Will discontinue today.   ID: No active issues. COVID negative (9/13)  Heme: Continue Venodynes for DVT ppx, will transition from HSQ to Lovenox.    Dispo: Continue inpatient management, for PCA today for postoperative pain control.     Patient seen and evaluated with GYN Oncology team.   Mauricio, PGY-2. Neuro: Inadequate pain control overnight with IV Tylenol, awaiting PCA. Dilaudid IVP administered this morning.   Psych: PMHx of paranoid schizophrenia, anxiety and depression: Will continue home Risperdal and Prozac this morning.   - Appreciate psych recs (9/12: c/w current meds, no further role for medication optimization)  CV: Tachycardia this morning. AM CBC 8.8/28.6 (10.6/35.1 preop)   - PMHx of pulmonic stenosis: TTE (9/10) showing normal pulmonic valve and mild to moderate TR; Cardiology stating that patient is an acceptable surgical candidate from cardiac perspective. Appreciate Cards recs.  Pulm: Saturating well on RA. Increase incentive spirometry.  FEN: LR@125cc/hr. Electrolyte repletion PRN.   GI: Tolerating clear liquid diet, endorsing nausea overnight. Standing Zofran q6h for nausea. Will consider advancing diet if nausea improves. Continue Protonix 40mg PO qD.  - s/p Endoscopy/Colonoscopy (9/11): No infiltration or masses seen on colonoscopy; will f/u bx of esophagitis, gastric polyps, and 3mm colonic polyp.   : Darnell catheter in situ, adequate urine output overnight. Will discontinue today.   ID: No active issues. COVID negative (9/13)  Heme: Continue Venodynes for DVT ppx, will transition from HSQ to Lovenox.    Dispo: Continue inpatient management, for PCA today for postoperative pain control, will discontinue Darnell catheter and transition from HSQ to Lovenox for DVT prophylaxis. Will consider advancing diet if nausea improves.     Patient seen and evaluated with GYN Oncology team.   Mauricio, PGY-2.

## 2020-09-16 NOTE — PROGRESS NOTE ADULT - SUBJECTIVE AND OBJECTIVE BOX
Patient is a 45y old  Female who presents with a chief complaint of concern for ovarian malignancy (16 Sep 2020 10:27)      SUBJECTIVE / OVERNIGHT EVENTS: No overnight event. Pain tolerable.    MEDICATIONS  (STANDING):  acetaminophen  IVPB .. 1000 milliGRAM(s) IV Intermittent once  acetaminophen  IVPB .. 1000 milliGRAM(s) IV Intermittent once  enoxaparin Injectable 40 milliGRAM(s) SubCutaneous daily  FLUoxetine 40 milliGRAM(s) Oral daily  lactated ringers. 1000 milliLiter(s) (125 mL/Hr) IV Continuous <Continuous>  ondansetron Injectable 4 milliGRAM(s) IV Push every 6 hours  ondansetron Injectable 4 milliGRAM(s) IV Push every 6 hours  pantoprazole    Tablet 40 milliGRAM(s) Oral before breakfast  risperiDONE   Tablet 1 milliGRAM(s) Oral daily  sodium chloride 0.9% lock flush 3 milliLiter(s) IV Push every 8 hours    MEDICATIONS  (PRN):  petrolatum white Ointment 1 Application(s) Topical four times a day PRN irritation      CAPILLARY BLOOD GLUCOSE        I&O's Summary    15 Sep 2020 07:01  -  16 Sep 2020 07:00  --------------------------------------------------------  IN: 875 mL / OUT: 1130 mL / NET: -255 mL    16 Sep 2020 07:01  -  16 Sep 2020 11:02  --------------------------------------------------------  IN: 0 mL / OUT: 350 mL / NET: -350 mL        PHYSICAL EXAM:  Vital Signs Last 24 Hrs  T(C): 36.6 (16 Sep 2020 08:06), Max: 36.9 (15 Sep 2020 13:30)  T(F): 97.9 (16 Sep 2020 08:06), Max: 98.4 (15 Sep 2020 13:30)  HR: 118 (16 Sep 2020 06:04) (79 - 118)  BP: 138/77 (16 Sep 2020 08:06) (103/50 - 148/71)  BP(mean): 79 (15 Sep 2020 21:30) (57 - 79)  RR: 16 (16 Sep 2020 08:06) (16 - 44)  SpO2: 94% (16 Sep 2020 06:04) (94% - 99%)  CONSTITUTIONAL: NAD, well-developed, well-groomed  RESPIRATORY: Normal respiratory effort; lungs are clear to auscultation bilaterally  CARDIOVASCULAR: Regular rate and rhythm, normal S1 and S2, no murmur/rub/gallop; No lower extremity edema; Peripheral pulses are 2+ bilaterally  ABDOMEN: Nontender to palpation, normoactive bowel sounds, no rebound/guarding; No hepatosplenomegaly  MUSCULOSKELETAL:  Normal gait; no clubbing or cyanosis of digits; no joint swelling or tenderness to palpation  PSYCH: A+O to person, place, and time; affect limited     LABS:                        8.8    24.03 )-----------( 439      ( 16 Sep 2020 05:34 )             28.6     09-16    136  |  105  |  7   ----------------------------<  129<H>  4.3   |  21<L>  |  0.43<L>    Ca    7.7<L>      16 Sep 2020 05:34  Phos  2.9     09-16  Mg     1.8     09-16                  RADIOLOGY & ADDITIONAL TESTS:  Results Reviewed:   Imaging Personally Reviewed:  Electrocardiogram Personally Reviewed:    COORDINATION OF CARE:  Care Discussed with Consultants/Other Providers [Y/N]:  Prior or Outpatient Records Reviewed [Y/N]:

## 2020-09-16 NOTE — PROVIDER CONTACT NOTE (OTHER) - SITUATION
Patient states "I want to die. I've never felt so uncomfortable, I want to die." Patient denies feeling pain and is not using PCA pump.
Patient received as transfer from OSH, tachycardic to 108.
patient returned from PACU, VSS, s/p hysterectomy and b/l oophorectomy. hx of paranoid schizophrenia.

## 2020-09-17 DIAGNOSIS — Z98.890 OTHER SPECIFIED POSTPROCEDURAL STATES: ICD-10-CM

## 2020-09-17 LAB
ANION GAP SERPL CALC-SCNC: 14 MMO/L — SIGNIFICANT CHANGE UP (ref 7–14)
BASOPHILS # BLD AUTO: 0.02 K/UL — SIGNIFICANT CHANGE UP (ref 0–0.2)
BASOPHILS NFR BLD AUTO: 0.1 % — SIGNIFICANT CHANGE UP (ref 0–2)
BLD GP AB SCN SERPL QL: NEGATIVE — SIGNIFICANT CHANGE UP
BUN SERPL-MCNC: 5 MG/DL — LOW (ref 7–23)
CALCIUM SERPL-MCNC: 8.1 MG/DL — LOW (ref 8.4–10.5)
CHLORIDE SERPL-SCNC: 100 MMOL/L — SIGNIFICANT CHANGE UP (ref 98–107)
CO2 SERPL-SCNC: 22 MMOL/L — SIGNIFICANT CHANGE UP (ref 22–31)
CREAT SERPL-MCNC: 0.44 MG/DL — LOW (ref 0.5–1.3)
EOSINOPHIL # BLD AUTO: 0.08 K/UL — SIGNIFICANT CHANGE UP (ref 0–0.5)
EOSINOPHIL NFR BLD AUTO: 0.4 % — SIGNIFICANT CHANGE UP (ref 0–6)
GLUCOSE SERPL-MCNC: 127 MG/DL — HIGH (ref 70–99)
HCT VFR BLD CALC: 26.2 % — LOW (ref 34.5–45)
HGB BLD-MCNC: 8.2 G/DL — LOW (ref 11.5–15.5)
IMM GRANULOCYTES NFR BLD AUTO: 1.8 % — HIGH (ref 0–1.5)
LYMPHOCYTES # BLD AUTO: 0.98 K/UL — LOW (ref 1–3.3)
LYMPHOCYTES # BLD AUTO: 5.2 % — LOW (ref 13–44)
MAGNESIUM SERPL-MCNC: 2 MG/DL — SIGNIFICANT CHANGE UP (ref 1.6–2.6)
MCHC RBC-ENTMCNC: 28.2 PG — SIGNIFICANT CHANGE UP (ref 27–34)
MCHC RBC-ENTMCNC: 31.3 % — LOW (ref 32–36)
MCV RBC AUTO: 90 FL — SIGNIFICANT CHANGE UP (ref 80–100)
MONOCYTES # BLD AUTO: 0.85 K/UL — SIGNIFICANT CHANGE UP (ref 0–0.9)
MONOCYTES NFR BLD AUTO: 4.5 % — SIGNIFICANT CHANGE UP (ref 2–14)
NEUTROPHILS # BLD AUTO: 16.68 K/UL — HIGH (ref 1.8–7.4)
NEUTROPHILS NFR BLD AUTO: 88 % — HIGH (ref 43–77)
NRBC # FLD: 0 K/UL — SIGNIFICANT CHANGE UP (ref 0–0)
PHOSPHATE SERPL-MCNC: 2.1 MG/DL — LOW (ref 2.5–4.5)
PLATELET # BLD AUTO: 516 K/UL — HIGH (ref 150–400)
PMV BLD: 10.6 FL — SIGNIFICANT CHANGE UP (ref 7–13)
POTASSIUM SERPL-MCNC: 4 MMOL/L — SIGNIFICANT CHANGE UP (ref 3.5–5.3)
POTASSIUM SERPL-SCNC: 4 MMOL/L — SIGNIFICANT CHANGE UP (ref 3.5–5.3)
RBC # BLD: 2.91 M/UL — LOW (ref 3.8–5.2)
RBC # FLD: 13.9 % — SIGNIFICANT CHANGE UP (ref 10.3–14.5)
RH IG SCN BLD-IMP: POSITIVE — SIGNIFICANT CHANGE UP
SODIUM SERPL-SCNC: 136 MMOL/L — SIGNIFICANT CHANGE UP (ref 135–145)
WBC # BLD: 18.96 K/UL — HIGH (ref 3.8–10.5)
WBC # FLD AUTO: 18.96 K/UL — HIGH (ref 3.8–10.5)

## 2020-09-17 PROCEDURE — 93010 ELECTROCARDIOGRAM REPORT: CPT

## 2020-09-17 PROCEDURE — 99232 SBSQ HOSP IP/OBS MODERATE 35: CPT

## 2020-09-17 PROCEDURE — 99233 SBSQ HOSP IP/OBS HIGH 50: CPT

## 2020-09-17 RX ORDER — SODIUM,POTASSIUM PHOSPHATES 278-250MG
1 POWDER IN PACKET (EA) ORAL ONCE
Refills: 0 | Status: COMPLETED | OUTPATIENT
Start: 2020-09-17 | End: 2020-09-17

## 2020-09-17 RX ORDER — OXYCODONE HYDROCHLORIDE 5 MG/1
5 TABLET ORAL
Refills: 0 | Status: DISCONTINUED | OUTPATIENT
Start: 2020-09-17 | End: 2020-09-19

## 2020-09-17 RX ORDER — OXYCODONE HYDROCHLORIDE 5 MG/1
10 TABLET ORAL
Refills: 0 | Status: DISCONTINUED | OUTPATIENT
Start: 2020-09-17 | End: 2020-09-19

## 2020-09-17 RX ADMIN — HYDROMORPHONE HYDROCHLORIDE 30 MILLILITER(S): 2 INJECTION INTRAMUSCULAR; INTRAVENOUS; SUBCUTANEOUS at 07:32

## 2020-09-17 RX ADMIN — SODIUM CHLORIDE 75 MILLILITER(S): 9 INJECTION, SOLUTION INTRAVENOUS at 23:35

## 2020-09-17 RX ADMIN — ONDANSETRON 4 MILLIGRAM(S): 8 TABLET, FILM COATED ORAL at 12:04

## 2020-09-17 RX ADMIN — SODIUM CHLORIDE 3 MILLILITER(S): 9 INJECTION INTRAMUSCULAR; INTRAVENOUS; SUBCUTANEOUS at 05:12

## 2020-09-17 RX ADMIN — ONDANSETRON 4 MILLIGRAM(S): 8 TABLET, FILM COATED ORAL at 05:17

## 2020-09-17 RX ADMIN — SODIUM CHLORIDE 3 MILLILITER(S): 9 INJECTION INTRAMUSCULAR; INTRAVENOUS; SUBCUTANEOUS at 21:15

## 2020-09-17 RX ADMIN — ENOXAPARIN SODIUM 40 MILLIGRAM(S): 100 INJECTION SUBCUTANEOUS at 12:05

## 2020-09-17 RX ADMIN — Medication 40 MILLIGRAM(S): at 12:05

## 2020-09-17 RX ADMIN — SODIUM CHLORIDE 3 MILLILITER(S): 9 INJECTION INTRAMUSCULAR; INTRAVENOUS; SUBCUTANEOUS at 13:01

## 2020-09-17 RX ADMIN — ONDANSETRON 4 MILLIGRAM(S): 8 TABLET, FILM COATED ORAL at 17:24

## 2020-09-17 RX ADMIN — RISPERIDONE 1 MILLIGRAM(S): 4 TABLET ORAL at 12:05

## 2020-09-17 RX ADMIN — ONDANSETRON 4 MILLIGRAM(S): 8 TABLET, FILM COATED ORAL at 23:35

## 2020-09-17 RX ADMIN — PANTOPRAZOLE SODIUM 40 MILLIGRAM(S): 20 TABLET, DELAYED RELEASE ORAL at 05:17

## 2020-09-17 RX ADMIN — Medication 1 TABLET(S): at 17:36

## 2020-09-17 NOTE — PROGRESS NOTE BEHAVIORAL HEALTH - NSBHCONSULTMEDSEVERE_PSY_A_CORE FT
patient likely will not need, but can offer risperidone 0.5 mg Q6H PRN for severe agitation patient likely will not need, but can offer risperidone 0.5 mg PO Q8H PRN for severe agitation if qtc <500

## 2020-09-17 NOTE — PROGRESS NOTE ADULT - ASSESSMENT
46y/o F hx painful enlarging pelvic mass now POD #1 s/p exploratory laparotomy demonstrating extensive endometriosis with endometrioma, ILEANA, BSO, appendectomy for incidentally found dilated tortuous appendix.    - Pain control  - Monitor GI function  - Advance diet as tolerated  - Encourage ambulation  - Appreciate Cardiology recs  - Appreciate Psychiatry input  - Care per primary team    A Team Surgery [id number] 44y/o F hx painful enlarging pelvic mass now POD #2 s/p exploratory laparotomy demonstrating extensive endometriosis with endometrioma, ILEANA, BSO, appendectomy for incidentally found dilated tortuous appendix. Persistently tachycardic overnight, but remains afebrile, no signs of bleeding on exam or labs, no hypoxia/dyspnea.    - Pain control  - Monitor GI function  - Advance diet as tolerated  - Encourage ambulation  - Appreciate Cardiology recs  - Appreciate Psychiatry input  - Care per primary team    [facility] [id number]

## 2020-09-17 NOTE — PROGRESS NOTE ADULT - SUBJECTIVE AND OBJECTIVE BOX
Anesthesia Pain Management Service    SUBJECTIVE: Patient is doing well with IV PCA and no significant problems reported.    Pain Scale Score	At rest: 2__ 	With Activity: ___ 	[X ] Refer to charted pain scores    THERAPY:    [ ] IV PCA Morphine		[ ] 5 mg/mL	[ ] 1 mg/mL  [X ] IV PCA Hydromorphone	[ ] 5 mg/mL	[X ] 1 mg/mL  [ ] IV PCA Fentanyl		[ ] 50 micrograms/mL    Demand dose __0.2_ lockout __6_ (minutes) Continuous Rate _0__ Total: _0.2__   mg used (in past 24 hrs)      MEDICATIONS  (STANDING):  enoxaparin Injectable 40 milliGRAM(s) SubCutaneous daily  FLUoxetine 40 milliGRAM(s) Oral daily  lactated ringers. 1000 milliLiter(s) (75 mL/Hr) IV Continuous <Continuous>  ondansetron Injectable 4 milliGRAM(s) IV Push every 6 hours  ondansetron Injectable 4 milliGRAM(s) IV Push every 6 hours  pantoprazole    Tablet 40 milliGRAM(s) Oral before breakfast  potassium phosphate / sodium phosphate Tablet (K-PHOS No. 2) 1 Tablet(s) Oral once  risperiDONE   Tablet 1 milliGRAM(s) Oral daily  sodium chloride 0.9% lock flush 3 milliLiter(s) IV Push every 8 hours    MEDICATIONS  (PRN):  oxyCODONE    IR 5 milliGRAM(s) Oral every 3 hours PRN Moderate Pain (4 - 6)  oxyCODONE    IR 10 milliGRAM(s) Oral every 3 hours PRN Severe Pain (7 - 10)  petrolatum white Ointment 1 Application(s) Topical four times a day PRN irritation       OBJECTIVE: laying in bed     Sedation Score:	[ X] Alert	[ ] Drowsy 	[ ] Arousable	[ ] Asleep	[ ] Unresponsive    Side Effects:	[X ] None	[ ] Nausea	[ ] Vomiting	[ ] Pruritus  		[ ] Other:    Vital Signs Last 24 Hrs  T(C): 36.9 (17 Sep 2020 08:58), Max: 37 (17 Sep 2020 05:13)  T(F): 98.5 (17 Sep 2020 08:58), Max: 98.6 (17 Sep 2020 05:13)  HR: 110 (17 Sep 2020 08:58) (102 - 120)  BP: 159/90 (17 Sep 2020 08:58) (133/65 - 164/83)  BP(mean): 81 (16 Sep 2020 23:32) (81 - 81)  RR: 18 (17 Sep 2020 08:58) (18 - 18)  SpO2: 94% (17 Sep 2020 08:58) (91% - 96%)    ASSESSMENT/ PLAN    Therapy to  be:	[ ] Continue   [ X] Discontinued   [X ] Change to prn Analgesics    Documentation and Verification of current medications:   [X] Done	[ ] Not done, not elligible    Comments: PRN Oral/IV opioids and/or Adjuvant medication to be ordered at this point.    Progress Note written now but Patient was seen earlier.

## 2020-09-17 NOTE — PROGRESS NOTE ADULT - ASSESSMENT
46 y/o now POD#2 s/p Exploratory Laparotomy, Total Abdominal Hysterectomy, Bilateral Salpingo-oophorectomy and Appendectomy on 9/15/2020, where she was found to have extensive endometriosis (frozen pathology=endometrioma). Patient with passive suicidal ideation overnight, currently on one to one observation. Patient denies any active suicidal ideation at this time, pending Psychiatry evaluation this morning.

## 2020-09-17 NOTE — PROGRESS NOTE BEHAVIORAL HEALTH - NSBHCHARTREVIEWLAB_PSY_A_CORE FT
leukocytotic post surgery, somewhat anemic, thrombocytosis, BMP unconcerning, Ca 8.1, P 2.1 leukocytotic post surgery, somewhat anemic, thrombocytosis,

## 2020-09-17 NOTE — PROGRESS NOTE ADULT - SUBJECTIVE AND OBJECTIVE BOX
Gyn ONC Progress Note POD#2  HD#10     Patient seen and examined at bedside. Patient on one to one observation due to passive suicidal ideation overnight. Psych to follow. Pain well controlled on PCA. OOB to chair. Tolerating sips of water and juice without emesis. Nausea improving. She has not yet passed flatus. Pt denies fever, chills, chest pain, shortness of breath, lightheadedness or dizziness.      Objective:  T(F): 98.6 (09-17-20 @ 05:13), Max: 98.6 (09-17-20 @ 05:13)  HR: 120 (09-17-20 @ 05:13) (102 - 120)  BP: 164/83 (09-17-20 @ 05:13) (133/65 - 164/83)  RR: 18 (09-17-20 @ 05:13) (16 - 18)  SpO2: 93% (09-17-20 @ 05:13) (91% - 96%)  Wt(kg): --  I&O's Summary    15 Sep 2020 07:01  -  16 Sep 2020 07:00  --------------------------------------------------------  IN: 875 mL / OUT: 1130 mL / NET: -255 mL    16 Sep 2020 07:01  -  17 Sep 2020 05:40  --------------------------------------------------------  IN: 375 mL / OUT: 1050 mL / NET: -675 mL      MEDICATIONS  (STANDING):  enoxaparin Injectable 40 milliGRAM(s) SubCutaneous daily  FLUoxetine 40 milliGRAM(s) Oral daily  HYDROmorphone PCA (1 mG/mL) 30 milliLiter(s) PCA Continuous PCA Continuous  lactated ringers. 1000 milliLiter(s) (75 mL/Hr) IV Continuous <Continuous>  ondansetron Injectable 4 milliGRAM(s) IV Push every 6 hours  ondansetron Injectable 4 milliGRAM(s) IV Push every 6 hours  pantoprazole    Tablet 40 milliGRAM(s) Oral before breakfast  risperiDONE   Tablet 1 milliGRAM(s) Oral daily  sodium chloride 0.9% lock flush 3 milliLiter(s) IV Push every 8 hours    MEDICATIONS  (PRN):  HYDROmorphone PCA (1 mG/mL) Rescue Clinician Bolus 0.5 milliGRAM(s) IV Push every 15 minutes PRN for Pain Scale GREATER THAN 6  naloxone Injectable 0.1 milliGRAM(s) IV Push every 3 minutes PRN For ANY of the following changes in patient status:  A. RR LESS THAN 10 breaths per minute, B. Oxygen saturation LESS THAN 90%, C. Sedation score of 6  petrolatum white Ointment 1 Application(s) Topical four times a day PRN irritation      Physical Exam:  Constitutional: NAD, AOx3  CV: RRR S1S2 no m/r/g  Lungs: CTA b/l, good air flow b/l  Abdomen: softly-distended, appropriately-tender. No guarding, no rebound, normal bowel sounds. Midline vertical incision with prineo c/d/i.   Extremities: no lower extremity edema or calf tenderness bilaterally; Venodynes in place    LABS:  09-16    136    |  105    |  7      ----------------------------<  129<H>  4.3     |  21<L>  |  0.43<L>    Ca    7.7<L>      16 Sep 2020 05:34  Phos  2.9       09-16  Mg     1.8       09-16 Gyn ONC Progress Note POD#2  HD#10     Patient seen and examined at bedside. Patient on one to one observation due to passive suicidal ideation overnight. She denies current thoughts of harming herself. Psychiatry to follow. Pain well controlled on PCA. Has ambulated to chair. Tolerating sips of water and juice without emesis. Nausea improving. She has not yet passed flatus. Patient denies fever, chills, chest pain, shortness of breath, lightheadedness or dizziness.      Objective:  T(F): 98.6 (09-17-20 @ 05:13), Max: 98.6 (09-17-20 @ 05:13)  HR: 120 (09-17-20 @ 05:13) (102 - 120)  BP: 164/83 (09-17-20 @ 05:13) (133/65 - 164/83)  RR: 18 (09-17-20 @ 05:13) (16 - 18)  SpO2: 93% (09-17-20 @ 05:13) (91% - 96%)  Wt(kg): --  I&O's Summary    15 Sep 2020 07:01  -  16 Sep 2020 07:00  --------------------------------------------------------  IN: 875 mL / OUT: 1130 mL / NET: -255 mL    16 Sep 2020 07:01  -  17 Sep 2020 05:40  --------------------------------------------------------  IN: 375 mL / OUT: 1050 mL / NET: -675 mL      MEDICATIONS  (STANDING):  enoxaparin Injectable 40 milliGRAM(s) SubCutaneous daily  FLUoxetine 40 milliGRAM(s) Oral daily  HYDROmorphone PCA (1 mG/mL) 30 milliLiter(s) PCA Continuous PCA Continuous  lactated ringers. 1000 milliLiter(s) (75 mL/Hr) IV Continuous <Continuous>  ondansetron Injectable 4 milliGRAM(s) IV Push every 6 hours  ondansetron Injectable 4 milliGRAM(s) IV Push every 6 hours  pantoprazole    Tablet 40 milliGRAM(s) Oral before breakfast  risperiDONE   Tablet 1 milliGRAM(s) Oral daily  sodium chloride 0.9% lock flush 3 milliLiter(s) IV Push every 8 hours    MEDICATIONS  (PRN):  HYDROmorphone PCA (1 mG/mL) Rescue Clinician Bolus 0.5 milliGRAM(s) IV Push every 15 minutes PRN for Pain Scale GREATER THAN 6  naloxone Injectable 0.1 milliGRAM(s) IV Push every 3 minutes PRN For ANY of the following changes in patient status:  A. RR LESS THAN 10 breaths per minute, B. Oxygen saturation LESS THAN 90%, C. Sedation score of 6  petrolatum white Ointment 1 Application(s) Topical four times a day PRN irritation      Physical Exam:  Constitutional: NAD, AOx3  CV: RRR S1S2 no m/r/g  Lungs: CTA b/l, good air flow b/l  Abdomen: softly-distended, appropriately-tender. No guarding, no rebound, normal bowel sounds. Midline vertical incision with prineo c/d/i.   Extremities: no lower extremity edema or calf tenderness bilaterally; Venodynes in place    LABS:  09-16    136    |  105    |  7      ----------------------------<  129<H>  4.3     |  21<L>  |  0.43<L>    Ca    7.7<L>      16 Sep 2020 05:34  Phos  2.9       09-16  Mg     1.8       09-16

## 2020-09-17 NOTE — PROGRESS NOTE ADULT - PROBLEM SELECTOR PLAN 1
s/p ex-lap w/extensive endometriosis w/endometrioma, ILEANA, BSO, appendectomy  pain control prn/zofran prn   monitor return of gi fxn   oob/ambulate as tolerated   cont post-surgical care per GYN/Onc and Surgery teams; care appreciated  clear dietu

## 2020-09-17 NOTE — PROGRESS NOTE BEHAVIORAL HEALTH - RISK ASSESSMENT
Low acute risk of harm to self or suicide. Risk factors include prior psychiatric hospitalization, prior SA, recent endorsed passive SI, acute medical illness. Protective factors include future-oriented, help-seeking, engaged in treatment, denies current suicidal ideation, intent, or plan, psychiatric stability spanning over twenty years, good medication adherence, good social supports, stated reasons to live, fear of pain and death.    Low acute risk of violence or aggression. Patient has no known history of violence or aggression and denies violent, aggressive, or homicidal ideation, intent, or plan. Low acute risk of harm to self or suicide. Risk factors include prior psychiatric hospitalization, prior SA, recent endorsed passive SI, acute medical illness. Protective factors include future-oriented, help-seeking, engaged in treatment, denies current suicidal ideation, intent, or plan, psychiatric stability spanning over twenty years, good medication adherence, good social supports, stated reasons to live, fear of pain and death.    Low acute risk of violence or aggression. Patient has no known history of violence or aggression and denies violent, aggressive, or homicidal ideation, intent, or plan.    in my opinion, pt. is not at acute risk of harm to self or others at this time and does not need an inpatient admission

## 2020-09-17 NOTE — PROGRESS NOTE ADULT - SUBJECTIVE AND OBJECTIVE BOX
GENERAL SURGERY PROGRESS NOTE    S: Overnight patient tachycardic to 120bpm, also c/o suicidal ideation. Psychiatry contacted by primary team. Upon evaluation patient states pain is improved and better controlled today, tolerating clear liquids, denies nausea/vomiting/fevers/chills/chest pain/dyspnea/palpitations/dizziness/lightheadedness. Ambulated yesterday, not yet passing flatus or BM.    O: Vital Signs  T(C): 37 (09-17 @ 05:13), Max: 37 (09-17 @ 05:13)  HR: 120 (09-17 @ 05:13) (102 - 120)  BP: 164/83 (09-17 @ 05:13) (133/65 - 164/83)  RR: 18 (09-17 @ 05:13) (16 - 18)  SpO2: 93% (09-17 @ 05:13) (91% - 96%)  09-16-20 @ 07:01  -  09-17-20 @ 07:00  --------------------------------------------------------  IN: 450 mL / OUT: 1350 mL / NET: -900 mL      General: alert and oriented, NAD  Resp: airway patent, respirations unlabored  CVS: regular rate and rhythm  Abdomen: soft, incisions c/d/i, appropriate post-surgical tenderness  Extremities: no edema  Skin: warm, dry, appropriate color                          8.2    18.96 )-----------( 516      ( 17 Sep 2020 06:00 )             26.2   09-17    136  |  100  |  5<L>  ----------------------------<  127<H>  4.0   |  22  |  0.44<L>    Ca    8.1<L>      17 Sep 2020 06:00  Phos  2.1     09-17  Mg     2.0     09-17

## 2020-09-17 NOTE — PROGRESS NOTE ADULT - SUBJECTIVE AND OBJECTIVE BOX
Subjective: Patient seen and examined. No new events except as noted.   remains on PCA pump   feels ok   no cp or sob   +flatus     REVIEW OF SYSTEMS:    CONSTITUTIONAL: + weakness, fevers or chills  EYES/ENT: No visual changes;  No vertigo or throat pain   NECK: No pain or stiffness  RESPIRATORY: No cough, wheezing, hemoptysis; No shortness of breath  CARDIOVASCULAR: No chest pain or palpitations  GASTROINTESTINAL: No abdominal or epigastric pain. No nausea, vomiting, or hematemesis; No diarrhea or constipation. No melena or hematochezia.  GENITOURINARY: No dysuria, frequency or hematuria  NEUROLOGICAL: No numbness or weakness  SKIN: No itching, burning, rashes, or lesions   All other review of systems is negative unless indicated above.    MEDICATIONS:  MEDICATIONS  (STANDING):  enoxaparin Injectable 40 milliGRAM(s) SubCutaneous daily  FLUoxetine 40 milliGRAM(s) Oral daily  lactated ringers. 1000 milliLiter(s) (75 mL/Hr) IV Continuous <Continuous>  ondansetron Injectable 4 milliGRAM(s) IV Push every 6 hours  ondansetron Injectable 4 milliGRAM(s) IV Push every 6 hours  pantoprazole    Tablet 40 milliGRAM(s) Oral before breakfast  potassium phosphate / sodium phosphate Tablet (K-PHOS No. 2) 1 Tablet(s) Oral once  risperiDONE   Tablet 1 milliGRAM(s) Oral daily  sodium chloride 0.9% lock flush 3 milliLiter(s) IV Push every 8 hours      PHYSICAL EXAM:  T(C): 36.9 (09-17-20 @ 08:58), Max: 37 (09-17-20 @ 05:13)  HR: 110 (09-17-20 @ 08:58) (110 - 120)  BP: 159/90 (09-17-20 @ 08:58) (133/65 - 164/83)  RR: 18 (09-17-20 @ 08:58) (18 - 18)  SpO2: 94% (09-17-20 @ 08:58) (91% - 96%)  Wt(kg): --  I&O's Summary    16 Sep 2020 07:01  -  17 Sep 2020 07:00  --------------------------------------------------------  IN: 450 mL / OUT: 1350 mL / NET: -900 mL    17 Sep 2020 07:01  -  17 Sep 2020 12:00  --------------------------------------------------------  IN: 0 mL / OUT: 200 mL / NET: -200 mL          Appearance: NAD  HEENT:   Normal oral mucosa, PERRL, EOMI	  Lymphatic: No lymphadenopathy , no edema  Cardiovascular: Normal S1 S2, No JVD, No murmurs , Peripheral pulses palpable 2+ bilaterally  Respiratory: Lungs clear to auscultation, normal effort 	  Gastrointestinal:  Soft, +tender, + BS	  Skin: No rashes, No ecchymoses, No cyanosis, warm to touch  Musculoskeletal: Normal range of motion, normal strength  Psychiatry:  Mood & affect appropriate  Ext: No edema      LABS:    CARDIAC MARKERS:                                8.2    18.96 )-----------( 516      ( 17 Sep 2020 06:00 )             26.2     09-17    136  |  100  |  5<L>  ----------------------------<  127<H>  4.0   |  22  |  0.44<L>    Ca    8.1<L>      17 Sep 2020 06:00  Phos  2.1     09-17  Mg     2.0     09-17      proBNP:   Lipid Profile:   HgA1c:   TSH:             TELEMETRY: 	    ECG:  	  RADIOLOGY:   DIAGNOSTIC TESTING:  [ ] Echocardiogram:  [ ]  Catheterization:  [ ] Stress Test:    OTHER:

## 2020-09-17 NOTE — PROGRESS NOTE BEHAVIORAL HEALTH - NSBHCONSULTFOLLOWAFTERCARE_PSY_A_CORE FT
Please follow up with your outpatient psychiatrist Dr. Watkins. Please follow up with your outpatient psychiatrist Dr. Watkins at [place]. Pt. stated that she will make an appointment.

## 2020-09-17 NOTE — PROGRESS NOTE BEHAVIORAL HEALTH - NSBHCHARTREVIEWVS_PSY_A_CORE FT
T(C): 36.9 (17 Sep 2020 08:58), Max: 37 (17 Sep 2020 05:13)  T(F): 98.5 (17 Sep 2020 08:58), Max: 98.6 (17 Sep 2020 05:13)  HR: 110 (17 Sep 2020 08:58) (102 - 120)  BP: 159/90 (17 Sep 2020 08:58) (133/65 - 164/83)  BP(mean): 81 (16 Sep 2020 23:32) (81 - 81)  RR: 18 (17 Sep 2020 08:58) (18 - 18)  SpO2: 94% (17 Sep 2020 08:58) (91% - 96%)

## 2020-09-17 NOTE — PROGRESS NOTE ADULT - PROBLEM SELECTOR PLAN 1
Neuro: Continue PCA for pain control.     Psych: PMHx of paranoid schizophrenia, anxiety and depression and suicide attempts. Patient on one-to-one observation due to passive suicidal ideation overnight. Appreciate Psychiatry evaluation and recommendations. Will continue home Risperdal and Prozac.     CV: Tachycardia to 110s-120. F/u EKG. PMHx of pulmonic stenosis: TTE (9/10) showing normal pulmonic valve and mild to moderate TR; Cardiology stating that patient is an acceptable surgical candidate from cardiac perspective. Appreciate Cardiology recommendations.  Pulm: Saturating well on RA. Increase incentive spirometry.  FEN: LR@75. F/u AM BMP. Electrolyte repletion PRN.   GI: Tolerating clear liquid diet with improving nausea. Standing Zofran q6h for nausea. Will consider advancing diet if nausea improves. Continue Protonix 40mg PO qD.  - s/p Endoscopy/Colonoscopy (9/11): No infiltration or masses seen on colonoscopy; will f/u bioopsy of esophagitis, gastric polyps, and 3mm colonic polyp.   : s/p Darnell catheter, voiding spontaneously, /shift.  ID: No active issues. COVID negative (9/13)  Heme: Lovenox and Venodynes for DVT ppx  Dispo: Continue inpatient care Neuro: Continue PCA for pain control.     Psych: PMHx of paranoid schizophrenia, anxiety and depression and suicide attempts. Patient on one-to-one observation due to passive suicidal ideation overnight. Appreciate Psychiatry evaluation and recommendations. Will continue home Risperdal and Prozac.     CV: Tachycardia to 110s-120. F/u EKG. PMHx of pulmonic stenosis: TTE (9/10) showing normal pulmonic valve and mild to moderate TR; Cardiology stating that patient is an acceptable surgical candidate from cardiac perspective. Appreciate Cardiology recommendations.  Pulm: Saturating well on RA. Increase incentive spirometry.  FEN: LR@75. F/u AM BMP. Electrolyte repletion PRN.   GI: Tolerating clear liquid diet with improving nausea. Standing Zofran q6h for nausea. Will consider advancing diet if nausea improves. Continue Protonix 40mg PO qD.  - s/p Endoscopy/Colonoscopy (9/11): No infiltration or masses seen on colonoscopy; will f/u bioopsy of esophagitis, gastric polyps, and 3mm colonic polyp.   : s/p Darnell catheter, voiding spontaneously, /shift.  ID: No active issues. COVID negative (9/13)  Heme: Lovenox and Venodynes for DVT ppx  Dispo: Continue inpatient care    Олег Dill PGY3 Neuro: Continue PCA and IV tylenol for pain control.     Psych: PMHx of paranoid schizophrenia, anxiety and depression and suicide attempts. Patient on one-to-one observation due to passive suicidal ideation overnight. Pending Psychiatry evaluation and recommendations. Will continue home Risperdal and Prozac.     CV: Tachycardia to 110s-120. F/u EKG. PMHx of pulmonic stenosis: TTE (9/10) showing normal pulmonic valve and mild to moderate TR; Cardiology stating that patient is an acceptable surgical candidate from cardiac perspective. Appreciate Cardiology recommendations.  Pulm: Saturating well on RA. Increase incentive spirometry.  FEN: LR@75. F/u AM BMP. Electrolyte repletion PRN.   GI: Tolerating clear liquid diet with improving nausea. Standing Zofran q6h for nausea. Will consider advancing diet if nausea improves. Continue Protonix 40mg PO qD.  - s/p Endoscopy/Colonoscopy (9/11): No infiltration or masses seen on colonoscopy; will f/u biopsy of esophagitis, gastric polyps, and 3mm colonic polyp.   : s/p Darnell catheter, voiding spontaneously, /shift.  ID: No active issues. COVID negative (9/13)  Heme: Lovenox and Venodynes for DVT ppx  Dispo: Continue inpatient care    Олег Dill PGY3

## 2020-09-17 NOTE — PROGRESS NOTE ADULT - SUBJECTIVE AND OBJECTIVE BOX
INTERVAL HPI/OVERNIGHT EVENTS:    abdominal pain much improved this morning   without gi fxn yet; without any n/v on clears   feels more comfortable in the recliner chair     MEDICATIONS  (STANDING):  enoxaparin Injectable 40 milliGRAM(s) SubCutaneous daily  FLUoxetine 40 milliGRAM(s) Oral daily  lactated ringers. 1000 milliLiter(s) (75 mL/Hr) IV Continuous <Continuous>  ondansetron Injectable 4 milliGRAM(s) IV Push every 6 hours  ondansetron Injectable 4 milliGRAM(s) IV Push every 6 hours  pantoprazole    Tablet 40 milliGRAM(s) Oral before breakfast  potassium phosphate / sodium phosphate Tablet (K-PHOS No. 2) 1 Tablet(s) Oral once  risperiDONE   Tablet 1 milliGRAM(s) Oral daily  sodium chloride 0.9% lock flush 3 milliLiter(s) IV Push every 8 hours    MEDICATIONS  (PRN):  oxyCODONE    IR 5 milliGRAM(s) Oral every 3 hours PRN Moderate Pain (4 - 6)  oxyCODONE    IR 10 milliGRAM(s) Oral every 3 hours PRN Severe Pain (7 - 10)  petrolatum white Ointment 1 Application(s) Topical four times a day PRN irritation      Allergies    ibuprofen (Other)  NSAIDs (Unknown)    Intolerances        Review of Systems:    General:  No wt loss, fevers, chills, night sweats, fatigue   Eyes:  Good vision, no reported pain  ENT:  No sore throat, pain, runny nose, dysphagia  CV:  No pain, palpitations, hypo/hypertension  Resp:  No dyspnea, cough, tachypnea, wheezing  GI:  No pain, No nausea, No vomiting, No diarrhea, No constipation, No weight loss, No fever, No pruritis, No rectal bleeding, No melena, No dysphagia  :  No pain, bleeding, incontinence, nocturia  Muscle:  No pain, weakness  Neuro:  No weakness, tingling, memory problems  Psych:  No fatigue, insomnia, mood problems, depression  Endocrine:  No polyuria, polydypsia, cold/heat intolerance  Heme:  No petechiae, ecchymosis, easy bruisability  Skin:  No rash, tattoos, scars, edema      Vital Signs Last 24 Hrs  T(C): 36.9 (17 Sep 2020 08:58), Max: 37 (17 Sep 2020 05:13)  T(F): 98.5 (17 Sep 2020 08:58), Max: 98.6 (17 Sep 2020 05:13)  HR: 110 (17 Sep 2020 08:58) (102 - 120)  BP: 159/90 (17 Sep 2020 08:58) (133/65 - 164/83)  BP(mean): 81 (16 Sep 2020 23:32) (81 - 81)  RR: 18 (17 Sep 2020 08:58) (18 - 18)  SpO2: 94% (17 Sep 2020 08:58) (91% - 96%)    PHYSICAL EXAM:    Constitutional: NAD  HEENT: EOMI, throat clear  Neck: No LAD, supple  Respiratory: CTA and P  Cardiovascular: S1 and S2, RRR, no M  Gastrointestinal: BS+, soft, NT/ND, neg HSM,  Extremities: No peripheral edema, neg clubbing, cyanosis  Vascular: 2+ peripheral pulses  Neurological: A/O x 3, no focal deficits  Psychiatric: Normal mood, normal affect  Skin: No rashes      LABS:                        8.2    18.96 )-----------( 516      ( 17 Sep 2020 06:00 )             26.2     09-17    136  |  100  |  5<L>  ----------------------------<  127<H>  4.0   |  22  |  0.44<L>    Ca    8.1<L>      17 Sep 2020 06:00  Phos  2.1     09-17  Mg     2.0     09-17            RADIOLOGY & ADDITIONAL TESTS:

## 2020-09-17 NOTE — PROGRESS NOTE BEHAVIORAL HEALTH - SUMMARY
Patient is a 45 year old woman, single, domiciled with mother, employed part-time, with PPH of schizoaffective vs MDD with psychotic features (diagnosis of paranoid schizophrenia), 1 prior psychiatric hospitalization 24 years ago for a suicide attempt, stable on medications (risperidone and fluoxetine), in treatment with outpatient physician Dr. Watkins, no history of violence or legal issues, no history of substance abuse, PMH of pulmonic stenosis, currently admitted for possible ovarian malignancy, psychiatry consulted because patient has a history of psychiatric symptoms.     At this time, patient denies all suicidal ideation, intent, and plan and states that if she has suicidal ideation, she will talk to staff. She states that her suicidal ideation was in the context of discomfort and was not accompanied by intent or plan. Patient has been psychiatrically stable for a long time with good medication adherence and consistent outpatient treatment. Patient denies all current mood or psychotic symptoms. No S/H/V/I/I/P. Patient is a 45 year old woman, single, domiciled with mother, employed part-time, with PPH of schizoaffective vs MDD with psychotic features (diagnosis of paranoid schizophrenia), 1 prior psychiatric hospitalization 24 years ago for a suicide attempt, stable on medications (risperidone and fluoxetine), in treatment with outpatient physician Dr. Watkins, no history of violence or legal issues, no history of substance abuse, PMH of pulmonic stenosis, currently admitted for possible ovarian malignancy, psychiatry consulted because patient has a history of psychiatric symptoms.     At this time, patient denies all passive or active suicidal ideation, intent, and plan and states that if she has suicidal ideation, she will talk to staff. She states that her suicidal ideation was in the context of discomfort and was not accompanied by intent or plan. Patient has been psychiatrically stable for a long time with good medication adherence and consistent outpatient treatment. Patient denies all current mood or psychotic symptoms. No S/H/V/I/I/P.

## 2020-09-17 NOTE — CHART NOTE - NSCHARTNOTEFT_GEN_A_CORE
Patient evaluated at bedside this afternoon. Denies complaints. Reports that she has been out of bed, ambulating throughout the halls. Her pain is well controlled.     She is tolerating a clear liquid diet. Has not yet passed gas.     Denies current thoughts of harming herself or others. States that her mood is good.     Objective:   T(C): 36.8 (09-17-20 @ 16:30), Max: 37 (09-17-20 @ 05:13)  HR: 119 (09-17-20 @ 16:30) (110 - 120)  BP: 138/73 (09-17-20 @ 16:30) (138/73 - 164/83)  RR: 20 (09-17-20 @ 16:30) (18 - 20)  SpO2: 95% (09-17-20 @ 16:30) (93% - 95%)    Medications:   enoxaparin Injectable 40 milliGRAM(s) SubCutaneous daily  FLUoxetine 40 milliGRAM(s) Oral daily  lactated ringers. 1000 milliLiter(s) IV Continuous <Continuous>  ondansetron Injectable 4 milliGRAM(s) IV Push every 6 hours  ondansetron Injectable 4 milliGRAM(s) IV Push every 6 hours  oxyCODONE    IR 5 milliGRAM(s) Oral every 3 hours PRN  oxyCODONE    IR 10 milliGRAM(s) Oral every 3 hours PRN  pantoprazole    Tablet 40 milliGRAM(s) Oral before breakfast  petrolatum white Ointment 1 Application(s) Topical four times a day PRN  potassium phosphate / sodium phosphate Tablet (K-PHOS No. 2) 1 Tablet(s) Oral once  risperiDONE   Tablet 1 milliGRAM(s) Oral daily  sodium chloride 0.9% lock flush 3 milliLiter(s) IV Push every 8 hours    44y/o F now POD #2 s/p exploratory laparotomy, total abdominal hysterectomy, bilateral salpingo-oophorectomy and appendectomy with frozen pathology demonstrating an endometrioma. Expressed suicidal ideation overnight POD #1-2, however currently denying any suicidal ideation, and endorsing a good mood. Patient with stable vitals, tolerating a clear liquid diet and overall recovering well postoperatively.    - Continue 1:1 observation    - Continue to encourage ambulation and time out of bed.    - Continue CLD until flatus    - AM Labs ordered for 9/18    M.Soloff, PGY-2 Patient evaluated at bedside this afternoon. Denies complaints. Reports that she has been out of bed, ambulating throughout the halls. She denies lightheadedness, dizziness or shortness of breath. Her pain is well controlled.     She is tolerating a clear liquid diet. Has not yet passed gas.     Denies current thoughts of harming herself or others. States that her mood is good.     Objective:   T(C): 36.8 (09-17-20 @ 16:30), Max: 37 (09-17-20 @ 05:13)  HR: 119 (09-17-20 @ 16:30) (110 - 120)  BP: 138/73 (09-17-20 @ 16:30) (138/73 - 164/83)  RR: 20 (09-17-20 @ 16:30) (18 - 20)  SpO2: 95% (09-17-20 @ 16:30) (93% - 95%)    Medications:   enoxaparin Injectable 40 milliGRAM(s) SubCutaneous daily  FLUoxetine 40 milliGRAM(s) Oral daily  lactated ringers. 1000 milliLiter(s) IV Continuous <Continuous>  ondansetron Injectable 4 milliGRAM(s) IV Push every 6 hours  ondansetron Injectable 4 milliGRAM(s) IV Push every 6 hours  oxyCODONE    IR 5 milliGRAM(s) Oral every 3 hours PRN  oxyCODONE    IR 10 milliGRAM(s) Oral every 3 hours PRN  pantoprazole    Tablet 40 milliGRAM(s) Oral before breakfast  petrolatum white Ointment 1 Application(s) Topical four times a day PRN  potassium phosphate / sodium phosphate Tablet (K-PHOS No. 2) 1 Tablet(s) Oral once  risperiDONE   Tablet 1 milliGRAM(s) Oral daily  sodium chloride 0.9% lock flush 3 milliLiter(s) IV Push every 8 hours    44y/o F now POD #2 s/p exploratory laparotomy, total abdominal hysterectomy, bilateral salpingo-oophorectomy and appendectomy with frozen pathology demonstrating an endometrioma. Expressed suicidal ideation overnight POD #1-2, however currently denying any suicidal ideation, and endorsing a good mood. Patient with stable vitals, tolerating a clear liquid diet and overall recovering well postoperatively.    - Continue 1:1 observation    - Continue to encourage ambulation and time out of bed.    - Continue CLD until flatus    - AM Labs ordered for 9/18    M.[place], PGY-2

## 2020-09-17 NOTE — PROGRESS NOTE BEHAVIORAL HEALTH - NSBHFUPINTERVALHXFT_PSY_A_CORE
Chart reviewed. No psychiatric PRNs. Patient has been taking risperidone and fluoxetine. In short, psychiatry was called because patient made suicidal statements in the context of discomfort.    On interview, patient states that yesterday, she felt uncomfortable in the bed and stated, "I'd rather be dead than be in this bed." She states that she had no suicidal intent or plan. She states that she was feeling "very depressed" last night but her mood is now much better. Endorses poor sleep in the hospital but it was okay before the hospitalization. States that recent mood has been quite good prior to hospitalization. States that now she has a recliner, she no longer feels suicidal and no longer feels depressed. She is hopeful for the future and wants to go back home and see her family (niece lives next door, patient lives with mother). 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 Patient denies all current psychotic symptoms including AVH, IoR, paranoia. Patient denies current S/H/V/I/I/P.     Patient states she has diagnosis of paranoid schizophrenia and has had this since age 20. She states that she was placed on risperidone and fluoxetine and was on risperidone 4 mg at some point but this was lowered to 1 mg and she has had more energy since this switch was made. She has been following with psychiatrist Dr. Watkins and had an appointment two months ago. She had an appointment scheduled but it was during this hospitalization; she will reschedule after she leaves. Patient had one suicide attempt at age 21 in which she overdosed on risperidone and fluoxetine; she was psychiatrically hospitalized for one week. No other psychiatric hospitalizations and no other suicidal ideation, intent, plan, or behavior since that time. Chart reviewed. No psychiatric PRNs. Patient has been taking risperidone and fluoxetine. In short, psychiatry was called because patient made suicidal statements in the context of discomfort.    On interview, patient states that yesterday, she felt uncomfortable in the bed and stated, "I'd rather be dead than be in this bed." She states that she had no suicidal intent or plan. She states that she was feeling "very depressed" last night but her mood is now much better. Stated that she has been feeling more comfortable in the recliner. Endorses poor sleep in the hospital but it was okay before the hospitalization. States that recent mood has been quite good prior to hospitalization. States that now she has a recliner, she no longer feels suicidal and no longer feels depressed. She is hopeful for the future and wants to go back home and see her family (niece lives next door, patient lives with mother). 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 Patient denies all current psychotic symptoms including AVH, IoR, paranoia. Patient denies current S/H/V/I/I/P.     Patient states she has diagnosis of paranoid schizophrenia and has had this since age 20. She states that she was placed on risperidone and fluoxetine and was on risperidone 4 mg at some point but this was lowered to 1 mg and she has had more energy since this switch was made. She has been following with psychiatrist Dr. Watkins in  [place] and had an appointment two months ago. She had an appointment scheduled but it was during this hospitalization; she will reschedule after she leaves. Patient had one suicide attempt at age 21 in which she overdosed on risperidone and fluoxetine; she was psychiatrically hospitalized for one week. No other psychiatric hospitalizations and no other suicidal ideation, intent, plan, or behavior since that time.

## 2020-09-17 NOTE — PROGRESS NOTE ADULT - SUBJECTIVE AND OBJECTIVE BOX
Anesthesia Pain Management Service    SUBJECTIVE:    Therapy:	  [x ] IV PCA	   [ ] Epidural           [ ] s/p Spinal Opoid              [ ] Postpartum infusion	  [ ] Patient controlled regional anesthesia (PCRA)    [ ] prn Analgesics    OBJECTIVE:   [ x] No new signs     [ ] Other:    Side Effects:  [ x] None			[ ] Other:    Assessment of Catheter Site:		[ ] Intact		[ ] Other:    ASSESSMENT/PLAN  [ ] Continue current therapy    [x] Therapy changed to:    [ ] IV PCA       [ ] Epidural     [ x] prn Analgesics     Comments:

## 2020-09-17 NOTE — PROGRESS NOTE BEHAVIORAL HEALTH - NSBHCONSULTMEDS_PSY_A_CORE FT
continue risperidone 1 mg QD  continue fluoxetine 40 mg QD continue risperidone 1 mg QD if qtc <500  continue fluoxetine 40 mg QD

## 2020-09-18 LAB
ANION GAP SERPL CALC-SCNC: 13 MMO/L — SIGNIFICANT CHANGE UP (ref 7–14)
BASOPHILS # BLD AUTO: 0.01 K/UL — SIGNIFICANT CHANGE UP (ref 0–0.2)
BASOPHILS NFR BLD AUTO: 0.1 % — SIGNIFICANT CHANGE UP (ref 0–2)
BUN SERPL-MCNC: 3 MG/DL — LOW (ref 7–23)
CALCIUM SERPL-MCNC: 7.9 MG/DL — LOW (ref 8.4–10.5)
CHLORIDE SERPL-SCNC: 101 MMOL/L — SIGNIFICANT CHANGE UP (ref 98–107)
CO2 SERPL-SCNC: 24 MMOL/L — SIGNIFICANT CHANGE UP (ref 22–31)
CREAT SERPL-MCNC: 0.43 MG/DL — LOW (ref 0.5–1.3)
EOSINOPHIL # BLD AUTO: 0.18 K/UL — SIGNIFICANT CHANGE UP (ref 0–0.5)
EOSINOPHIL NFR BLD AUTO: 1.7 % — SIGNIFICANT CHANGE UP (ref 0–6)
GLUCOSE SERPL-MCNC: 108 MG/DL — HIGH (ref 70–99)
HCT VFR BLD CALC: 23.8 % — LOW (ref 34.5–45)
HGB BLD-MCNC: 7.3 G/DL — LOW (ref 11.5–15.5)
IMM GRANULOCYTES NFR BLD AUTO: 2.5 % — HIGH (ref 0–1.5)
LYMPHOCYTES # BLD AUTO: 1.14 K/UL — SIGNIFICANT CHANGE UP (ref 1–3.3)
LYMPHOCYTES # BLD AUTO: 10.8 % — LOW (ref 13–44)
MAGNESIUM SERPL-MCNC: 2 MG/DL — SIGNIFICANT CHANGE UP (ref 1.6–2.6)
MANUAL SMEAR VERIFICATION: SIGNIFICANT CHANGE UP
MCHC RBC-ENTMCNC: 28.6 PG — SIGNIFICANT CHANGE UP (ref 27–34)
MCHC RBC-ENTMCNC: 30.7 % — LOW (ref 32–36)
MCV RBC AUTO: 93.3 FL — SIGNIFICANT CHANGE UP (ref 80–100)
MONOCYTES # BLD AUTO: 0.79 K/UL — SIGNIFICANT CHANGE UP (ref 0–0.9)
MONOCYTES NFR BLD AUTO: 7.5 % — SIGNIFICANT CHANGE UP (ref 2–14)
NEUTROPHILS # BLD AUTO: 8.17 K/UL — HIGH (ref 1.8–7.4)
NEUTROPHILS NFR BLD AUTO: 77.4 % — HIGH (ref 43–77)
NRBC # FLD: 0 K/UL — SIGNIFICANT CHANGE UP (ref 0–0)
PHOSPHATE SERPL-MCNC: 2.7 MG/DL — SIGNIFICANT CHANGE UP (ref 2.5–4.5)
PLATELET # BLD AUTO: 430 K/UL — HIGH (ref 150–400)
PMV BLD: 10.6 FL — SIGNIFICANT CHANGE UP (ref 7–13)
POTASSIUM SERPL-MCNC: 3.7 MMOL/L — SIGNIFICANT CHANGE UP (ref 3.5–5.3)
POTASSIUM SERPL-SCNC: 3.7 MMOL/L — SIGNIFICANT CHANGE UP (ref 3.5–5.3)
RBC # BLD: 2.55 M/UL — LOW (ref 3.8–5.2)
RBC # FLD: 14.3 % — SIGNIFICANT CHANGE UP (ref 10.3–14.5)
SODIUM SERPL-SCNC: 138 MMOL/L — SIGNIFICANT CHANGE UP (ref 135–145)
SURGICAL PATHOLOGY STUDY: SIGNIFICANT CHANGE UP
WBC # BLD: 10.55 K/UL — HIGH (ref 3.8–10.5)
WBC # FLD AUTO: 10.55 K/UL — HIGH (ref 3.8–10.5)

## 2020-09-18 PROCEDURE — 99232 SBSQ HOSP IP/OBS MODERATE 35: CPT

## 2020-09-18 PROCEDURE — 99233 SBSQ HOSP IP/OBS HIGH 50: CPT

## 2020-09-18 RX ORDER — ACETAMINOPHEN 500 MG
650 TABLET ORAL EVERY 6 HOURS
Refills: 0 | Status: DISCONTINUED | OUTPATIENT
Start: 2020-09-18 | End: 2020-09-19

## 2020-09-18 RX ORDER — POTASSIUM CHLORIDE 20 MEQ
20 PACKET (EA) ORAL ONCE
Refills: 0 | Status: COMPLETED | OUTPATIENT
Start: 2020-09-18 | End: 2020-09-18

## 2020-09-18 RX ADMIN — ONDANSETRON 4 MILLIGRAM(S): 8 TABLET, FILM COATED ORAL at 11:43

## 2020-09-18 RX ADMIN — Medication 40 MILLIGRAM(S): at 11:43

## 2020-09-18 RX ADMIN — Medication 650 MILLIGRAM(S): at 11:43

## 2020-09-18 RX ADMIN — SODIUM CHLORIDE 3 MILLILITER(S): 9 INJECTION INTRAMUSCULAR; INTRAVENOUS; SUBCUTANEOUS at 11:44

## 2020-09-18 RX ADMIN — SODIUM CHLORIDE 3 MILLILITER(S): 9 INJECTION INTRAMUSCULAR; INTRAVENOUS; SUBCUTANEOUS at 21:00

## 2020-09-18 RX ADMIN — ONDANSETRON 4 MILLIGRAM(S): 8 TABLET, FILM COATED ORAL at 04:59

## 2020-09-18 RX ADMIN — Medication 650 MILLIGRAM(S): at 06:48

## 2020-09-18 RX ADMIN — Medication 650 MILLIGRAM(S): at 13:00

## 2020-09-18 RX ADMIN — ENOXAPARIN SODIUM 40 MILLIGRAM(S): 100 INJECTION SUBCUTANEOUS at 11:43

## 2020-09-18 RX ADMIN — PANTOPRAZOLE SODIUM 40 MILLIGRAM(S): 20 TABLET, DELAYED RELEASE ORAL at 04:59

## 2020-09-18 RX ADMIN — RISPERIDONE 1 MILLIGRAM(S): 4 TABLET ORAL at 11:43

## 2020-09-18 RX ADMIN — SODIUM CHLORIDE 3 MILLILITER(S): 9 INJECTION INTRAMUSCULAR; INTRAVENOUS; SUBCUTANEOUS at 04:59

## 2020-09-18 RX ADMIN — Medication 650 MILLIGRAM(S): at 17:20

## 2020-09-18 RX ADMIN — Medication 20 MILLIEQUIVALENT(S): at 11:43

## 2020-09-18 NOTE — PROGRESS NOTE ADULT - SUBJECTIVE AND OBJECTIVE BOX
GENERAL SURGERY DAILY PROGRESS NOTE:    Interval:  No acute events overnight.    Subjective:  Patient seen and examined. Reports pain is well controlled. Denies N/V. Tolerating clear diet. Ambulated. Passed flatus. Endorses improved mood sitting in recliner.     Vital Signs Last 24 Hrs  T(C): 36.9 (18 Sep 2020 05:08), Max: 37.7 (17 Sep 2020 20:31)  T(F): 98.4 (18 Sep 2020 05:08), Max: 99.9 (17 Sep 2020 20:31)  HR: 100 (18 Sep 2020 05:08) (100 - 119)  BP: 137/72 (18 Sep 2020 05:08) (137/72 - 159/90)  BP(mean): --  RR: 19 (18 Sep 2020 05:08) (18 - 20)  SpO2: 96% (18 Sep 2020 05:08) (94% - 96%)    Exam:  Gen: NAD, resting in bed, alert and responding appropriately  Resp: Airway patent, non-labored respirations  Abd: Soft, ND, NT, no rebound or guarding. Incisions c/d/i  Ext: No edema, WWP  Neuro: AAOx3, no focal deficits    I&O's Detail    17 Sep 2020 07:01  -  18 Sep 2020 07:00  --------------------------------------------------------  IN:    Lactated Ringers: 1350 mL    Oral Fluid: 240 mL  Total IN: 1590 mL    OUT:    Voided (mL): 3900 mL  Total OUT: 3900 mL    Total NET: -2310 mL          Daily     Daily     MEDICATIONS  (STANDING):  acetaminophen   Tablet .. 650 milliGRAM(s) Oral every 6 hours  enoxaparin Injectable 40 milliGRAM(s) SubCutaneous daily  FLUoxetine 40 milliGRAM(s) Oral daily  ondansetron Injectable 4 milliGRAM(s) IV Push every 6 hours  ondansetron Injectable 4 milliGRAM(s) IV Push every 6 hours  pantoprazole    Tablet 40 milliGRAM(s) Oral before breakfast  potassium chloride    Tablet ER 20 milliEquivalent(s) Oral once  risperiDONE   Tablet 1 milliGRAM(s) Oral daily  sodium chloride 0.9% lock flush 3 milliLiter(s) IV Push every 8 hours    MEDICATIONS  (PRN):  oxyCODONE    IR 5 milliGRAM(s) Oral every 3 hours PRN Moderate Pain (4 - 6)  oxyCODONE    IR 10 milliGRAM(s) Oral every 3 hours PRN Severe Pain (7 - 10)  petrolatum white Ointment 1 Application(s) Topical four times a day PRN irritation      LABS:                        7.3    10.55 )-----------( 430      ( 18 Sep 2020 06:30 )             23.8     09-18    138  |  101  |  3<L>  ----------------------------<  108<H>  3.7   |  24  |  0.43<L>    Ca    7.9<L>      18 Sep 2020 06:30  Phos  2.7     09-18  Mg     2.0     09-18

## 2020-09-18 NOTE — PROGRESS NOTE BEHAVIORAL HEALTH - NSBHCONSULTMEDSEVERE_PSY_A_CORE FT
patient likely will not need, but can offer risperidone 0.5 mg PO Q8H PRN for severe agitation if qtc <500

## 2020-09-18 NOTE — PROGRESS NOTE BEHAVIORAL HEALTH - NSBHCHARTREVIEWLAB_PSY_A_CORE FT
LABS:  cret                        7.3    10.55 )-----------( 430      ( 18 Sep 2020 06:30 )             23.8     09-18    138  |  101  |  3<L>  ----------------------------<  108<H>  3.7   |  24  |  0.43<L>    Ca    7.9<L>      18 Sep 2020 06:30  Phos  2.7     09-18  Mg     2.0     09-18

## 2020-09-18 NOTE — PROGRESS NOTE ADULT - SUBJECTIVE AND OBJECTIVE BOX
INTERVAL HPI/OVERNIGHT EVENTS:    in better spirits this morning   (+) flatus, denied bm   minimal abd pain  tolerating po     MEDICATIONS  (STANDING):  acetaminophen   Tablet .. 650 milliGRAM(s) Oral every 6 hours  enoxaparin Injectable 40 milliGRAM(s) SubCutaneous daily  FLUoxetine 40 milliGRAM(s) Oral daily  ondansetron Injectable 4 milliGRAM(s) IV Push every 6 hours  ondansetron Injectable 4 milliGRAM(s) IV Push every 6 hours  pantoprazole    Tablet 40 milliGRAM(s) Oral before breakfast  potassium chloride    Tablet ER 20 milliEquivalent(s) Oral once  risperiDONE   Tablet 1 milliGRAM(s) Oral daily  sodium chloride 0.9% lock flush 3 milliLiter(s) IV Push every 8 hours    MEDICATIONS  (PRN):  oxyCODONE    IR 5 milliGRAM(s) Oral every 3 hours PRN Moderate Pain (4 - 6)  oxyCODONE    IR 10 milliGRAM(s) Oral every 3 hours PRN Severe Pain (7 - 10)  petrolatum white Ointment 1 Application(s) Topical four times a day PRN irritation      Allergies    ibuprofen (Other)  NSAIDs (Unknown)    Intolerances        Review of Systems:    General:  No wt loss, fevers, chills, night sweats, fatigue   Eyes:  Good vision, no reported pain  ENT:  No sore throat, pain, runny nose, dysphagia  CV:  No pain, palpitations, hypo/hypertension  Resp:  No dyspnea, cough, tachypnea, wheezing  GI:  No pain, No nausea, No vomiting, No diarrhea, No constipation, No weight loss, No fever, No pruritis, No rectal bleeding, No melena, No dysphagia  :  No pain, bleeding, incontinence, nocturia  Muscle:  No pain, weakness  Neuro:  No weakness, tingling, memory problems  Psych:  No fatigue, insomnia, mood problems, depression  Endocrine:  No polyuria, polydypsia, cold/heat intolerance  Heme:  No petechiae, ecchymosis, easy bruisability  Skin:  No rash, tattoos, scars, edema      Vital Signs Last 24 Hrs  T(C): 36.7 (18 Sep 2020 09:05), Max: 37.7 (17 Sep 2020 20:31)  T(F): 98.1 (18 Sep 2020 09:05), Max: 99.9 (17 Sep 2020 20:31)  HR: 103 (18 Sep 2020 09:05) (100 - 119)  BP: 127/72 (18 Sep 2020 09:05) (127/72 - 155/79)  BP(mean): --  RR: 22 (18 Sep 2020 09:05) (18 - 22)  SpO2: 96% (18 Sep 2020 09:05) (94% - 96%)    PHYSICAL EXAM:    Constitutional: NAD  HEENT: EOMI, throat clear  Neck: No LAD, supple  Respiratory: CTA and P  Cardiovascular: S1 and S2, RRR, no M  Gastrointestinal: BS+, soft, NT/ND, neg HSM,  Extremities: No peripheral edema, neg clubbing, cyanosis  Vascular: 2+ peripheral pulses  Neurological: A/O x 3, no focal deficits  Psychiatric: Normal mood, normal affect  Skin: No rashes      LABS:                        7.3    10.55 )-----------( 430      ( 18 Sep 2020 06:30 )             23.8     09-18    138  |  101  |  3<L>  ----------------------------<  108<H>  3.7   |  24  |  0.43<L>    Ca    7.9<L>      18 Sep 2020 06:30  Phos  2.7     09-18  Mg     2.0     09-18            RADIOLOGY & ADDITIONAL TESTS:

## 2020-09-18 NOTE — PROGRESS NOTE ADULT - PROBLEM SELECTOR PLAN 1
s/p ex-lap w/extensive endometriosis w/endometrioma, ILEANA, BSO, appendectomy  pain control prn/zofran prn   monitor gi fxn  oob/ambulate as tolerated   cont post-surgical care per GYN/Onc and Surgery teams; care appreciated  diet as tolerated

## 2020-09-18 NOTE — PROGRESS NOTE BEHAVIORAL HEALTH - RISK ASSESSMENT
Risk factors include prior psychiatric hospitalization, prior SA, recent endorsed passive SI, acute medical illness. Protective factors include future-oriented, help-seeking, engaged in treatment, denies current suicidal ideation, intent, or plan, psychiatric stability spanning over twenty years, good medication adherence, good social supports, stated reasons to live, fear of pain and death.    Low acute risk of violence or aggression. Patient has no known history of violence or aggression and denies violent, aggressive, or homicidal ideation, intent, or plan.    in my opinion, pt. is not at acute risk of harm to self or others at this time and does not need an inpatient admission. Risk factors include prior psychiatric hospitalization, prior SA, recent endorsed passive SI in frustration, acute medical illness. Protective factors include future-oriented, help-seeking, engaged in treatment, denies current suicidal ideation, intent, or plan, psychiatric stability spanning over twenty years, good medication adherence, good social supports, stated reasons to live, fear of pain and death.    Low acute risk of violence or aggression. Patient has no known history of violence or aggression and denies violent, aggressive, or homicidal ideation, intent, or plan.    in my opinion, pt. is not at acute risk of harm to self or others at this time and does not need an inpatient admission. as she does not meet the criteria for involuntary admission at this time.

## 2020-09-18 NOTE — PROGRESS NOTE BEHAVIORAL HEALTH - NSBHCONSULTMEDS_PSY_A_CORE FT
risperidone 1 mg QD if qtc <500 and fluoxetine 40 mg QD As above recommend to continue risperidone 1 mg po daily,  if qtc <500 and fluoxetine 40 mg po daily. Monitor EKG for qtc.

## 2020-09-18 NOTE — PROGRESS NOTE ADULT - PROBLEM SELECTOR PLAN 1
Neuro: Pain well controlled. Tylenol/Motrin and Oxy PRN for pain.   Psych: PMHx of paranoid schizophrenia, anxiety and depression and suicide attempts. Patient on one-to-one observation due to passive suicidal ideation overnight POD #1-2, denying further SI. Evaluated by psychiatry POD#2, appreciate recommendation for Risperidone 0.5mg PO q8h PRN for agitation. Will continue home Risperdal and Prozac.     CV: Tachycardia, -116 overnight EKG sinus tachycardia (9/17). PMHx of pulmonic stenosis: TTE (9/10) showing normal pulmonic valve and mild to moderate TR. Appreciate Cardiology input.  Pulm: Saturating well on RA. Increase incentive spirometry.  FEN: LR@75. F/u AM BMP. Electrolyte repletion PRN.  GI: Tolerating clear liquid diet with improving nausea. Standing Zofran q6h for nausea. Will consider advancing diet if patient passes flatus. Continue Protonix 40mg PO qD.  - s/p Endoscopy/Colonoscopy (9/11): No infiltration or masses seen on colonoscopy; will f/u biopsy of esophagitis, gastric polyps, and 3mm colonic polyp.   : s/p Darnell catheter, voiding spontaneously, UOP adequate.  ID: No active issues. COVID negative (9/13)  Heme: Continue Lovenox and Venodynes for DVT ppx  Dispo: Continue inpatient care    Patient seen and evaluated with GYN Oncology team.   Mauricio, PGY-2 Neuro: Pain well controlled. Tylenol with Oxy PRN for pain.   Psych: PMHx of paranoid schizophrenia, anxiety and depression and suicide attempts. Patient on one-to-one observation due to passive suicidal ideation overnight POD #1-2, denying further SI. Evaluated by psychiatry POD#2, appreciate recommendation for Risperidone 0.5mg PO q8h PRN for agitation. Will continue home Risperdal and Prozac.     CV: Tachycardia, -116 overnight EKG sinus tachycardia (9/17). PMHx of pulmonic stenosis: TTE (9/10) showing normal pulmonic valve and mild to moderate TR. Appreciate Cardiology input.  Pulm: Saturating well on RA. Increase incentive spirometry.  FEN: LR@75. F/u AM BMP. Electrolyte repletion PRN.  GI: Tolerating clear liquid diet with improving nausea. Standing Zofran q6h for nausea. Will consider advancing diet if patient passes flatus. Continue Protonix 40mg PO qD.  - s/p Endoscopy/Colonoscopy (9/11): No infiltration or masses seen on colonoscopy; will f/u biopsy of esophagitis, gastric polyps, and 3mm colonic polyp.   : s/p Darnell catheter, voiding spontaneously, UOP adequate.  ID: No active issues. COVID negative (9/13)  Heme: Continue Lovenox and Venodynes for DVT ppx  Dispo: Continue inpatient care    Patient seen and evaluated with GYN Oncology team.   Mauricio, PGY-2 Neuro: Pain well controlled. Tylenol with Oxy PRN for pain.   Psych: PMHx of paranoid schizophrenia, anxiety and depression and suicide attempts. Patient on one-to-one observation due to passive suicidal ideation overnight POD #1-2, denying further SI. Evaluated by psychiatry POD#2, appreciate recommendation for Risperidone 0.5mg PO q8h PRN for agitation. Will continue home Risperdal and Prozac.     CV: Tachycardia, -116 overnight EKG sinus tachycardia (9/17). PMHx of pulmonic stenosis: TTE (9/10) showing normal pulmonic valve and mild to moderate TR. Appreciate Cardiology input.  Pulm: Saturating well on RA. Increase incentive spirometry.  FEN: LR@75. F/u AM BMP. Electrolyte repletion PRN.  GI: Tolerating clear liquid diet without nausea, passing flatus. Will advance to regular diet. Zofran q6h for nausea. Continue Protonix 40mg PO qD.  - s/p Endoscopy/Colonoscopy (9/11): No infiltration or masses seen on colonoscopy; will f/u biopsy of esophagitis, gastric polyps, and 3mm colonic polyp.   : s/p Darnell catheter, voiding spontaneously, UOP adequate.  ID: No active issues. COVID negative (9/13)  Heme: Continue Lovenox and Venodynes for DVT ppx  Dispo: Continue inpatient care, will advance diet today.     Patient seen and evaluated with GYN Oncology team.   Mauricio, PGY-2

## 2020-09-18 NOTE — PROGRESS NOTE BEHAVIORAL HEALTH - CASE SUMMARY
Psychiatry was reconsulted by primary team BURT Flores for psychiatric clearance, Patient seen and evaluated with Medical student Riley, I agree with above assessment and plan, pt. AAOX3, calm, cooperative, linear and coherent, denies feeling depressed or anxious, denies acute psychotic or manic sxs, adamantly denies passive or active SI/I/P, denies HI. Pt. again stated that she made suicidal statement in the context of discomfort and frustration, She remains future oriented, treatment seeking and hopeful, looking forward to go home and see her family members. Patient also willing to follow up with her outpt. Psychiatrist Dr. Watkins, recommend to continue risperidone 1 mg po daily,  if qtc <500 and fluoxetine 40 mg po daily. Monitor EKG for qtc.
Patient seen and evaluated with Dr. Lozada, I agree with above assessment and plan, pt. AAOX3, calm, polite, cooperative, thought process is linear and coherent. Patient denies acute psychotic or manic sxs, adamantly denies passive or active SI/I/P, denies HI. Patient is hopeful, treatment seeking and future oriented. Pt. stated that she made suicidal statement in the context of discomfort and frustration, she stated that now she a recliner, she is feeling more comfortable and her mood is better. Patient wants to continue follow up with her outpt. psychiatrist Dr. Watkins. Recommend meds. as written above, monitor EKG for qtc.

## 2020-09-18 NOTE — PROGRESS NOTE ADULT - SUBJECTIVE AND OBJECTIVE BOX
Subjective: Patient seen and examined. No new events except as noted.   resting comfortably     REVIEW OF SYSTEMS:    CONSTITUTIONAL: + weakness, fevers or chills  EYES/ENT: No visual changes;  No vertigo or throat pain   NECK: No pain or stiffness  RESPIRATORY: No cough, wheezing, hemoptysis; No shortness of breath  CARDIOVASCULAR: No chest pain or palpitations  GASTROINTESTINAL: No abdominal or epigastric pain. No nausea, vomiting, or hematemesis; No diarrhea or constipation. No melena or hematochezia.  GENITOURINARY: No dysuria, frequency or hematuria  NEUROLOGICAL: No numbness or weakness  SKIN: No itching, burning, rashes, or lesions   All other review of systems is negative unless indicated above.    MEDICATIONS:  MEDICATIONS  (STANDING):  acetaminophen   Tablet .. 650 milliGRAM(s) Oral every 6 hours  enoxaparin Injectable 40 milliGRAM(s) SubCutaneous daily  FLUoxetine 40 milliGRAM(s) Oral daily  ondansetron Injectable 4 milliGRAM(s) IV Push every 6 hours  ondansetron Injectable 4 milliGRAM(s) IV Push every 6 hours  pantoprazole    Tablet 40 milliGRAM(s) Oral before breakfast  potassium chloride    Tablet ER 20 milliEquivalent(s) Oral once  risperiDONE   Tablet 1 milliGRAM(s) Oral daily  sodium chloride 0.9% lock flush 3 milliLiter(s) IV Push every 8 hours      PHYSICAL EXAM:  T(C): 36.7 (09-18-20 @ 09:05), Max: 37.7 (09-17-20 @ 20:31)  HR: 103 (09-18-20 @ 09:05) (100 - 119)  BP: 127/72 (09-18-20 @ 09:05) (127/72 - 155/79)  RR: 22 (09-18-20 @ 09:05) (18 - 22)  SpO2: 96% (09-18-20 @ 09:05) (94% - 96%)  Wt(kg): --  I&O's Summary    17 Sep 2020 07:01  -  18 Sep 2020 07:00  --------------------------------------------------------  IN: 1590 mL / OUT: 3900 mL / NET: -2310 mL    18 Sep 2020 07:01  -  18 Sep 2020 10:45  --------------------------------------------------------  IN: 0 mL / OUT: 500 mL / NET: -500 mL          Appearance: Normal	  HEENT:   Normal oral mucosa, PERRL, EOMI	  Lymphatic: No lymphadenopathy , no edema  Cardiovascular: Normal S1 S2, No JVD, No murmurs , Peripheral pulses palpable 2+ bilaterally  Respiratory: Lungs clear to auscultation, normal effort 	  Gastrointestinal:  Soft, Non-tender, + BS	  Skin: No rashes, No ecchymoses, No cyanosis, warm to touch  Musculoskeletal: Normal range of motion, normal strength  Psychiatry:  Mood & affect appropriate  Ext: No edema      LABS:    CARDIAC MARKERS:                                7.3    10.55 )-----------( 430      ( 18 Sep 2020 06:30 )             23.8     09-18    138  |  101  |  3<L>  ----------------------------<  108<H>  3.7   |  24  |  0.43<L>    Ca    7.9<L>      18 Sep 2020 06:30  Phos  2.7     09-18  Mg     2.0     09-18      proBNP:   Lipid Profile:   HgA1c:   TSH:             TELEMETRY: 	    ECG:  	  RADIOLOGY:   DIAGNOSTIC TESTING:  [ ] Echocardiogram:  [ ]  Catheterization:  [ ] Stress Test:    OTHER:

## 2020-09-18 NOTE — PROGRESS NOTE ADULT - ASSESSMENT
44 y/o now POD#3 s/p Exploratory Laparotomy, Total Abdominal Hysterectomy, Bilateral Salpingo-oophorectomy and Appendectomy on 9/15/2020, where she was found to have extensive endometriosis (frozen pathology=endometrioma). Patient with passive suicidal ideation overnight on POD#1-2, currently on one to one observation. Patient denies any active suicidal ideation at this time. She remains with stable vitals and reassuring physical exam, and is recovering well postoperatively.

## 2020-09-18 NOTE — PROGRESS NOTE BEHAVIORAL HEALTH - NSBHCHARTREVIEWVS_PSY_A_CORE FT
Vital Signs (24 Hrs):  T(C): 36.7 (09-18-20 @ 09:05), Max: 37.7 (09-17-20 @ 20:31)  HR: 103 (09-18-20 @ 09:05) (100 - 119)  BP: 127/72 (09-18-20 @ 09:05) (127/72 - 155/79)  RR: 22 (09-18-20 @ 09:05) (18 - 22)  SpO2: 96% (09-18-20 @ 09:05) (94% - 96%)  Wt(kg): --  Daily     Daily     I&O's Summary    17 Sep 2020 07:01  -  18 Sep 2020 07:00  --------------------------------------------------------  IN: 1590 mL / OUT: 3900 mL / NET: -2310 mL    18 Sep 2020 07:01  -  18 Sep 2020 11:15  --------------------------------------------------------  IN: 0 mL / OUT: 500 mL / NET: -500 mL

## 2020-09-18 NOTE — PROGRESS NOTE BEHAVIORAL HEALTH - NSBHCONSULTFOLLOWAFTERCARE_PSY_A_CORE FT
patient will follow-up with outpatient psychiatrist to manage fluoxetine and risperidone Please follow up with your outpatient psychiatrist Dr. Watkins at [facility]. Pt. stated that she will make an appointment.

## 2020-09-18 NOTE — PROGRESS NOTE ADULT - ASSESSMENT
45 year old woman s/p appendectomy    - diet as tolerated  - psyc consult   - care per gyn-oncology  - will follow

## 2020-09-18 NOTE — PROGRESS NOTE ADULT - ASSESSMENT
46y/o F hx painful enlarging pelvic mass now POD #3 s/p exploratory laparotomy demonstrating extensive endometriosis with endometrioma, ILEANA, BSO, appendectomy for incidentally found dilated tortuous appendix. Persistently tachycardic overnight, but remains afebrile, no signs of bleeding on exam or labs, no hypoxia/dyspnea.    - Pain control  - Monitor GI function  - Advance diet as tolerated  - Encourage ambulation  - Appreciate Cardiology recs  - Appreciate Psychiatry input  - Care per primary team  - DVT PPx    A Team Surgery [id number]

## 2020-09-18 NOTE — PROGRESS NOTE BEHAVIORAL HEALTH - SUMMARY
Patient is a 45 year old woman, single, domiciled with mother, employed part-time, with PPH of schizoaffective vs MDD with psychotic features (diagnosis of paranoid schizophrenia), 1 prior psychiatric hospitalization 24 years ago for a suicide attempt, stable on medications (risperidone and fluoxetine), in treatment with outpatient physician Dr. Watkins, no history of violence or legal issues, no history of substance abuse, PMH of pulmonic stenosis, currently admitted for possible ovarian malignancy, psychiatry consulted because patient has a history of psychiatric symptoms.     8/17: At this time, patient denies all passive or active suicidal ideation, intent, and plan and states that if she has suicidal ideation, she will talk to staff. She states that her suicidal ideation was in the context of discomfort and was not accompanied by intent or plan. Patient has been psychiatrically stable for a long time with good medication adherence and consistent outpatient treatment. Patient denies all current mood or psychotic symptoms. No S/H/V/I/I/P.    8/18: Patient AAOX3, calm, and cooperative.  States her previous SI was solely because of discomfort in the bed, and she no longer endorses it. Denies depressed/elevated mood, HI, AH, and VH. Patient is future-oriented    Plan:  1. Patient does not need 1:1 supervision because she is at low risk of acute harm to self or others.  2. Patient will f/u with outpatient psychiatrist, to manage her fluoxetine and risperidone  3. c/w risperidone 1 mg QD if qtc <500 and fluoxetine 40 mg QD  4. Patient is a 45 year old woman, single, domiciled with mother, employed part-time, with PPH of schizoaffective vs MDD with psychotic features (diagnosis of paranoid schizophrenia), 1 prior psychiatric hospitalization 24 years ago for a suicide attempt, stable on medications (risperidone and fluoxetine), in treatment with outpatient physician Dr. Watkins, no history of violence or legal issues, no history of substance abuse, PMH of pulmonic stenosis, currently admitted for possible ovarian malignancy, psychiatry consulted because patient has a history of psychiatric symptoms.     9/17: At this time, patient denies all passive or active suicidal ideation, intent, and plan and states that if she has suicidal ideation, she will talk to staff. She states that her suicidal ideation was in the context of discomfort and was not accompanied by intent or plan. Patient has been psychiatrically stable for a long time with good medication adherence and consistent outpatient treatment. Patient denies all current mood or psychotic symptoms. No S/H/V/I/I/P.    9/18: Patient AAOX3, calm, and cooperative.  States her previous SI was solely because of discomfort in the bed, and she no longer endorses it. Denies depressed/elevated mood, HI, AH, and VH. Patient is future-oriented    Plan:  1. Patient does not need 1:1 supervision from psychiatric perspective because she is at low risk of acute harm to self or others. She is future oriented  2. Patient will f/u with outpatient psychiatrist, to manage her fluoxetine and risperidone  3. c Patient is a 45 year old woman, single, domiciled with mother, employed part-time, with PPH of schizoaffective vs MDD with psychotic features (diagnosis of paranoid schizophrenia), 1 prior psychiatric hospitalization 24 years ago for a suicide attempt, stable on medications (risperidone and fluoxetine), in treatment with outpatient physician Dr. Watkins, no history of violence or legal issues, no history of substance abuse, PMH of pulmonic stenosis, currently admitted for possible ovarian malignancy, psychiatry consulted because patient has a history of psychiatric symptoms.     9/17: At this time, patient denies all passive or active suicidal ideation, intent, and plan and states that if she has suicidal ideation, she will talk to staff. She states that her suicidal ideation was in the context of discomfort and was not accompanied by intent or plan. Patient has been psychiatrically stable for a long time with good medication adherence and consistent outpatient treatment. Patient denies all current mood or psychotic symptoms. No S/H/V/I/I/P.    9/18: Patient AAOX3, calm, and cooperative.  States her previous SI was solely because of discomfort in the bed, and she no longer endorses it. Denies depressed/elevated mood, HI, AH, and VH. Patient is future-oriented    Plan:  1. Patient does not need 1:1 supervision from psychiatric perspective because she is at low risk of acute harm to self or others. She is future oriented  2. Patient will f/u with outpatient psychiatrist, to manage her fluoxetine and risperidone

## 2020-09-18 NOTE — PROGRESS NOTE BEHAVIORAL HEALTH - NSBHFUPINTERVALHXFT_PSY_A_CORE
No PRNs overnight. Patient AAOX3, calm, and cooperative. She feels better after switching from original hospital bed to recliner. States her previous SI was solely because of discomfort in the bed, and she no longer endorses it. Denies depressed/elevated mood, HI, AH, and VH. Patient is looking forward to going home and being able to sleep on her own bed, brush with her own toothbrush, and spend time with her family. No PRNs overnight. Patient AAOX3, calm, and cooperative. She feels better after switching from original hospital bed to recliner. States her previous passive SI was solely because of discomfort in the bed, and she no longer endorses it. She adamantly denies passive or active SI/I/P. Denies depressed/elevated mood, HI, AH, and VH. Patient is looking forward to going home and being able to sleep on her own bed, brush with her own toothbrush, and spend time with her family. Patient stated that she sees Dr. Watkins regularly, and wants to make an appointment by herself.

## 2020-09-18 NOTE — PROGRESS NOTE ADULT - SUBJECTIVE AND OBJECTIVE BOX
Patient is a 45y old  Female who presents with a chief complaint of concern for ovarian malignancy (18 Sep 2020 10:45)      SUBJECTIVE / OVERNIGHT EVENTS: No overnight event. No complaint this morning. Denies anxiety or SI.    MEDICATIONS  (STANDING):  acetaminophen   Tablet .. 650 milliGRAM(s) Oral every 6 hours  enoxaparin Injectable 40 milliGRAM(s) SubCutaneous daily  FLUoxetine 40 milliGRAM(s) Oral daily  ondansetron Injectable 4 milliGRAM(s) IV Push every 6 hours  ondansetron Injectable 4 milliGRAM(s) IV Push every 6 hours  pantoprazole    Tablet 40 milliGRAM(s) Oral before breakfast  potassium chloride    Tablet ER 20 milliEquivalent(s) Oral once  risperiDONE   Tablet 1 milliGRAM(s) Oral daily  sodium chloride 0.9% lock flush 3 milliLiter(s) IV Push every 8 hours    MEDICATIONS  (PRN):  oxyCODONE    IR 5 milliGRAM(s) Oral every 3 hours PRN Moderate Pain (4 - 6)  oxyCODONE    IR 10 milliGRAM(s) Oral every 3 hours PRN Severe Pain (7 - 10)  petrolatum white Ointment 1 Application(s) Topical four times a day PRN irritation      CAPILLARY BLOOD GLUCOSE        I&O's Summary    17 Sep 2020 07:01  -  18 Sep 2020 07:00  --------------------------------------------------------  IN: 1590 mL / OUT: 3900 mL / NET: -2310 mL    18 Sep 2020 07:01  -  18 Sep 2020 10:58  --------------------------------------------------------  IN: 0 mL / OUT: 500 mL / NET: -500 mL        PHYSICAL EXAM:  Vital Signs Last 24 Hrs  T(C): 36.7 (18 Sep 2020 09:05), Max: 37.7 (17 Sep 2020 20:31)  T(F): 98.1 (18 Sep 2020 09:05), Max: 99.9 (17 Sep 2020 20:31)  HR: 103 (18 Sep 2020 09:05) (100 - 119)  BP: 127/72 (18 Sep 2020 09:05) (127/72 - 155/79)  BP(mean): --  RR: 22 (18 Sep 2020 09:05) (18 - 22)  SpO2: 96% (18 Sep 2020 09:05) (94% - 96%)  CONSTITUTIONAL: NAD, well-developed, well-groomed  EYES: PERRLA; conjunctiva and sclera clear  ENMT: Moist oral mucosa, no pharyngeal injection or exudates; normal dentition  NECK: Supple, no palpable masses; no thyromegaly  RESPIRATORY: Normal respiratory effort; lungs are clear to auscultation bilaterally  CARDIOVASCULAR: Regular rate and rhythm, normal S1 and S2, no murmur/rub/gallop; No lower extremity edema; Peripheral pulses are 2+ bilaterally  ABDOMEN: Nontender to palpation, normoactive bowel sounds, no rebound/guarding; No hepatosplenomegaly  PSYCH: A+O to person, place, and time; affect limited  LABS:                        7.3    10.55 )-----------( 430      ( 18 Sep 2020 06:30 )             23.8     09-18    138  |  101  |  3<L>  ----------------------------<  108<H>  3.7   |  24  |  0.43<L>    Ca    7.9<L>      18 Sep 2020 06:30  Phos  2.7     09-18  Mg     2.0     09-18                  RADIOLOGY & ADDITIONAL TESTS:  Results Reviewed:   Imaging Personally Reviewed:  Electrocardiogram Personally Reviewed:    COORDINATION OF CARE:  Care Discussed with Consultants/Other Providers [Y/N]:  Prior or Outpatient Records Reviewed [Y/N]:

## 2020-09-18 NOTE — PROGRESS NOTE BEHAVIORAL HEALTH - NSBHFUPINTERVALCCFT_PSY_A_CORE
"I'm looking forward to sleeping on my own bed" "I'm looking forward to sleeping on my own bed, see my family, my niece, my brother and my mother"

## 2020-09-18 NOTE — PROGRESS NOTE ADULT - SUBJECTIVE AND OBJECTIVE BOX
INTERVAL HPI/OVERNIGHT EVENTS: Pt seen and examined. States she is feeling better. Tolerating diet, denies nausea or emesis. Pain managed. +GIF    MEDICATIONS  (STANDING):  acetaminophen   Tablet .. 650 milliGRAM(s) Oral every 6 hours  enoxaparin Injectable 40 milliGRAM(s) SubCutaneous daily  FLUoxetine 40 milliGRAM(s) Oral daily  ondansetron Injectable 4 milliGRAM(s) IV Push every 6 hours  ondansetron Injectable 4 milliGRAM(s) IV Push every 6 hours  pantoprazole    Tablet 40 milliGRAM(s) Oral before breakfast  potassium chloride    Tablet ER 20 milliEquivalent(s) Oral once  risperiDONE   Tablet 1 milliGRAM(s) Oral daily  sodium chloride 0.9% lock flush 3 milliLiter(s) IV Push every 8 hours    MEDICATIONS  (PRN):  oxyCODONE    IR 5 milliGRAM(s) Oral every 3 hours PRN Moderate Pain (4 - 6)  oxyCODONE    IR 10 milliGRAM(s) Oral every 3 hours PRN Severe Pain (7 - 10)  petrolatum white Ointment 1 Application(s) Topical four times a day PRN irritation    Vital Signs Last 24 Hrs  T(C): 36.7 (18 Sep 2020 09:05), Max: 37.7 (17 Sep 2020 20:31)  T(F): 98.1 (18 Sep 2020 09:05), Max: 99.9 (17 Sep 2020 20:31)  HR: 103 (18 Sep 2020 09:05) (100 - 119)  BP: 127/72 (18 Sep 2020 09:05) (127/72 - 155/79)  BP(mean): --  RR: 22 (18 Sep 2020 09:05) (18 - 22)  SpO2: 96% (18 Sep 2020 09:05) (94% - 96%)    PHYSICAL EXAM:  Constitutional: NAD  Gastrointestinal: abd soft, nondistended. Abd binder in place              I&O's Detail    17 Sep 2020 07:01  -  18 Sep 2020 07:00  --------------------------------------------------------  IN:    Lactated Ringers: 1350 mL    Oral Fluid: 240 mL  Total IN: 1590 mL    OUT:    Voided (mL): 3900 mL  Total OUT: 3900 mL    Total NET: -2310 mL      18 Sep 2020 07:01  -  18 Sep 2020 10:12  --------------------------------------------------------  IN:  Total IN: 0 mL    OUT:    Voided (mL): 500 mL  Total OUT: 500 mL    Total NET: -500 mL          LABS:                        7.3    10.55 )-----------( 430      ( 18 Sep 2020 06:30 )             23.8     09-18    138  |  101  |  3<L>  ----------------------------<  108<H>  3.7   |  24  |  0.43<L>    Ca    7.9<L>      18 Sep 2020 06:30  Phos  2.7     09-18  Mg     2.0     09-18            RADIOLOGY & ADDITIONAL STUDIES:

## 2020-09-18 NOTE — PROGRESS NOTE ADULT - SUBJECTIVE AND OBJECTIVE BOX
Gyn ONC Progress Note POD #3    Patient seen and examined at bedside. No acute events overnight. She reports that her pain is well controlled. Patient ambulating through the halls without lightheadedness or dizziness. Has not yet passed flatus. Tolerating a clear liquid diet without nausea or emesis. She endorses a good mood, and denies suicidal thoughts or thoughts of harming herself or others.      Patient denies fever, chills, chest pain, SOB.       Objective:  T(F): 98.4 (09-18-20 @ 05:08), Max: 99.9 (09-17-20 @ 20:31)  HR: 100 (09-18-20 @ 05:08) (100 - 119)  BP: 137/72 (09-18-20 @ 05:08) (137/72 - 159/90)  RR: 19 (09-18-20 @ 05:08) (18 - 20)  SpO2: 96% (09-18-20 @ 05:08) (94% - 96%)      I&O's Summary  16 Sep 2020 07:01  -  17 Sep 2020 07:00  --------------------------------------------------------  IN: 450 mL / OUT: 1350 mL / NET: -900 mL    17 Sep 2020 07:01  -  18 Sep 2020 05:47  --------------------------------------------------------  IN: 1590 mL / OUT: 3100 mL / NET: -1510 mL        MEDICATIONS  (STANDING):  enoxaparin Injectable 40 milliGRAM(s) SubCutaneous daily  FLUoxetine 40 milliGRAM(s) Oral daily  lactated ringers. 1000 milliLiter(s) (75 mL/Hr) IV Continuous <Continuous>  ondansetron Injectable 4 milliGRAM(s) IV Push every 6 hours  ondansetron Injectable 4 milliGRAM(s) IV Push every 6 hours  pantoprazole    Tablet 40 milliGRAM(s) Oral before breakfast  risperiDONE   Tablet 1 milliGRAM(s) Oral daily  sodium chloride 0.9% lock flush 3 milliLiter(s) IV Push every 8 hours    MEDICATIONS  (PRN):  oxyCODONE    IR 5 milliGRAM(s) Oral every 3 hours PRN Moderate Pain (4 - 6)  oxyCODONE    IR 10 milliGRAM(s) Oral every 3 hours PRN Severe Pain (7 - 10)  petrolatum white Ointment 1 Application(s) Topical four times a day PRN irritation      Physical Exam:  Constitutional: NAD, A+O x3  CV: RRR  Lungs: Clear to auscultation bilaterally  Abdomen: Soft, nondistended, no guarding, no rebound, normal bowel sounds  Incision: Midline vertical incision and two port sites: Clean, dry, intact  Extremities: No lower extremity edema or calf tenderness bilaterally; Venodynes in place    LABS:  09-17    136    |  100    |  5<L>   ----------------------------<  127<H>  4.0     |  22     |  0.44<L>    Ca    8.1<L>      17 Sep 2020 06:00  Phos  2.1       09-17  Mg     2.0       09-17 Gyn ONC Progress Note POD #3    Patient seen and examined at bedside. No acute events overnight. She reports that her pain is well controlled. Patient ambulating through the halls without lightheadedness or dizziness. Has not yet passed flatus. Tolerating a clear liquid diet without nausea or emesis. She endorses a good mood, and denies suicidal thoughts or thoughts of harming herself or others.      Patient denies fever, chills, chest pain, SOB.       Objective:  T(F): 98.4 (09-18-20 @ 05:08), Max: 99.9 (09-17-20 @ 20:31)  HR: 100 (09-18-20 @ 05:08) (100 - 119)  BP: 137/72 (09-18-20 @ 05:08) (137/72 - 159/90)  RR: 19 (09-18-20 @ 05:08) (18 - 20)  SpO2: 96% (09-18-20 @ 05:08) (94% - 96%)      I&O's Summary  16 Sep 2020 07:01  -  17 Sep 2020 07:00  --------------------------------------------------------  IN: 450 mL / OUT: 1350 mL / NET: -900 mL    17 Sep 2020 07:01  -  18 Sep 2020 05:47  --------------------------------------------------------  IN: 1590 mL / OUT: 3100 mL / NET: -1510 mL        MEDICATIONS  (STANDING):  enoxaparin Injectable 40 milliGRAM(s) SubCutaneous daily  FLUoxetine 40 milliGRAM(s) Oral daily  lactated ringers. 1000 milliLiter(s) (75 mL/Hr) IV Continuous <Continuous>  ondansetron Injectable 4 milliGRAM(s) IV Push every 6 hours  ondansetron Injectable 4 milliGRAM(s) IV Push every 6 hours  pantoprazole    Tablet 40 milliGRAM(s) Oral before breakfast  risperiDONE   Tablet 1 milliGRAM(s) Oral daily  sodium chloride 0.9% lock flush 3 milliLiter(s) IV Push every 8 hours    MEDICATIONS  (PRN):  oxyCODONE    IR 5 milliGRAM(s) Oral every 3 hours PRN Moderate Pain (4 - 6)  oxyCODONE    IR 10 milliGRAM(s) Oral every 3 hours PRN Severe Pain (7 - 10)  petrolatum white Ointment 1 Application(s) Topical four times a day PRN irritation      Physical Exam:  Constitutional: NAD, A+O x3  CV: Tachycardic, regular rhythm  Lungs: Clear to auscultation bilaterally  Abdomen: Soft, nondistended, no guarding, no rebound, normal bowel sounds  Incision: Midline vertical incision and two port sites: Clean, dry, intact  Extremities: No lower extremity edema or calf tenderness bilaterally; Venodynes in place    LABS:  09-17    136    |  100    |  5<L>   ----------------------------<  127<H>  4.0     |  22     |  0.44<L>    Ca    8.1<L>      17 Sep 2020 06:00  Phos  2.1       09-17  Mg     2.0       09-17 Gyn ONC Progress Note POD #3    Patient seen and examined at bedside. No acute events overnight. She reports that her pain is well controlled. Patient ambulating through the halls without lightheadedness or dizziness. She passed flatus overnight. Tolerating a clear liquid diet without nausea or emesis. She endorses a good mood, and denies suicidal thoughts or thoughts of harming herself or others.      Patient denies fever, chills, chest pain, SOB.       Objective:  T(F): 98.4 (09-18-20 @ 05:08), Max: 99.9 (09-17-20 @ 20:31)  HR: 100 (09-18-20 @ 05:08) (100 - 119)  BP: 137/72 (09-18-20 @ 05:08) (137/72 - 159/90)  RR: 19 (09-18-20 @ 05:08) (18 - 20)  SpO2: 96% (09-18-20 @ 05:08) (94% - 96%)      I&O's Summary  16 Sep 2020 07:01  -  17 Sep 2020 07:00  --------------------------------------------------------  IN: 450 mL / OUT: 1350 mL / NET: -900 mL    17 Sep 2020 07:01  -  18 Sep 2020 05:47  --------------------------------------------------------  IN: 1590 mL / OUT: 3100 mL / NET: -1510 mL        MEDICATIONS  (STANDING):  enoxaparin Injectable 40 milliGRAM(s) SubCutaneous daily  FLUoxetine 40 milliGRAM(s) Oral daily  lactated ringers. 1000 milliLiter(s) (75 mL/Hr) IV Continuous <Continuous>  ondansetron Injectable 4 milliGRAM(s) IV Push every 6 hours  ondansetron Injectable 4 milliGRAM(s) IV Push every 6 hours  pantoprazole    Tablet 40 milliGRAM(s) Oral before breakfast  risperiDONE   Tablet 1 milliGRAM(s) Oral daily  sodium chloride 0.9% lock flush 3 milliLiter(s) IV Push every 8 hours    MEDICATIONS  (PRN):  oxyCODONE    IR 5 milliGRAM(s) Oral every 3 hours PRN Moderate Pain (4 - 6)  oxyCODONE    IR 10 milliGRAM(s) Oral every 3 hours PRN Severe Pain (7 - 10)  petrolatum white Ointment 1 Application(s) Topical four times a day PRN irritation      Physical Exam:  Constitutional: NAD, A+O x3  CV: Tachycardic, regular rhythm  Lungs: Clear to auscultation bilaterally  Abdomen: Soft, nondistended, no guarding, no rebound, normal bowel sounds  Incision: Midline vertical incision and two port sites: Clean, dry, intact  Extremities: No lower extremity edema or calf tenderness bilaterally; Venodynes in place    LABS:  09-17    136    |  100    |  5<L>   ----------------------------<  127<H>  4.0     |  22     |  0.44<L>    Ca    8.1<L>      17 Sep 2020 06:00  Phos  2.1       09-17  Mg     2.0       09-17

## 2020-09-19 ENCOUNTER — TRANSCRIPTION ENCOUNTER (OUTPATIENT)
Age: 45
End: 2020-09-19

## 2020-09-19 VITALS
HEART RATE: 96 BPM | DIASTOLIC BLOOD PRESSURE: 73 MMHG | OXYGEN SATURATION: 98 % | RESPIRATION RATE: 18 BRPM | TEMPERATURE: 99 F | SYSTOLIC BLOOD PRESSURE: 133 MMHG

## 2020-09-19 LAB
BASOPHILS # BLD AUTO: 0.03 K/UL — SIGNIFICANT CHANGE UP (ref 0–0.2)
BASOPHILS NFR BLD AUTO: 0.3 % — SIGNIFICANT CHANGE UP (ref 0–2)
EOSINOPHIL # BLD AUTO: 0.22 K/UL — SIGNIFICANT CHANGE UP (ref 0–0.5)
EOSINOPHIL NFR BLD AUTO: 2.5 % — SIGNIFICANT CHANGE UP (ref 0–6)
HCT VFR BLD CALC: 23.9 % — LOW (ref 34.5–45)
HGB BLD-MCNC: 7.3 G/DL — LOW (ref 11.5–15.5)
IMM GRANULOCYTES NFR BLD AUTO: 2.3 % — HIGH (ref 0–1.5)
LYMPHOCYTES # BLD AUTO: 1.17 K/UL — SIGNIFICANT CHANGE UP (ref 1–3.3)
LYMPHOCYTES # BLD AUTO: 13 % — SIGNIFICANT CHANGE UP (ref 13–44)
MCHC RBC-ENTMCNC: 28.6 PG — SIGNIFICANT CHANGE UP (ref 27–34)
MCHC RBC-ENTMCNC: 30.5 % — LOW (ref 32–36)
MCV RBC AUTO: 93.7 FL — SIGNIFICANT CHANGE UP (ref 80–100)
MONOCYTES # BLD AUTO: 0.72 K/UL — SIGNIFICANT CHANGE UP (ref 0–0.9)
MONOCYTES NFR BLD AUTO: 8 % — SIGNIFICANT CHANGE UP (ref 2–14)
NEUTROPHILS # BLD AUTO: 6.62 K/UL — SIGNIFICANT CHANGE UP (ref 1.8–7.4)
NEUTROPHILS NFR BLD AUTO: 73.9 % — SIGNIFICANT CHANGE UP (ref 43–77)
NRBC # FLD: 0 K/UL — SIGNIFICANT CHANGE UP (ref 0–0)
PLATELET # BLD AUTO: 475 K/UL — HIGH (ref 150–400)
PMV BLD: 10.1 FL — SIGNIFICANT CHANGE UP (ref 7–13)
RBC # BLD: 2.55 M/UL — LOW (ref 3.8–5.2)
RBC # FLD: 14.6 % — HIGH (ref 10.3–14.5)
WBC # BLD: 8.97 K/UL — SIGNIFICANT CHANGE UP (ref 3.8–10.5)
WBC # FLD AUTO: 8.97 K/UL — SIGNIFICANT CHANGE UP (ref 3.8–10.5)

## 2020-09-19 PROCEDURE — 99233 SBSQ HOSP IP/OBS HIGH 50: CPT

## 2020-09-19 RX ORDER — PETROLATUM,WHITE
1 JELLY (GRAM) TOPICAL
Qty: 0 | Refills: 0 | DISCHARGE
Start: 2020-09-19

## 2020-09-19 RX ORDER — ACETAMINOPHEN 500 MG
2 TABLET ORAL
Qty: 0 | Refills: 0 | DISCHARGE
Start: 2020-09-19

## 2020-09-19 RX ORDER — FLUOXETINE HCL 10 MG
1 CAPSULE ORAL
Qty: 0 | Refills: 0 | DISCHARGE
Start: 2020-09-19

## 2020-09-19 RX ORDER — OXYCODONE HYDROCHLORIDE 5 MG/1
1 TABLET ORAL
Qty: 10 | Refills: 0
Start: 2020-09-19

## 2020-09-19 RX ORDER — RISPERIDONE 4 MG/1
1 TABLET ORAL
Qty: 0 | Refills: 0 | DISCHARGE
Start: 2020-09-19

## 2020-09-19 RX ADMIN — RISPERIDONE 1 MILLIGRAM(S): 4 TABLET ORAL at 11:57

## 2020-09-19 RX ADMIN — Medication 650 MILLIGRAM(S): at 12:27

## 2020-09-19 RX ADMIN — PANTOPRAZOLE SODIUM 40 MILLIGRAM(S): 20 TABLET, DELAYED RELEASE ORAL at 04:59

## 2020-09-19 RX ADMIN — Medication 40 MILLIGRAM(S): at 11:56

## 2020-09-19 RX ADMIN — SODIUM CHLORIDE 3 MILLILITER(S): 9 INJECTION INTRAMUSCULAR; INTRAVENOUS; SUBCUTANEOUS at 05:27

## 2020-09-19 RX ADMIN — ENOXAPARIN SODIUM 40 MILLIGRAM(S): 100 INJECTION SUBCUTANEOUS at 11:56

## 2020-09-19 RX ADMIN — Medication 650 MILLIGRAM(S): at 11:57

## 2020-09-19 RX ADMIN — Medication 650 MILLIGRAM(S): at 06:11

## 2020-09-19 NOTE — CHART NOTE - NSCHARTNOTEFT_GEN_A_CORE
GYN ONC Chart Note    Spoke with brother (Jevon, HCP) regarding discharge.  Dc instructions and fu plan reviewed in detail.  All questions answered.    MD Mis

## 2020-09-19 NOTE — DISCHARGE NOTE NURSING/CASE MANAGEMENT/SOCIAL WORK - NSDCFUADDAPPT_GEN_ALL_CORE_FT
Please follow up with your psychiatrist Dr. Watkins. If you are unable to continue follow up with Dr. Watkins, please call [facility] to establish care.     [facility] - [phone]      Please call Dr. Segundo Turner, Surgeon, for follow up appointment in 2 weeks.

## 2020-09-19 NOTE — PROGRESS NOTE ADULT - PROBLEM SELECTOR PLAN 1
Neuro: Pain well controlled. Tylenol with Oxy PRN for pain.   Psych: PMHx of paranoid schizophrenia, anxiety and depression and suicide attempts. Patient on one-to-one observation due to passive suicidal ideation overnight POD #1-2, denying further SI. Evaluated by psychiatry POD#2, appreciate recommendation for Risperidone 0.5mg PO q8h PRN for agitation. Will continue home Risperdal and Prozac.     CV: Tachycardia, -116, EKG sinus tachycardia (9/17). Overnight, 90-low 100s. PMHx of pulmonic stenosis: TTE (9/10) showing normal pulmonic valve and mild to moderate TR. Appreciate Cardiology input. f/u AM CBC.   Pulm: Saturating well on RA. Increase incentive spirometry.  GI: Reg diet, tolerating well. SLIV. Zofran q6h for nausea. Continue Protonix 40mg PO qD.  - s/p Endoscopy/Colonoscopy (9/11): No infiltration or masses seen on colonoscopy; will f/u biopsy of esophagitis, gastric polyps, and 3mm colonic polyp.   : s/p Darnell catheter, voiding spontaneously, UOP adequate.  ID: No active issues. COVID negative (9/13)  Heme: Continue Lovenox and Venodynes for DVT ppx  Dispo: Continue inpatient care.     ADomney PGY-2  [id number] Neuro: Pain well controlled. Tylenol with Oxy PRN for pain.   Psych: PMHx of paranoid schizophrenia, anxiety and depression and suicide attempts. Patient on one-to-one observation due to passive suicidal ideation overnight POD #1-2, denying further SI. Evaluated by psychiatry POD#2, appreciate recommendation for Risperidone 0.5mg PO q8h PRN for agitation. Will continue home Risperdal and Prozac.  CV: Tachycardia, -116, EKG sinus tachycardia (9/17). Overnight (9/18), 90-low 100s. PMHx of pulmonic stenosis: TTE (9/10) showing normal pulmonic valve and mild to moderate TR. Appreciate Cardiology input. f/u AM CBC.   Pulm: Saturating well on RA. Increase incentive spirometry.  GI: Reg diet, tolerating well. SLIV. Zofran q6h for nausea. Continue Protonix 40mg PO qD.  - s/p Endoscopy/Colonoscopy (9/11): No infiltration or masses seen on colonoscopy; will f/u biopsy of esophagitis, gastric polyps, and 3mm colonic polyp.   : s/p Darnell catheter, voiding spontaneously, UOP adequate.  ID: No active issues. COVID negative (9/13)  Heme: Continue Lovenox and Venodynes for DVT ppx  Dispo: Continue inpatient care. Possible discharge today.    ADomney PGY-2  [id number] Neuro: Pain well controlled. Tylenol with Oxy PRN for pain.   Psych: PMHx of paranoid schizophrenia, anxiety and depression and suicide attempts. Patient on one-to-one observation due to passive suicidal ideation overnight POD #1-2, denying further SI. Evaluated by psychiatry POD#2, appreciate recommendation for Risperidone 0.5mg PO q8h PRN for agitation. Will continue home Risperdal and Prozac.  CV: Tachycardia, -116, EKG sinus tachycardia (9/17). Overnight (9/18), 90-low 100s. PMHx of pulmonic stenosis: TTE (9/10) showing normal pulmonic valve and mild to moderate TR. Appreciate Cardiology input. f/u AM CBC.   Pulm: Saturating well on RA. Increase incentive spirometry.  GI: Reg diet, tolerating well. SLIV. Zofran q6h for nausea. Continue Protonix 40mg PO qD.  - s/p Endoscopy/Colonoscopy (9/11): No infiltration or masses seen on colonoscopy; will f/u biopsy of esophagitis, gastric polyps, and 3mm colonic polyp.   : s/p Darnell catheter, voiding spontaneously, UOP adequate.  ID: No active issues. COVID negative (9/13)  Heme: Continue Lovenox and Venodynes for DVT ppx  Dispo: Continue inpatient care. Possible discharge today.    ADomney PGY-2  [id number]    Agree with above.  HR decreased, now wnl. Hgb 7.3 -> 7.3, stable.  No symptoms of anemia.  Pt desires d/c home; all d/c instructions reviewed.  Instructed to f/u with Dr. Horowitz and Dr. Turner.  MD Mis

## 2020-09-19 NOTE — PROGRESS NOTE ADULT - PROBLEM SELECTOR PLAN 3
Advanced care planning was discussed with patient and family.  Advanced care planning forms were reviewed and discussed.  Risks, benefits and alternatives of gastroenterologic procedures were discussed in detail and all questions were answered.
Advanced care planning was discussed with patient and family.  Advanced care planning forms were reviewed and discussed.  Risks, benefits and alternatives of gastroenterologic procedures were discussed in detail and all questions were answered.
As above
Advanced care planning was discussed with patient and family.  Advanced care planning forms were reviewed and discussed.  Risks, benefits and alternatives of gastroenterologic procedures were discussed in detail and all questions were answered.
Advanced care planning was discussed with patient and family.  Advanced care planning forms were reviewed and discussed.  Risks, benefits and alternatives of gastroenterologic procedures were discussed in detail and all questions were answered.
As above
Lovenox 40mg daily is appropriate for VTE ppx
Lovenox 40mg daily is appropriate for VTE ppx; can resume post-op if acceptable per Gyn/Onc.

## 2020-09-19 NOTE — DISCHARGE NOTE NURSING/CASE MANAGEMENT/SOCIAL WORK - PATIENT PORTAL LINK FT
You can access the FollowMyHealth Patient Portal offered by [facility] by registering at the following website: [email or web address]. By joining [facility]’s FollowMyHealth portal, you will also be able to view your health information using other applications (apps) compatible with our system.

## 2020-09-19 NOTE — PROGRESS NOTE ADULT - ATTENDING COMMENTS
Patient seen and examined  Denies nausea or vomiting  Tolerated bowel prep    Abd is soft, not tender, not distended    - for colonoscopy today  - OR planning for Tuesday, will be available
Patient seen and examined  Had some nausea overnight  Denies emesis  Denies flatus    Abd is soft, not distended, appropriately tender  Incisions are clean, dry and intact without erythema    - diet as tolerated  - appreciate continued care by gyn/oncology  - will continue to follow
Patient seen and examined  Appears comfortable sitting in chair  No nausea or vomiting  Tolerated some clears, but only wants water  Abd is soft, not distended, appropriately tender  Incision is clean, dry and intact without erythema    - diet as tolerated  - appreciate psych recommendations
Advanced care planning was discussed with patient and family.  Advanced care planning forms were reviewed and discussed as appropriate.  Differential diagnosis and plan of care discussed with patient after the evaluation.   Pain assessed and judicious use of narcotics when appropriate was discussed.  Importance of Fall prevention discussed.  Counseling on Smoking and Alcohol cessation was offered when appropriate.  Counseling on Diet, exercise, and medication compliance was done.
Patient seen and examined with housestaff and fellow.   Agree with assessment and plan as above.   For colonoscopy tomorrow.   Anticipating surgery next week.
patient is clinically improving, no nausea/vomiting - tolerating clears  abd soft/nt/nd  incision c/d/i  Plan  continue to advance diet  appreciate psych consult, continue 1:1  lovenox/SCDs
patient feeling well, no complaints, tolerating reg diet  feeling she has better mood  tolerating po pain meds  plan for likely d/c home tomorrow
Patient seen and examined on am rounds.   Doing well. Stable to discharge home today.
Patient seen and examined.   Preop for surgery tomorrow.   Antibiotic and mechanical bowel prep today.

## 2020-09-19 NOTE — PROGRESS NOTE ADULT - PROBLEM SELECTOR PROBLEM 2
Colon polyp
Colon polyp
H/O pulmonary valve stenosis
Colon polyp
H/O pulmonary valve stenosis
Paranoid schizophrenia
Abdominal pain
Abdominal pain
Colon polyp
Abdominal pain

## 2020-09-19 NOTE — PROGRESS NOTE ADULT - SUBJECTIVE AND OBJECTIVE BOX
Ms. Catalan is comfortable in bed  Denies nausea or vomiting  Tolerating PO intake  Abd pain controlled    Vital Signs Last 24 Hrs  T(C): 36.8 (19 Sep 2020 04:58), Max: 37.1 (18 Sep 2020 17:21)  T(F): 98.3 (19 Sep 2020 04:58), Max: 98.7 (18 Sep 2020 17:21)  HR: 101 (19 Sep 2020 04:58) (99 - 115)  BP: 135/71 (19 Sep 2020 04:58) (127/62 - 135/71)  BP(mean): --  RR: 18 (19 Sep 2020 04:58) (18 - 22)  SpO2: 100% (19 Sep 2020 04:58) (96% - 100%)    I&O's Summary    18 Sep 2020 07:01  -  19 Sep 2020 07:00  --------------------------------------------------------  IN: 480 mL / OUT: 2950 mL / NET: -2470 mL      On exaM: awake, alert and oriented  Breathing comfortably  Abd is soft, not distended, appropriately tender  Incision is clean, dry and intact without erythema

## 2020-09-19 NOTE — PROGRESS NOTE ADULT - ASSESSMENT
46 y/o now POD#3 s/p Exploratory Laparotomy, Total Abdominal Hysterectomy, Bilateral Salpingo-oophorectomy and Appendectomy on 9/15/2020, where she was found to have extensive endometriosis (frozen pathology=endometrioma). Patient with passive suicidal ideation overnight on POD#1-2, currently on one to one observation. Patient denies any active suicidal ideation at this time. She remains with stable vitals and reassuring physical exam, and is recovering well postoperatively. 46 y/o now POD#3 s/p Exploratory Laparotomy, Total Abdominal Hysterectomy, Bilateral Salpingo-oophorectomy and Appendectomy on 9/15/2020, where she was found to have extensive endometriosis (frozen pathology=endometrioma). Patient with passive suicidal ideation overnight on POD#1-2, s/p one to one observation. Patient denies any active suicidal ideation at this time, cleared by behavioral health team. She remains with stable vitals and reassuring physical exam, and is recovering well postoperatively.

## 2020-09-19 NOTE — PROGRESS NOTE ADULT - PROBLEM SELECTOR PROBLEM 3
ACP (advance care planning)
Abdominal pain
ACP (advance care planning)
ACP (advance care planning)
Abdominal pain
Need for prophylactic measure
ACP (advance care planning)

## 2020-09-19 NOTE — PROGRESS NOTE ADULT - SUBJECTIVE AND OBJECTIVE BOX
Gyn ONC Progress Note POD #4, HD#12  (INCOMPLETE NOTE)    Subjective:   Pt seen and examined at bedside. No events overnight. Pain well controlled. Patient ambulating. Passing flatus. Tolerating regular diet. Pt denies fever, chills, chest pain, SOB, nausea, vomiting, lightheadedness, dizziness.    She endorses a good mood, and denies suicidal thoughts or thoughts of harming herself or others.        Objective:  T(F): 98.3 (09-19-20 @ 04:58), Max: 98.7 (09-18-20 @ 17:21)  HR: 101 (09-19-20 @ 04:58) (99 - 115)  BP: 135/71 (09-19-20 @ 04:58) (127/62 - 135/71)  RR: 18 (09-19-20 @ 04:58) (18 - 22)  SpO2: 100% (09-19-20 @ 04:58) (96% - 100%)  Wt(kg): --  I&O's Summary    17 Sep 2020 07:01  -  18 Sep 2020 07:00  --------------------------------------------------------  IN: 1590 mL / OUT: 3900 mL / NET: -2310 mL    18 Sep 2020 07:01  -  19 Sep 2020 05:49  --------------------------------------------------------  IN: 240 mL / OUT: 2550 mL / NET: -2310 mL      MEDICATIONS  (STANDING):  acetaminophen   Tablet .. 650 milliGRAM(s) Oral every 6 hours  enoxaparin Injectable 40 milliGRAM(s) SubCutaneous daily  FLUoxetine 40 milliGRAM(s) Oral daily  ondansetron Injectable 4 milliGRAM(s) IV Push every 6 hours  pantoprazole    Tablet 40 milliGRAM(s) Oral before breakfast  risperiDONE   Tablet 1 milliGRAM(s) Oral daily  sodium chloride 0.9% lock flush 3 milliLiter(s) IV Push every 8 hours    MEDICATIONS  (PRN):  oxyCODONE    IR 5 milliGRAM(s) Oral every 3 hours PRN Moderate Pain (4 - 6)  oxyCODONE    IR 10 milliGRAM(s) Oral every 3 hours PRN Severe Pain (7 - 10)  petrolatum white Ointment 1 Application(s) Topical four times a day PRN irritation      Physical Exam:  Constitutional: NAD, A+O x3  CV: Tachycardic, regular rhythm  Lungs: Clear to auscultation bilaterally  Abdomen: Soft, nondistended, no guarding, no rebound, +BS  Incision: Midline vertical incision and port site x 2, c/d/i  Extremities: No lower extremity edema or calf tenderness bilaterally; SCDs in place      LABS:    09-18    138    |  101    |  3<L>   ----------------------------<  108<H>  3.7     |  24     |  0.43<L>    Ca    7.9<L>      18 Sep 2020 06:30  Phos  2.7       09-18  Mg     2.0       09-18             Gyn ONC Progress Note POD #4, HD#12     Subjective:   Pt seen and examined at bedside. No events overnight. Pain well controlled. Patient ambulating. Passing flatus. Tolerating regular diet. Pt denies fever, chills, chest pain, SOB, nausea, vomiting, lightheadedness, dizziness. States her mood is "fantastic" today. Denies suicidal thoughts or thoughts of harming herself or others.        Objective:  T(F): 98.3 (09-19-20 @ 04:58), Max: 98.7 (09-18-20 @ 17:21)  HR: 101 (09-19-20 @ 04:58) (99 - 115)  BP: 135/71 (09-19-20 @ 04:58) (127/62 - 135/71)  RR: 18 (09-19-20 @ 04:58) (18 - 22)  SpO2: 100% (09-19-20 @ 04:58) (96% - 100%)  Wt(kg): --  I&O's Summary    17 Sep 2020 07:01  -  18 Sep 2020 07:00  --------------------------------------------------------  IN: 1590 mL / OUT: 3900 mL / NET: -2310 mL    18 Sep 2020 07:01  -  19 Sep 2020 05:49  --------------------------------------------------------  IN: 240 mL / OUT: 2550 mL / NET: -2310 mL      MEDICATIONS  (STANDING):  acetaminophen   Tablet .. 650 milliGRAM(s) Oral every 6 hours  enoxaparin Injectable 40 milliGRAM(s) SubCutaneous daily  FLUoxetine 40 milliGRAM(s) Oral daily  ondansetron Injectable 4 milliGRAM(s) IV Push every 6 hours  pantoprazole    Tablet 40 milliGRAM(s) Oral before breakfast  risperiDONE   Tablet 1 milliGRAM(s) Oral daily  sodium chloride 0.9% lock flush 3 milliLiter(s) IV Push every 8 hours    MEDICATIONS  (PRN):  oxyCODONE    IR 5 milliGRAM(s) Oral every 3 hours PRN Moderate Pain (4 - 6)  oxyCODONE    IR 10 milliGRAM(s) Oral every 3 hours PRN Severe Pain (7 - 10)  petrolatum white Ointment 1 Application(s) Topical four times a day PRN irritation      Physical Exam:  Constitutional: NAD, A+O x3  CV: Tachycardic, regular rhythm  Lungs: Clear to auscultation bilaterally  Abdomen: Soft, nondistended, no guarding, no rebound, +BS  Incision: Midline vertical incision and port site x 2, c/d/i  Extremities: No lower extremity edema or calf tenderness bilaterally; SCDs in place      LABS:    09-18    138    |  101    |  3<L>   ----------------------------<  108<H>  3.7     |  24     |  0.43<L>    Ca    7.9<L>      18 Sep 2020 06:30  Phos  2.7       09-18  Mg     2.0       09-18

## 2020-09-19 NOTE — PROGRESS NOTE ADULT - ASSESSMENT
45 year old woman s/p ex-lap, TAHBSO, and appendectomy  - continue diet as tolerated  - out of bed and ambulate as tolerated  - can follow up as outpatient after discharge

## 2020-09-19 NOTE — PROGRESS NOTE ADULT - SUBJECTIVE AND OBJECTIVE BOX
Patient is a 45y old  Female who presents with a chief complaint of concern for ovarian malignancy (19 Sep 2020 08:07)      SUBJECTIVE / OVERNIGHT EVENTS: No overnight event. Pt calm this morning.    MEDICATIONS  (STANDING):  acetaminophen   Tablet .. 650 milliGRAM(s) Oral every 6 hours  enoxaparin Injectable 40 milliGRAM(s) SubCutaneous daily  FLUoxetine 40 milliGRAM(s) Oral daily  ondansetron Injectable 4 milliGRAM(s) IV Push every 6 hours  pantoprazole    Tablet 40 milliGRAM(s) Oral before breakfast  risperiDONE   Tablet 1 milliGRAM(s) Oral daily  sodium chloride 0.9% lock flush 3 milliLiter(s) IV Push every 8 hours    MEDICATIONS  (PRN):  oxyCODONE    IR 5 milliGRAM(s) Oral every 3 hours PRN Moderate Pain (4 - 6)  oxyCODONE    IR 10 milliGRAM(s) Oral every 3 hours PRN Severe Pain (7 - 10)  petrolatum white Ointment 1 Application(s) Topical four times a day PRN irritation      CAPILLARY BLOOD GLUCOSE        I&O's Summary    18 Sep 2020 07:01  -  19 Sep 2020 07:00  --------------------------------------------------------  IN: 480 mL / OUT: 2950 mL / NET: -2470 mL    19 Sep 2020 07:01  -  19 Sep 2020 12:45  --------------------------------------------------------  IN: 0 mL / OUT: 800 mL / NET: -800 mL        PHYSICAL EXAM:  Vital Signs Last 24 Hrs  T(C): 37 (19 Sep 2020 12:01), Max: 37.1 (18 Sep 2020 17:21)  T(F): 98.6 (19 Sep 2020 12:01), Max: 98.7 (18 Sep 2020 17:21)  HR: 96 (19 Sep 2020 12:01) (92 - 109)  BP: 133/73 (19 Sep 2020 12:01) (127/62 - 135/71)  BP(mean): --  RR: 18 (19 Sep 2020 12:01) (18 - 20)  SpO2: 98% (19 Sep 2020 12:01) (97% - 100%)  CONSTITUTIONAL: NAD, well-developed, well-groomed  EYES: PERRLA; conjunctiva and sclera clear  ENMT: Moist oral mucosa, no pharyngeal injection or exudates; normal dentition  NECK: Supple, no palpable masses; no thyromegaly  RESPIRATORY: Normal respiratory effort; lungs are clear to auscultation bilaterally  CARDIOVASCULAR: Regular rate and rhythm, normal S1 and S2, no murmur/rub/gallop; No lower extremity edema; Peripheral pulses are 2+ bilaterally  ABDOMEN: Nontender to palpation, normoactive bowel sounds, no rebound/guarding; No hepatosplenomegaly      LABS:                        7.3    8.97  )-----------( 475      ( 19 Sep 2020 06:04 )             23.9     09-18    138  |  101  |  3<L>  ----------------------------<  108<H>  3.7   |  24  |  0.43<L>    Ca    7.9<L>      18 Sep 2020 06:30  Phos  2.7     09-18  Mg     2.0     09-18                  RADIOLOGY & ADDITIONAL TESTS:  Results Reviewed:   Imaging Personally Reviewed:  Electrocardiogram Personally Reviewed:    COORDINATION OF CARE:  Care Discussed with Consultants/Other Providers [Y/N]:  Prior or Outpatient Records Reviewed [Y/N]:

## 2020-09-19 NOTE — PROGRESS NOTE ADULT - PROBLEM SELECTOR PROBLEM 1
Pelvic mass
Ovarian malignancy
Ovarian malignancy
Adnexal mass
Ovarian malignancy
Pelvic mass
Postoperative state
Adnexal mass
Pelvic mass
Postoperative state
Pelvic mass
Pelvic mass

## 2020-09-19 NOTE — PROGRESS NOTE ADULT - PROBLEM SELECTOR PLAN 2
noted on colonoscopy; fu pathology   Repeat colonoscopy in 5 years or sooner pending path
Normal pulmonic valve on TTE   Mild moderate TR.
noted on colonoscopy  fu pathology   Repeat colonoscopy in 5 years or sooner pending path
Calm without behavioral disturbance. No complaint of anxiety or depressed mood. Continue risperdal and prozac.
Currently calm without behavioral disturbance. No complaint of anxiety or depressed mood. Brief reported SI resolved with reassurance from nursing, and Psychiatry has assessed pt. Continue risperdal and prozac.
Likely low grade   Repeat colonoscopy in 5 years or sooner pending path
Likely low grade   Repeat colonoscopy in 5 years or sooner pending path
Normal pulmonic valve on TTE   Mild moderate TR.
noted on colonoscopy; fu pathology   Repeat colonoscopy in 5 years or sooner pending path
Fellow Note    Patient seen and examined. Agree with above. Tolerated prep without nausea/emesis.     VS reviewed  Labs reviewed    Plan for colonoscopy with GI  Will advance diet after colonoscopy  VTE ppx  OOB  Continue risperidone  Psych and Holistic consult today  Appreciate Cards recs  OR planning for Tuesday 9/15    Jie RIZZO
Fellow Note    Patient seen and examined. Complains of pain and nausea this AM.    VS reviewed  Labs reviewed    IV analgesia and antiemetics to optimize pain control and nausea  CLD while nauseous, advance today  kenn roberts  Continue home meds  OOB  VTE ppx    Jie RIZZO
noted on colonoscopy; fu pathology   Repeat colonoscopy in 5 years or sooner pending path
Fellow Note    Patient seen and examined. Agree with above.    VS reviewed  Labs pending    Plan for OR tomorrow 9/15  Bowel prep and CLD starting at noon  VTE ppx  OOB  Continue home medications  Appreciate Psych, GI, Cards, Surg lynette Ceron MD

## 2020-09-19 NOTE — PROGRESS NOTE ADULT - REASON FOR ADMISSION
concern for ovarian malignancy

## 2020-09-19 NOTE — PROGRESS NOTE ADULT - SUBJECTIVE AND OBJECTIVE BOX
INTERVAL HPI/OVERNIGHT EVENTS:  No new overnight event.  No N/V/D.  Tolerating diet.    Allergies    ibuprofen (Other)  NSAIDs (Unknown)    Intolerances    General:  No wt loss, fevers, chills, night sweats, fatigue,   Eyes:  Good vision, no reported pain  ENT:  No sore throat, pain, runny nose, dysphagia  CV:  No pain, palpitations, hypo/hypertension  Resp:  No dyspnea, cough, tachypnea, wheezing  GI:  No pain, No nausea, No vomiting, No diarrhea, No constipation, No weight loss, No fever, No pruritis, No rectal bleeding, No tarry stools, No dysphagia,  :  No pain, bleeding, incontinence, nocturia  Muscle:  No pain, weakness  Neuro:  No weakness, tingling, memory problems  Psych:  No fatigue, insomnia, mood problems, depression  Endocrine:  No polyuria, polydipsia, cold/heat intolerance  Heme:  No petechiae, ecchymosis, easy bruisability  Skin:  No rash, tattoos, scars, edema      PHYSICAL EXAM:   Vital Signs:  Vital Signs Last 24 Hrs  T(C): 37 (19 Sep 2020 12:01), Max: 37.1 (18 Sep 2020 17:21)  T(F): 98.6 (19 Sep 2020 12:01), Max: 98.7 (18 Sep 2020 17:21)  HR: 96 (19 Sep 2020 12:01) (92 - 109)  BP: 133/73 (19 Sep 2020 12:01) (127/62 - 135/71)  BP(mean): --  RR: 18 (19 Sep 2020 12:01) (18 - 20)  SpO2: 98% (19 Sep 2020 12:01) (97% - 100%)  Daily     Daily I&O's Summary    18 Sep 2020 07:01  -  19 Sep 2020 07:00  --------------------------------------------------------  IN: 480 mL / OUT: 2950 mL / NET: -2470 mL    19 Sep 2020 07:01  -  19 Sep 2020 13:03  --------------------------------------------------------  IN: 0 mL / OUT: 800 mL / NET: -800 mL        GENERAL:  Appears stated age, well-groomed, well-nourished, no distress  HEENT:  NC/AT,  conjunctivae clear and pink, no thyromegaly, nodules, adenopathy, no JVD, sclera -anicteric  CHEST:  Full & symmetric excursion, no increased effort, breath sounds clear  HEART:  Regular rhythm, S1, S2, no murmur/rub/S3/S4, no abdominal bruit, no edema  ABDOMEN:  Soft, non-tender, non-distended, normoactive bowel sounds,  no masses ,no hepato-splenomegaly, no signs of chronic liver disease  EXTEREMITIES:  no cyanosis,clubbing or edema  SKIN:  No rash/erythema/ecchymoses/petechiae/wounds/abscess/warm/dry  NEURO:  Alert, oriented, no asterixis, no tremor, no encephalopathy      LABS:                        7.3    8.97  )-----------( 475      ( 19 Sep 2020 06:04 )             23.9     09-18    138  |  101  |  3<L>  ----------------------------<  108<H>  3.7   |  24  |  0.43<L>    Ca    7.9<L>      18 Sep 2020 06:30  Phos  2.7     09-18  Mg     2.0     09-18          amylase   lipase  RADIOLOGY & ADDITIONAL TESTS:

## 2020-09-19 NOTE — PROGRESS NOTE ADULT - PROVIDER SPECIALTY LIST ADULT
Anesthesia
Cardiology
Gastroenterology
Gyn Onc
Hospitalist
Pain Medicine
Surgery
Gyn Onc
Gastroenterology
Cardiology
Gastroenterology
Gastroenterology

## 2020-10-05 LAB — SURGICAL PATHOLOGY STUDY: SIGNIFICANT CHANGE UP

## 2020-10-07 ENCOUNTER — APPOINTMENT (OUTPATIENT)
Dept: GYNECOLOGIC ONCOLOGY | Facility: CLINIC | Age: 45
End: 2020-10-07
Payer: MEDICARE

## 2020-10-07 VITALS
TEMPERATURE: 97.8 F | DIASTOLIC BLOOD PRESSURE: 74 MMHG | WEIGHT: 176.31 LBS | HEART RATE: 82 BPM | HEIGHT: 67 IN | SYSTOLIC BLOOD PRESSURE: 119 MMHG | BODY MASS INDEX: 27.67 KG/M2

## 2020-10-07 PROCEDURE — 99024 POSTOP FOLLOW-UP VISIT: CPT

## 2020-10-19 NOTE — CONSULT NOTE ADULT - PROBLEM/RECOMMENDATION-2
Is This A New Presentation, Or A Follow-Up?: Discoloration
How Severe Is It?: moderate
DISPLAY PLAN FREE TEXT

## 2020-11-18 ENCOUNTER — APPOINTMENT (OUTPATIENT)
Dept: GYNECOLOGIC ONCOLOGY | Facility: CLINIC | Age: 45
End: 2020-11-18
Payer: MEDICARE

## 2020-11-18 VITALS
HEIGHT: 67 IN | WEIGHT: 176 LBS | SYSTOLIC BLOOD PRESSURE: 147 MMHG | HEART RATE: 81 BPM | TEMPERATURE: 98 F | BODY MASS INDEX: 27.62 KG/M2 | DIASTOLIC BLOOD PRESSURE: 89 MMHG

## 2020-11-18 DIAGNOSIS — Z48.89 ENCOUNTER FOR OTHER SPECIFIED SURGICAL AFTERCARE: ICD-10-CM

## 2020-11-18 DIAGNOSIS — N80.3 ENDOMETRIOSIS OF PELVIC PERITONEUM: ICD-10-CM

## 2020-11-18 DIAGNOSIS — D37.3 NEOPLASM OF UNCERTAIN BEHAVIOR OF APPENDIX: ICD-10-CM

## 2020-11-18 DIAGNOSIS — N94.89 OTHER SPECIFIED CONDITIONS ASSOCIATED WITH FEMALE GENITAL ORGANS AND MENSTRUAL CYCLE: ICD-10-CM

## 2020-11-18 PROCEDURE — 99024 POSTOP FOLLOW-UP VISIT: CPT

## 2020-11-18 RX ORDER — RISPERIDONE 0.5 MG/1
TABLET ORAL
Refills: 0 | Status: ACTIVE | COMMUNITY

## 2020-11-18 RX ORDER — CHROMIUM 200 MCG
TABLET ORAL
Refills: 0 | Status: ACTIVE | COMMUNITY

## 2020-11-18 RX ORDER — FLUOXETINE HYDROCHLORIDE 40 MG/1
CAPSULE ORAL
Refills: 0 | Status: ACTIVE | COMMUNITY

## 2020-12-10 NOTE — PATIENT PROFILE ADULT - BRADEN ACTIVITY
Addended by: Indio Cardozo on: 12/10/2020 10:46 AM     Modules accepted: Orders
(3) walks occasionally

## 2022-12-08 NOTE — CONSULT NOTE ADULT - CONSULT REQUESTED DATE/TIME
10-Sep-2020 12:08 Doxepin Counseling:  Patient advised that the medication is sedating and not to drive a car after taking this medication. Patient informed of potential adverse effects including but not limited to dry mouth, urinary retention, and blurry vision.  The patient verbalized understanding of the proper use and possible adverse effects of doxepin.  All of the patient's questions and concerns were addressed.

## 2024-02-21 NOTE — ED ADULT NURSE NOTE - NSFALLRSKINDICATORS_ED_ALL_ED
RETURN TO THE EMERGENCY ROOM FOR THE FOLLOWING:    Severely worsened breathing, fainting, repeated falling, fainting, or at anytime for any concern.    FOLLOW UP:    Primary care referral order placed at the time of discharge, expect a phone call within the next few days to help with scheduling.    TREATMENT RECOMMENDATIONS:    Metoprolol 25 mg twice a day.  If your pulse is consistently greater than 110 bpm then increase your dose of metoprolol to 50 mg twice a day.    NURSE ADVICE LINE:  [phone] or [phone]     no